# Patient Record
Sex: FEMALE | Race: OTHER | HISPANIC OR LATINO | Employment: UNEMPLOYED | URBAN - METROPOLITAN AREA
[De-identification: names, ages, dates, MRNs, and addresses within clinical notes are randomized per-mention and may not be internally consistent; named-entity substitution may affect disease eponyms.]

---

## 2018-03-15 ENCOUNTER — OFFICE VISIT (OUTPATIENT)
Dept: URGENT CARE | Facility: CLINIC | Age: 48
End: 2018-03-15
Payer: COMMERCIAL

## 2018-03-15 VITALS
DIASTOLIC BLOOD PRESSURE: 93 MMHG | BODY MASS INDEX: 43.43 KG/M2 | OXYGEN SATURATION: 99 % | TEMPERATURE: 100 F | RESPIRATION RATE: 18 BRPM | WEIGHT: 236 LBS | HEIGHT: 62 IN | HEART RATE: 86 BPM | SYSTOLIC BLOOD PRESSURE: 187 MMHG

## 2018-03-15 DIAGNOSIS — J01.90 ACUTE SINUSITIS, RECURRENCE NOT SPECIFIED, UNSPECIFIED LOCATION: Primary | ICD-10-CM

## 2018-03-15 PROCEDURE — 99213 OFFICE O/P EST LOW 20 MIN: CPT | Performed by: PHYSICIAN ASSISTANT

## 2018-03-15 RX ORDER — PRAMIPEXOLE DIHYDROCHLORIDE 1 MG/1
0.25 TABLET ORAL 3 TIMES DAILY
COMMUNITY

## 2018-03-15 RX ORDER — AMOXICILLIN AND CLAVULANATE POTASSIUM 875; 125 MG/1; MG/1
1 TABLET, FILM COATED ORAL EVERY 12 HOURS SCHEDULED
Qty: 20 TABLET | Refills: 0 | Status: SHIPPED | OUTPATIENT
Start: 2018-03-15 | End: 2018-03-25

## 2018-03-15 RX ORDER — LEFLUNOMIDE 20 MG/1
20 TABLET ORAL DAILY
COMMUNITY
End: 2018-09-20

## 2018-03-15 RX ORDER — CLONIDINE HYDROCHLORIDE 0.2 MG/1
0.2 TABLET ORAL
COMMUNITY

## 2018-03-15 RX ORDER — SUMATRIPTAN 100 MG/1
100 TABLET, FILM COATED ORAL ONCE AS NEEDED
COMMUNITY

## 2018-03-15 RX ORDER — MONTELUKAST SODIUM 10 MG/1
10 TABLET ORAL
COMMUNITY

## 2018-03-15 RX ORDER — AMLODIPINE BESYLATE 10 MG/1
10 TABLET ORAL DAILY
COMMUNITY

## 2018-03-15 RX ORDER — HYDROXYZINE HYDROCHLORIDE 25 MG/1
25 TABLET, FILM COATED ORAL EVERY 6 HOURS PRN
COMMUNITY

## 2018-03-15 NOTE — PROGRESS NOTES
[facility]        NAME: Nellie Joe is a 52 y o  female  : 1970    MRN: [id number]  DATE: March 15, 2018  TIME: 4:29 PM    Assessment and Plan   Acute sinusitis, recurrence not specified, unspecified location [J01 90]  1  Acute sinusitis, recurrence not specified, unspecified location  amoxicillin-clavulanate (AUGMENTIN) 875-125 mg per tablet     Patient Instructions     Take antibiotic as prescribed  otc medicines as needed for symptom control  Follow up with PCP in 3-5 days  Proceed to  ER if symptoms worsen  Chief Complaint     Chief Complaint   Patient presents with    Earache     left ear pain frontal headache x 1 week     History of Present Illness       Earache    There is pain in the left ear  This is a new problem  The current episode started today  The problem occurs constantly  The problem has been unchanged  Associated symptoms include coughing and rhinorrhea  Pertinent negatives include no abdominal pain, diarrhea, ear discharge, headaches, hearing loss, neck pain, rash, sore throat or vomiting  She has tried nothing for the symptoms  There is no history of a chronic ear infection, hearing loss or a tympanostomy tube  Review of Systems   Review of Systems   Constitutional: Negative for activity change, appetite change, chills, diaphoresis, fatigue, fever and unexpected weight change  HENT: Positive for ear pain, postnasal drip, rhinorrhea, sinus pain and sinus pressure  Negative for ear discharge, hearing loss and sore throat  Respiratory: Positive for cough  Negative for apnea, choking, chest tightness, shortness of breath, wheezing and stridor  Cardiovascular: Negative for chest pain, palpitations and leg swelling  Gastrointestinal: Negative for abdominal pain, diarrhea and vomiting  Musculoskeletal: Negative for neck pain  Skin: Negative for rash  Neurological: Negative for headaches           Current Medications       Current Outpatient Prescriptions:     amLODIPine (NORVASC) 10 mg tablet, Take 10 mg by mouth daily, Disp: , Rfl:     cloNIDine (CATAPRES) 0 2 mg tablet, Take 0 2 mg by mouth daily at bedtime, Disp: , Rfl:     hydrOXYzine HCL (ATARAX) 25 mg tablet, Take 25 mg by mouth every 6 (six) hours as needed for itching, Disp: , Rfl:     leflunomide (ARAVA) 20 MG tablet, Take 20 mg by mouth daily, Disp: , Rfl:     montelukast (SINGULAIR) 10 mg tablet, Take 10 mg by mouth daily at bedtime, Disp: , Rfl:     pramipexole (MIRAPEX) 1 mg tablet, Take 0 25 mg by mouth 3 (three) times a day, Disp: , Rfl:     SUMAtriptan (IMITREX) 100 mg tablet, Take 100 mg by mouth once as needed for migraine, Disp: , Rfl:     amoxicillin-clavulanate (AUGMENTIN) 875-125 mg per tablet, Take 1 tablet by mouth every 12 (twelve) hours for 10 days, Disp: 20 tablet, Rfl: 0    Current Allergies     Allergies as of 03/15/2018 - never reviewed   Allergen Reaction Noted    Ibuprofen  03/15/2018    Latex  03/15/2018    Lisinopril  03/15/2018    Red dye  03/15/2018            The following portions of the patient's history were reviewed and updated as appropriate: allergies, current medications, past family history, past medical history, past social history, past surgical history and problem list      Past Medical History:   Diagnosis Date    Angioedema     Hypertension     Migraine     Restless leg        History reviewed  No pertinent surgical history  No family history on file  Medications have been verified  Objective   There were no vitals taken for this visit  Physical Exam     Physical Exam   Constitutional: She appears well-developed and well-nourished  HENT:   Head: Normocephalic  Right Ear: Hearing, tympanic membrane, external ear and ear canal normal    Left Ear: Hearing, external ear and ear canal normal  Tympanic membrane is retracted  Nose: Mucosal edema and rhinorrhea (purulent) present   Right sinus exhibits frontal sinus tenderness  Left sinus exhibits frontal sinus tenderness  Mouth/Throat: Posterior oropharyngeal erythema present  No oropharyngeal exudate or posterior oropharyngeal edema  Cardiovascular: Normal rate, regular rhythm, normal heart sounds and intact distal pulses  Exam reveals no gallop and no friction rub  No murmur heard  Pulmonary/Chest: Effort normal and breath sounds normal  No respiratory distress  She has no wheezes  She has no rales  She exhibits no tenderness  Abdominal: Soft  Bowel sounds are normal  She exhibits no distension and no mass  There is no tenderness  There is no rebound and no guarding

## 2018-07-08 ENCOUNTER — OFFICE VISIT (OUTPATIENT)
Dept: URGENT CARE | Facility: CLINIC | Age: 48
End: 2018-07-08
Payer: COMMERCIAL

## 2018-07-08 VITALS
BODY MASS INDEX: 43.43 KG/M2 | RESPIRATION RATE: 18 BRPM | HEART RATE: 75 BPM | HEIGHT: 62 IN | DIASTOLIC BLOOD PRESSURE: 77 MMHG | SYSTOLIC BLOOD PRESSURE: 142 MMHG | TEMPERATURE: 97.6 F | WEIGHT: 236 LBS | OXYGEN SATURATION: 97 %

## 2018-07-08 DIAGNOSIS — R10.11 RIGHT UPPER QUADRANT ABDOMINAL PAIN: Primary | ICD-10-CM

## 2018-07-08 PROCEDURE — 99213 OFFICE O/P EST LOW 20 MIN: CPT | Performed by: EMERGENCY MEDICINE

## 2018-07-08 NOTE — PROGRESS NOTES
[address] [facility]        NAME: Marybeth Bautista is a 52 y o  female  : 1970    MRN: [id number]  DATE: 2018  TIME: 11:35 AM    Assessment and Plan   Right upper quadrant abdominal pain [R10 11]  1  Right upper quadrant abdominal pain  Ambulatory Referral to Emergency Medicine         Patient Instructions     Patient Instructions     Gallstones   WHAT YOU NEED TO KNOW:   Gallstones, also called cholelithiasis, are hard substances that form in your gallbladder or bile duct  Your gallbladder and bile duct are located on the right side of your abdomen, near your liver  Your gallbladder stores bile, which helps break down the fat that you eat  Your gallbladder also helps remove certain chemicals from your body  DISCHARGE INSTRUCTIONS:   Medicines:   · Prescription pain medicine  may be given  Ask your healthcare provider how to take this medicine safely  · Take your medicine as directed  Contact your healthcare provider if you think your medicine is not helping or if you have side effects  Tell him if you are allergic to any medicine  Keep a list of the medicines, vitamins, and herbs you take  Include the amounts, and when and why you take them  Bring the list or the pill bottles to follow-up visits  Carry your medicine list with you in case of an emergency  Follow up with your healthcare provider as directed:  Write down your questions so you remember to ask them during your visits  Eat a variety of healthy foods: This may help you have more energy and heal faster  Healthy foods include fruit, vegetables, whole-grain breads, low-fat dairy products, beans, lean meat, and fish  Ask if you need to be on a special diet  Exercise as directed:  Talk to your healthcare provider about the best exercise plan for you  Exercise can help you lose weight and improve your health  Contact your healthcare provider if:   · You have nausea and are vomiting  · Your urine is dark      · You have gilles-colored bowel movements  · You have questions or concerns about your condition or care  Seek care immediately or call 911 if:   · You have a fever and chills  · Your skin or eyes turn yellow  · You have severe pain in your upper abdomen, just below the right ribcage  © 2017 [employer] Information is for End User's use only and may not be sold, redistributed or otherwise used for commercial purposes  All illustrations and images included in CareNotes® are the copyrighted property of [employer]  or [employer]  The above information is an  only  It is not intended as medical advice for individual conditions or treatments  Talk to your doctor, nurse or pharmacist before following any medical regimen to see if it is safe and effective for you  Follow up with PCP in 3-5 days  Proceed to  ER if symptoms worsen  Chief Complaint     Chief Complaint   Patient presents with    Abdominal Pain     right upper abdominal pain described as stabbing since yesterday worse today         History of Present Illness       Patient complains of sharp right upper quadrant pain with radiation to back onset yesterday after fatty foods  She denies fever chills  Admits similar but less severe symptoms in the past         Review of Systems   Review of Systems   Constitutional: Negative for chills and fever  HENT: Negative for sinus pressure, sore throat, trouble swallowing and voice change  Respiratory: Negative for cough, chest tightness, shortness of breath and wheezing  Cardiovascular: Negative for chest pain  Gastrointestinal: Positive for abdominal pain and nausea  Negative for abdominal distention, blood in stool, diarrhea and vomiting  Skin: Negative for rash  Neurological: Negative for headaches           Current Medications       Current Outpatient Prescriptions:     amLODIPine (NORVASC) 10 mg tablet, Take 10 mg by mouth daily, Disp: , Rfl:    cloNIDine (CATAPRES) 0 2 mg tablet, Take 0 2 mg by mouth daily at bedtime, Disp: , Rfl:     hydrOXYzine HCL (ATARAX) 25 mg tablet, Take 25 mg by mouth every 6 (six) hours as needed for itching, Disp: , Rfl:     leflunomide (ARAVA) 20 MG tablet, Take 20 mg by mouth daily, Disp: , Rfl:     montelukast (SINGULAIR) 10 mg tablet, Take 10 mg by mouth daily at bedtime, Disp: , Rfl:     pramipexole (MIRAPEX) 1 mg tablet, Take 0 25 mg by mouth 3 (three) times a day, Disp: , Rfl:     SUMAtriptan (IMITREX) 100 mg tablet, Take 100 mg by mouth once as needed for migraine, Disp: , Rfl:     Current Allergies     Allergies as of 07/08/2018 - Reviewed 07/08/2018   Allergen Reaction Noted    Ibuprofen  03/15/2018    Latex  03/15/2018    Lisinopril  03/15/2018    Red dye  03/15/2018            The following portions of the patient's history were reviewed and updated as appropriate: allergies, current medications, past family history, past medical history, past social history, past surgical history and problem list      Past Medical History:   Diagnosis Date    Angioedema     Hypertension     Migraine     Restless leg        History reviewed  No pertinent surgical history  No family history on file  Medications have been verified  Objective   /77   Pulse 75   Temp 97 6 °F (36 4 °C) (Tympanic)   Resp 18   Ht 5' 2" (1 575 m)   Wt 107 kg (236 lb)   SpO2 97%   BMI 43 16 kg/m²        Physical Exam     Physical Exam   Constitutional: She is oriented to person, place, and time  She appears well-developed and well-nourished  No distress  HENT:   Head: Normocephalic and atraumatic  Right Ear: Tympanic membrane and external ear normal    Left Ear: Tympanic membrane and external ear normal    Nose: Mucosal edema present  Mouth/Throat: Posterior oropharyngeal erythema present  No oropharyngeal exudate or tonsillar abscesses  Neck: Neck supple  Cardiovascular: Normal rate and regular rhythm  Pulmonary/Chest: Effort normal    Abdominal: Soft  Bowel sounds are normal  She exhibits no distension and no mass  There is tenderness  There is no rebound and no guarding  Tender right upper quadrant no rebound or guarding  Neurological: She is alert and oriented to person, place, and time  Skin: Skin is warm and dry  No rash noted  No pallor  Nursing note and vitals reviewed

## 2018-07-08 NOTE — PATIENT INSTRUCTIONS
Gallstones   WHAT YOU NEED TO KNOW:   Gallstones, also called cholelithiasis, are hard substances that form in your gallbladder or bile duct  Your gallbladder and bile duct are located on the right side of your abdomen, near your liver  Your gallbladder stores bile, which helps break down the fat that you eat  Your gallbladder also helps remove certain chemicals from your body  DISCHARGE INSTRUCTIONS:   Medicines:   · Prescription pain medicine  may be given  Ask your healthcare provider how to take this medicine safely  · Take your medicine as directed  Contact your healthcare provider if you think your medicine is not helping or if you have side effects  Tell him if you are allergic to any medicine  Keep a list of the medicines, vitamins, and herbs you take  Include the amounts, and when and why you take them  Bring the list or the pill bottles to follow-up visits  Carry your medicine list with you in case of an emergency  Follow up with your healthcare provider as directed:  Write down your questions so you remember to ask them during your visits  Eat a variety of healthy foods: This may help you have more energy and heal faster  Healthy foods include fruit, vegetables, whole-grain breads, low-fat dairy products, beans, lean meat, and fish  Ask if you need to be on a special diet  Exercise as directed:  Talk to your healthcare provider about the best exercise plan for you  Exercise can help you lose weight and improve your health  Contact your healthcare provider if:   · You have nausea and are vomiting  · Your urine is dark  · You have gilles-colored bowel movements  · You have questions or concerns about your condition or care  Seek care immediately or call 911 if:   · You have a fever and chills  · Your skin or eyes turn yellow  · You have severe pain in your upper abdomen, just below the right ribcage    © 2017 [employer] Information is for End User's use only and may not be sold, redistributed or otherwise used for commercial purposes  All illustrations and images included in CareNotes® are the copyrighted property of [employer]  or [employer]  The above information is an  only  It is not intended as medical advice for individual conditions or treatments  Talk to your doctor, nurse or pharmacist before following any medical regimen to see if it is safe and effective for you

## 2018-07-14 ENCOUNTER — OFFICE VISIT (OUTPATIENT)
Dept: URGENT CARE | Facility: CLINIC | Age: 48
End: 2018-07-14
Payer: COMMERCIAL

## 2018-07-14 ENCOUNTER — APPOINTMENT (OUTPATIENT)
Dept: RADIOLOGY | Facility: CLINIC | Age: 48
End: 2018-07-14
Payer: COMMERCIAL

## 2018-07-14 VITALS
SYSTOLIC BLOOD PRESSURE: 186 MMHG | RESPIRATION RATE: 20 BRPM | OXYGEN SATURATION: 97 % | DIASTOLIC BLOOD PRESSURE: 85 MMHG | HEART RATE: 77 BPM | HEIGHT: 62 IN | BODY MASS INDEX: 42.33 KG/M2 | WEIGHT: 230 LBS | TEMPERATURE: 99.1 F

## 2018-07-14 DIAGNOSIS — M25.562 LEFT KNEE PAIN, UNSPECIFIED CHRONICITY: ICD-10-CM

## 2018-07-14 DIAGNOSIS — M25.562 LEFT KNEE PAIN, UNSPECIFIED CHRONICITY: Primary | ICD-10-CM

## 2018-07-14 PROCEDURE — 73562 X-RAY EXAM OF KNEE 3: CPT

## 2018-07-14 PROCEDURE — 99213 OFFICE O/P EST LOW 20 MIN: CPT | Performed by: EMERGENCY MEDICINE

## 2018-07-14 PROCEDURE — 20610 DRAIN/INJ JOINT/BURSA W/O US: CPT | Performed by: EMERGENCY MEDICINE

## 2018-07-14 RX ORDER — METHYLPREDNISOLONE ACETATE 40 MG/ML
1 INJECTION, SUSPENSION INTRA-ARTICULAR; INTRALESIONAL; INTRAMUSCULAR; SOFT TISSUE
Status: COMPLETED | OUTPATIENT
Start: 2018-07-14 | End: 2018-07-14

## 2018-07-14 RX ORDER — LIDOCAINE HYDROCHLORIDE 10 MG/ML
2 INJECTION, SOLUTION INFILTRATION; PERINEURAL
Status: COMPLETED | OUTPATIENT
Start: 2018-07-14 | End: 2018-07-14

## 2018-07-14 RX ADMIN — LIDOCAINE HYDROCHLORIDE 2 ML: 10 INJECTION, SOLUTION INFILTRATION; PERINEURAL at 16:36

## 2018-07-14 RX ADMIN — METHYLPREDNISOLONE ACETATE 1 ML: 40 INJECTION, SUSPENSION INTRA-ARTICULAR; INTRALESIONAL; INTRAMUSCULAR; SOFT TISSUE at 16:36

## 2018-07-14 NOTE — PROGRESS NOTES
[facility] [employer] [facility]        NAME: Swetha Harper is a 52 y o  female  : 1970    MRN: [id number]  DATE: 2018  TIME: 4:37 PM    Assessment and Plan   Left knee pain, unspecified chronicity [M25 562]  1  Left knee pain, unspecified chronicity  XR knee 3 vw left non injury    Ambulatory referral to Physical Therapy     Large joint arthrocentesis  Date/Time: 2018 4:36 PM  Consent given by: patient  Supporting Documentation  Indications: pain and joint swelling   Procedure Details  Location: knee - L knee  Preparation: Patient was prepped and draped in the usual sterile fashion  Needle size: 25 G  Ultrasound guidance: no  Approach: anteromedial  Medications administered: 2 mL lidocaine 1 %; 1 mL methylPREDNISolone acetate 40 mg/mL    Patient tolerance: patient tolerated the procedure well with no immediate complications  Dressing:  Sterile dressing applied        Patient Instructions     Patient Instructions   Arthralgia   WHAT YOU NEED TO KNOW:   Arthralgia is pain in one or more joints, with no inflammation  It may be short-term and get better within 6 to 8 weeks  Arthralgia can be an early sign of arthritis  Arthralgia may be caused by a medical condition, such as a hormone disorder or a tumor  It may also be caused by an infection or injury  DISCHARGE INSTRUCTIONS:   Medicines: The following medicines may  be ordered for you:  · Acetaminophen  decreases pain  Ask how much to take and how often to take it  Follow directions  Acetaminophen can cause liver damage if not taken correctly  · NSAIDs  decrease pain and prevent swelling  Ask your healthcare provider which medicine is right for you  Ask how much to take and when to take it  Take as directed  NSAIDs can cause stomach bleeding and kidney problems if not taken correctly  · Pain relief cream  decreases pain  Use this cream as directed  · Take your medicine as directed    Contact your healthcare provider if you think your medicine is not helping or if you have side effects  Tell him of her if you are allergic to any medicine  Keep a list of the medicines, vitamins, and herbs you take  Include the amounts, and when and why you take them  Bring the list or the pill bottles to follow-up visits  Carry your medicine list with you in case of an emergency  Follow up with your healthcare provider or specialist as directed:  Write down your questions so you remember to ask them during your visits  Self-care:   · Apply heat  to help decrease pain  Use a heating pad or heat wrap  Apply heat for 20 to 30 minutes every 2 hours for as many days as directed  · Rest  as much as possible  Avoid activities that cause joint pain  · Apply ice  to help decrease swelling and pain  Ice may also help prevent tissue damage  Use an ice pack, or put crushed ice in a plastic bag  Cover it with a towel and place it on your painful joint for 15 to 20 minutes every hour or as directed  · Support  the joint with a brace or elastic wrap as directed  · Elevate  your joint above the level of your heart as often as you can to help decrease swelling and pain  Prop your painful joint on pillows or blankets to keep it elevated comfortably  · Lose weight  if you are overweight  Extra weight can put pressure on your joints and cause more pain  Ask your healthcare provider how much you should weigh  Ask him to help you create a weight loss plan  · Exercise  regularly to help improve joint movement and to decrease pain  Ask about the best exercise plan for you  Low-impact exercises can help take the pressure off your joints  Examples are walking, swimming, and water aerobics  Physical therapy:  A physical therapist teaches you exercises to help improve movement and strength, and to decrease pain  Ask your healthcare provider if physical therapy is right for you  Contact your healthcare provider or specialist if:   · You have a fever       · You continue to have joint pain that cannot be relieved with heat, ice, or medicine  · You have pain and inflammation around your joint  · You have questions or concerns about your condition or care  Return to the emergency department if:   · You have sudden, severe pain when you move your joint  · You have a fever and shaking chills  · You cannot move your joint  · You lose feeling on the side of your body where you have the painful joint  © 2017 [place] [facility] Information is for End User's use only and may not be sold, redistributed or otherwise used for commercial purposes  All illustrations and images included in CareNotes® are the copyrighted property of [employer]  or [employer]  The above information is an  only  It is not intended as medical advice for individual conditions or treatments  Talk to your doctor, nurse or pharmacist before following any medical regimen to see if it is safe and effective for you  Knee Pain   WHAT YOU NEED TO KNOW:   Knee pain may start suddenly, or it may be a long-term problem  You may have pain on the side, front, or back of your knee  You may have knee stiffness and swelling  You may hear popping sounds or feel like your knee is giving way or locking up as you walk  You may feel pain when you sit, stand, walk, or climb up and down stairs  Knee pain can be caused by conditions such as obesity, inflammation, or strains or tears in ligaments or tendons  DISCHARGE INSTRUCTIONS:   Follow up with your healthcare provider within 24 hours or as directed: You may need follow-up treatments, such as steroid injections to decrease pain  Write down your questions so you remember to ask them during your visits  Self-care:   · Rest  your knee so it can heal  Limit activities that increase your pain  · Ice  can help reduce swelling  Wrap ice in a towel and put it on your knee for as long and as often as directed      · Compression  with a brace or bandage can help reduce swelling  Use a brace or bandage only as directed  · Elevation  helps decrease pain and swelling  Elevate your knee while you are sitting or lying down  Prop your leg on pillows to keep your knee above the level of your heart  Medicines:   · NSAIDs  help decrease swelling and pain or fever  This medicine is available with or without a doctor's order  NSAIDs can cause stomach bleeding or kidney problems in certain people  If you take blood thinner medicine, always ask your healthcare provider if NSAIDs are safe for you  Always read the medicine label and follow directions  · Acetaminophen  decreases pain and fever  It is available without a doctor's order  Ask how much to take and when to take it  Follow directions  Acetaminophen can cause liver damage if not taken correctly  · Take your medicine as directed  Contact your healthcare provider if you think your medicine is not helping or if you have side effects  Tell him or her if you are allergic to any medicine  Keep a list of the medicines, vitamins, and herbs you take  Include the amounts, and when and why you take them  Bring the list or the pill bottles to follow-up visits  Carry your medicine list with you in case of an emergency  Exercise as directed: You may need to see a physical therapist or do recommended exercises to improve movement and decrease your pain  You may be directed to walk, swim, or ride a bike  Follow your exercise plan exactly as directed to avoid further injury  Contact your healthcare provider if:   · You have questions or concerns about your condition or care  Return to the emergency department if:   · Your pain is worse, even after treatment  · You cannot bend or straighten your leg completely  · The swelling around your knee does not go down even with treatment  · Your knee is painful and hot to the touch    © 2017 [employer] Information is for End User's use only and may not be sold, redistributed or otherwise used for commercial purposes  All illustrations and images included in CareNotes® are the copyrighted property of [employer]  or [employer]  The above information is an  only  It is not intended as medical advice for individual conditions or treatments  Talk to your doctor, nurse or pharmacist before following any medical regimen to see if it is safe and effective for you  Follow up with PCP in 3-5 days  Proceed to  ER if symptoms worsen  Chief Complaint     Chief Complaint   Patient presents with    Knee Pain     Left knee pain  No injury  Patient states this is a chronic problem but that it seems to be worse the past few days  History of Present Illness       Patient complains of recurring pain left knee for many years  She recently started an exercise program and complains of the knee pain now is worse  She does not recall ever having had an x-ray of her knee but just assumed that she has arthritis  Review of Systems   Review of Systems   Constitutional: Negative for activity change  Gastrointestinal: Negative for abdominal pain  Musculoskeletal: Positive for arthralgias and joint swelling  Negative for back pain, myalgias, neck pain and neck stiffness  Skin: Negative for color change and wound  Neurological: Negative for dizziness, syncope, weakness, light-headedness and headaches           Current Medications       Current Outpatient Prescriptions:     amLODIPine (NORVASC) 10 mg tablet, Take 10 mg by mouth daily, Disp: , Rfl:     cloNIDine (CATAPRES) 0 2 mg tablet, Take 0 2 mg by mouth daily at bedtime, Disp: , Rfl:     hydrOXYzine HCL (ATARAX) 25 mg tablet, Take 25 mg by mouth every 6 (six) hours as needed for itching, Disp: , Rfl:     leflunomide (ARAVA) 20 MG tablet, Take 20 mg by mouth daily, Disp: , Rfl:     montelukast (SINGULAIR) 10 mg tablet, Take 10 mg by mouth daily at bedtime, Disp: , Rfl:     pramipexole (MIRAPEX) 1 mg tablet, Take 0 25 mg by mouth 3 (three) times a day, Disp: , Rfl:     SUMAtriptan (IMITREX) 100 mg tablet, Take 100 mg by mouth once as needed for migraine, Disp: , Rfl:     Current Allergies     Allergies as of 07/14/2018 - Reviewed 07/14/2018   Allergen Reaction Noted    Ibuprofen  03/15/2018    Latex  03/15/2018    Lisinopril  03/15/2018    Red dye  03/15/2018            The following portions of the patient's history were reviewed and updated as appropriate: allergies, current medications, past family history, past medical history, past social history, past surgical history and problem list      Past Medical History:   Diagnosis Date    Angioedema     Hypertension     Migraine     Restless leg        History reviewed  No pertinent surgical history  History reviewed  No pertinent family history  Medications have been verified  Objective   BP (!) 186/85   Pulse 77   Temp 99 1 °F (37 3 °C) (Tympanic)   Resp 20   Ht 5' 2" (1 575 m)   Wt 104 kg (230 lb)   SpO2 97%   BMI 42 07 kg/m²        Physical Exam     Physical Exam   Constitutional: She is oriented to person, place, and time  She appears well-developed and well-nourished  HENT:   Head: Normocephalic and atraumatic  Eyes: Conjunctivae and EOM are normal  Pupils are equal, round, and reactive to light  Neck: Normal range of motion  Neck supple  Cardiovascular: Normal rate and regular rhythm  Pulmonary/Chest: Effort normal and breath sounds normal    Musculoskeletal: She exhibits tenderness  Mild effusion left knee mild tenderness medial collateral ligament  Neurological: She is alert and oriented to person, place, and time  Skin: Skin is warm and dry  No rash noted  Nursing note and vitals reviewed

## 2018-07-14 NOTE — PATIENT INSTRUCTIONS
Arthralgia   WHAT YOU NEED TO KNOW:   Arthralgia is pain in one or more joints, with no inflammation  It may be short-term and get better within 6 to 8 weeks  Arthralgia can be an early sign of arthritis  Arthralgia may be caused by a medical condition, such as a hormone disorder or a tumor  It may also be caused by an infection or injury  DISCHARGE INSTRUCTIONS:   Medicines: The following medicines may  be ordered for you:  · Acetaminophen  decreases pain  Ask how much to take and how often to take it  Follow directions  Acetaminophen can cause liver damage if not taken correctly  · NSAIDs  decrease pain and prevent swelling  Ask your healthcare provider which medicine is right for you  Ask how much to take and when to take it  Take as directed  NSAIDs can cause stomach bleeding and kidney problems if not taken correctly  · Pain relief cream  decreases pain  Use this cream as directed  · Take your medicine as directed  Contact your healthcare provider if you think your medicine is not helping or if you have side effects  Tell him of her if you are allergic to any medicine  Keep a list of the medicines, vitamins, and herbs you take  Include the amounts, and when and why you take them  Bring the list or the pill bottles to follow-up visits  Carry your medicine list with you in case of an emergency  Follow up with your healthcare provider or specialist as directed:  Write down your questions so you remember to ask them during your visits  Self-care:   · Apply heat  to help decrease pain  Use a heating pad or heat wrap  Apply heat for 20 to 30 minutes every 2 hours for as many days as directed  · Rest  as much as possible  Avoid activities that cause joint pain  · Apply ice  to help decrease swelling and pain  Ice may also help prevent tissue damage  Use an ice pack, or put crushed ice in a plastic bag   Cover it with a towel and place it on your painful joint for 15 to 20 minutes every hour or as directed  · Support  the joint with a brace or elastic wrap as directed  · Elevate  your joint above the level of your heart as often as you can to help decrease swelling and pain  Prop your painful joint on pillows or blankets to keep it elevated comfortably  · Lose weight  if you are overweight  Extra weight can put pressure on your joints and cause more pain  Ask your healthcare provider how much you should weigh  Ask him to help you create a weight loss plan  · Exercise  regularly to help improve joint movement and to decrease pain  Ask about the best exercise plan for you  Low-impact exercises can help take the pressure off your joints  Examples are walking, swimming, and water aerobics  Physical therapy:  A physical therapist teaches you exercises to help improve movement and strength, and to decrease pain  Ask your healthcare provider if physical therapy is right for you  Contact your healthcare provider or specialist if:   · You have a fever  · You continue to have joint pain that cannot be relieved with heat, ice, or medicine  · You have pain and inflammation around your joint  · You have questions or concerns about your condition or care  Return to the emergency department if:   · You have sudden, severe pain when you move your joint  · You have a fever and shaking chills  · You cannot move your joint  · You lose feeling on the side of your body where you have the painful joint  © 2017 [address]  Information is for End User's use only and may not be sold, redistributed or otherwise used for commercial purposes  All illustrations and images included in CareNotes® are the copyrighted property of [employer]  or [employer]  The above information is an  only  It is not intended as medical advice for individual conditions or treatments   Talk to your doctor, nurse or pharmacist before following any medical regimen to see if it is safe and effective for you  Knee Pain   WHAT YOU NEED TO KNOW:   Knee pain may start suddenly, or it may be a long-term problem  You may have pain on the side, front, or back of your knee  You may have knee stiffness and swelling  You may hear popping sounds or feel like your knee is giving way or locking up as you walk  You may feel pain when you sit, stand, walk, or climb up and down stairs  Knee pain can be caused by conditions such as obesity, inflammation, or strains or tears in ligaments or tendons  DISCHARGE INSTRUCTIONS:   Follow up with your healthcare provider within 24 hours or as directed: You may need follow-up treatments, such as steroid injections to decrease pain  Write down your questions so you remember to ask them during your visits  Self-care:   · Rest  your knee so it can heal  Limit activities that increase your pain  · Ice  can help reduce swelling  Wrap ice in a towel and put it on your knee for as long and as often as directed  · Compression  with a brace or bandage can help reduce swelling  Use a brace or bandage only as directed  · Elevation  helps decrease pain and swelling  Elevate your knee while you are sitting or lying down  Prop your leg on pillows to keep your knee above the level of your heart  Medicines:   · NSAIDs  help decrease swelling and pain or fever  This medicine is available with or without a doctor's order  NSAIDs can cause stomach bleeding or kidney problems in certain people  If you take blood thinner medicine, always ask your healthcare provider if NSAIDs are safe for you  Always read the medicine label and follow directions  · Acetaminophen  decreases pain and fever  It is available without a doctor's order  Ask how much to take and when to take it  Follow directions  Acetaminophen can cause liver damage if not taken correctly  · Take your medicine as directed    Contact your healthcare provider if you think your medicine is not helping or if you have side effects  Tell him or her if you are allergic to any medicine  Keep a list of the medicines, vitamins, and herbs you take  Include the amounts, and when and why you take them  Bring the list or the pill bottles to follow-up visits  Carry your medicine list with you in case of an emergency  Exercise as directed: You may need to see a physical therapist or do recommended exercises to improve movement and decrease your pain  You may be directed to walk, swim, or ride a bike  Follow your exercise plan exactly as directed to avoid further injury  Contact your healthcare provider if:   · You have questions or concerns about your condition or care  Return to the emergency department if:   · Your pain is worse, even after treatment  · You cannot bend or straighten your leg completely  · The swelling around your knee does not go down even with treatment  · Your knee is painful and hot to the touch  © 2017 [address] Information is for End User's use only and may not be sold, redistributed or otherwise used for commercial purposes  All illustrations and images included in CareNotes® are the copyrighted property of [employer]  or [employer]  The above information is an  only  It is not intended as medical advice for individual conditions or treatments  Talk to your doctor, nurse or pharmacist before following any medical regimen to see if it is safe and effective for you

## 2018-08-03 ENCOUNTER — OFFICE VISIT (OUTPATIENT)
Dept: URGENT CARE | Facility: CLINIC | Age: 48
End: 2018-08-03
Payer: COMMERCIAL

## 2018-08-03 ENCOUNTER — TRANSCRIBE ORDERS (OUTPATIENT)
Dept: ADMINISTRATIVE | Facility: HOSPITAL | Age: 48
End: 2018-08-03

## 2018-08-03 ENCOUNTER — APPOINTMENT (OUTPATIENT)
Dept: LAB | Facility: CLINIC | Age: 48
End: 2018-08-03
Payer: COMMERCIAL

## 2018-08-03 VITALS
TEMPERATURE: 98.3 F | OXYGEN SATURATION: 97 % | HEART RATE: 70 BPM | HEIGHT: 63 IN | DIASTOLIC BLOOD PRESSURE: 76 MMHG | BODY MASS INDEX: 40.75 KG/M2 | RESPIRATION RATE: 20 BRPM | SYSTOLIC BLOOD PRESSURE: 132 MMHG | WEIGHT: 230 LBS

## 2018-08-03 DIAGNOSIS — G43.009 MIGRAINE WITHOUT AURA AND WITHOUT STATUS MIGRAINOSUS, NOT INTRACTABLE: ICD-10-CM

## 2018-08-03 DIAGNOSIS — E03.9 ADULT HYPOTHYROIDISM: Primary | ICD-10-CM

## 2018-08-03 DIAGNOSIS — E03.9 ACQUIRED HYPOTHYROIDISM: ICD-10-CM

## 2018-08-03 DIAGNOSIS — E03.9 ADULT HYPOTHYROIDISM: ICD-10-CM

## 2018-08-03 DIAGNOSIS — H65.01 RIGHT ACUTE SEROUS OTITIS MEDIA, RECURRENCE NOT SPECIFIED: Primary | ICD-10-CM

## 2018-08-03 DIAGNOSIS — R10.11 RIGHT UPPER QUADRANT ABDOMINAL PAIN: ICD-10-CM

## 2018-08-03 LAB — TSH SERPL DL<=0.05 MIU/L-ACNC: 1.17 UIU/ML (ref 0.36–3.74)

## 2018-08-03 PROCEDURE — 99203 OFFICE O/P NEW LOW 30 MIN: CPT | Performed by: EMERGENCY MEDICINE

## 2018-08-03 PROCEDURE — 36415 COLL VENOUS BLD VENIPUNCTURE: CPT

## 2018-08-03 PROCEDURE — 84443 ASSAY THYROID STIM HORMONE: CPT

## 2018-08-03 RX ORDER — PREDNISONE 10 MG/1
TABLET ORAL
Qty: 27 TABLET | Refills: 0 | Status: SHIPPED | OUTPATIENT
Start: 2018-08-03 | End: 2018-08-16 | Stop reason: ALTCHOICE

## 2018-08-03 NOTE — PROGRESS NOTES
[facility]        NAME: Liset Iqbal is a 50 y o  female  : 1970    MRN: [id number]  DATE: August 3, 2018  TIME: 10:04 AM    Assessment and Plan   Right acute serous otitis media, recurrence not specified [H65 01]  1  Right acute serous otitis media, recurrence not specified  predniSONE 10 mg tablet   2  Migraine without aura and without status migrainosus, not intractable     3  Acquired hypothyroidism     4  Right upper quadrant abdominal pain       I offered to send her to the ER for further evaluation but she does not want to go to the ER at this time  Patient Instructions     Patient Instructions     Otitis Media, Ambulatory Care   GENERAL INFORMATION:   Otitis media  is an ear infection  Common symptoms include the following:   · Fever or a headache    · Ear pain    · Trouble hearing    · Ear feels plugged or full or you have ringing or buzzing in your ear    · Dizziness or you lose your balance    · Nausea or vomiting  Seek immediate care for the following symptoms:   · Seizure    · Fever and a stiff neck  Treatment for otitis media  may include any of the following:  · NSAIDs  help decrease swelling and pain or fever  This medicine is available with or without a doctor's order  NSAIDs can cause stomach bleeding or kidney problems in certain people  If you take blood thinner medicine, always ask your healthcare provider if NSAIDs are safe for you  Always read the medicine label and follow directions  · Ear drops  to help treat your ear pain  · Antibiotics  to help kill the germs that caused your ear infection  Care for otitis media:   · Use heat  Place a warm, moist washcloth on your ear to decrease pain  Apply for 15 to 20 minutes, 3 to 4 times a day    · Use ice  Ice helps decrease swelling and pain  Use an ice pack or put crushed ice in a plastic bag  Cover the ice pack with a towel and place it on your ear for 15 to 20 minutes, 3 to 4 times a day for 2 days    Prevent otitis media:   · Wash your hands often  This will help prevent the spread of germs  Encourage everyone in your house to wash their hands with soap and water after they use the bathroom  Everyone should also wash their hands after they change a child's diaper and before they prepare or eat food  · Stay away from people who are ill  Germs are easily and quickly spread through contact  Follow up with your healthcare provider as directed:  Write down your questions so you remember to ask them during your visits  CARE AGREEMENT:   You have the right to help plan your care  Learn about your health condition and how it may be treated  Discuss treatment options with your caregivers to decide what care you want to receive  You always have the right to refuse treatment  The above information is an  only  It is not intended as medical advice for individual conditions or treatments  Talk to your doctor, nurse or pharmacist before following any medical regimen to see if it is safe and effective for you  © 2014 [address] is for End User's use only and may not be sold, redistributed or otherwise used for commercial purposes  All illustrations and images included in CareNotes® are the copyrighted property of [employer]  or [employer]  Hypothyroidism   AMBULATORY CARE:   Hypothyroidism  is a condition that develops when the thyroid gland does not make enough thyroid hormone  Thyroid hormones help control body temperature, heart rate, growth, and weight  Common signs and symptoms include the following: The signs and symptoms may develop slowly, sometimes over several years    · Exhaustion    · Sensitivity to cold    · Headaches or decreased concentration    · Muscle aches or weakness    · Constipation     · Dry, flaky skin or brittle nails    · Thinning hair    · Heavy or irregular monthly periods    · Depression or irritability  Call 911 for any of the following:   · You have sudden chest pain or shortness of breath  · You have a seizure  · You feel like you are going to faint  Seek care immediately if:   · You have diarrhea, tremors, or trouble sleeping  · Your legs, ankles, or feet are swollen  Contact your healthcare provider if:   · You have a fever  · You have chills, a cough, or feel weak and achy  · You have pain and swelling in your muscles and joints  · Your skin is itchy, swollen, or you have a rash  · Your signs and symptoms return or get worse, even after treatment  · You have questions or concerns about your condition or care  Treatment:  Thyroid hormone replacement medicine may bring your thyroid hormone level back to normal  Ask your healthcare provider for more information on other medicines you may need  Follow up with your healthcare provider as directed: You may need to return for more blood tests to check your thyroid hormone level  This will show if you are getting the right amount of thyroid medicine  Write down your questions so you remember to ask them during your visits  © 2017 [address] Information is for End User's use only and may not be sold, redistributed or otherwise used for commercial purposes  All illustrations and images included in CareNotes® are the copyrighted property of [employer]  or [employer]  The above information is an  only  It is not intended as medical advice for individual conditions or treatments  Talk to your doctor, nurse or pharmacist before following any medical regimen to see if it is safe and effective for you  Acute Headache   WHAT YOU NEED TO KNOW:   An acute headache is pain or discomfort that starts suddenly and gets worse quickly  You may have an acute headache only when you feel stress or eat certain foods  Other acute headache pain can happen every day, and sometimes several times a day     DISCHARGE INSTRUCTIONS:   Return to the emergency department if:   · You have severe pain  · You have numbness or weakness on one side of your face or body  · You have a headache that occurs after a blow to the head, a fall, or other trauma  · You have a headache, are forgetful or confused, or have trouble speaking  · You have a headache, stiff neck, and a fever  Contact your healthcare provider if:   · You have a constant headache and are vomiting  · You have a headache each day that does not get better, even after treatment  · You have changes in your headaches, or new symptoms that occur when you have a headache  · You have questions or concerns about your condition or care  Medicines: You may need any of the following:  · Prescription pain medicine  may be given  The medicine your healthcare provider recommends will depend on the kind of headaches you have  You will need to take prescription headache medicines as directed to prevent a problem called rebound headache  These headaches happen with regular use of pain relievers for headache disorders  · NSAIDs , such as ibuprofen, help decrease swelling, pain, and fever  This medicine is available with or without a doctor's order  NSAIDs can cause stomach bleeding or kidney problems in certain people  If you take blood thinner medicine, always ask your healthcare provider if NSAIDs are safe for you  Always read the medicine label and follow directions  · Acetaminophen  decreases pain and fever  It is available without a doctor's order  Ask how much to take and how often to take it  Follow directions  Read the labels of all other medicines you are using to see if they also contain acetaminophen, or ask your doctor or pharmacist  Acetaminophen can cause liver damage if not taken correctly  Do not use more than 3 grams (3,000 milligrams) total of acetaminophen in one day  · Antidepressants  may be given for some kinds of headaches       · Take your medicine as directed  Contact your healthcare provider if you think your medicine is not helping or if you have side effects  Tell him or her if you are allergic to any medicine  Keep a list of the medicines, vitamins, and herbs you take  Include the amounts, and when and why you take them  Bring the list or the pill bottles to follow-up visits  Carry your medicine list with you in case of an emergency  Manage your symptoms:   · Apply heat or ice  on the headache area  Use a heat or ice pack  For an ice pack, you can also put crushed ice in a plastic bag  Cover the pack or bag with a towel before you apply it to your skin  Ice and heat both help decrease pain, and heat also helps decrease muscle spasms  Apply heat for 20 to 30 minutes every 2 hours  Apply ice for 15 to 20 minutes every hour  Apply heat or ice for as long and for as many days as directed  You may alternate heat and ice  · Relax your muscles  Lie down in a comfortable position and close your eyes  Relax your muscles slowly  Start at your toes and work your way up your body  · Keep a record of your headaches  Write down when your headaches start and stop  Include your symptoms and what you were doing when the headache began  Record what you ate or drank for 24 hours before the headache started  Describe the pain and where it hurts  Keep track of what you did to treat your headache and if it worked  Prevent an acute headache:   · Avoid anything that triggers an acute headache  Examples include exposure to chemicals, going to high altitude, or not getting enough sleep  Create a regular sleep routine  Go to sleep at the same time and wake up at the same time each day  Do not use electronic devices before bedtime  These may trigger a headache or prevent you from sleeping well  · Do not smoke  Nicotine and other chemicals in cigarettes and cigars can trigger an acute headache or make it worse   Ask your healthcare provider for information if you currently smoke and need help to quit  E-cigarettes or smokeless tobacco still contain nicotine  Talk to your healthcare provider before you use these products  · Limit alcohol as directed  Alcohol can trigger an acute headache or make it worse  If you have cluster headaches, do not drink alcohol during an episode  For other types of headaches, ask your healthcare provider if it is safe for you to drink alcohol  Ask how much is safe for you to drink, and how often  · Exercise as directed  Exercise can reduce tension and help with headache pain  Aim for 30 minutes of physical activity on most days of the week  Your healthcare provider can help you create an exercise plan  · Eat a variety of healthy foods  Healthy foods include fruits, vegetables, low-fat dairy products, lean meats, fish, whole grains, and cooked beans  Your healthcare provider or dietitian can help you create meals plans if you need to avoid foods that trigger headaches  Follow up with your healthcare provider as directed:  Bring your headache record with you when you see your healthcare provider  Write down your questions so you remember to ask them during your visits  © 2017 [address] [facility] Information is for End User's use only and may not be sold, redistributed or otherwise used for commercial purposes  All illustrations and images included in CareNotes® are the copyrighted property of [employer] Inc  or [employer]  The above information is an  only  It is not intended as medical advice for individual conditions or treatments  Talk to your doctor, nurse or pharmacist before following any medical regimen to see if it is safe and effective for you  Biliary Colic   WHAT YOU NEED TO KNOW:   Biliary colic is severe pain in your upper abdomen caused by a gallbladder problem  Your gallbladder stores bile, which helps break down the fats that you eat          DISCHARGE INSTRUCTIONS:   Medicines: · Medicines  can help decrease pain and muscle spasms  You may also need medicine to calm your stomach and stop vomiting  · Take your medicine as directed  Contact your healthcare provider if you think your medicine is not helping or you have side effects  Tell him if you are allergic to any medicine  Keep a list of the medicines, vitamins, and herbs you take  Include the amounts, and when and why you take them  Bring the list or the pill bottles to follow-up visits  Carry your medicine list with you in case of an emergency  Avoid alcohol:  Alcohol can damage your gallbladder and make your symptoms worse  Maintain a healthy weight:  Ask your healthcare provider how much you should weigh  Ask him to help you create a weight loss plan if you are overweight  Eat a variety of healthy foods:  Healthy foods include fruits, vegetables, whole-grain breads, low-fat dairy products, beans, lean meats, and fish  Foods that are high in fiber and low in fat and cholesterol may decrease your symptoms  Ask if you need to be on a special diet  Exercise:  Ask your healthcare provider about the best exercise plan for you  Exercise may help improve your symptoms  Follow up with your healthcare provider as directed:  Bring a list of any questions you have so you remember to ask them during your visits  Contact your healthcare provider if:   · You have a fever  · Your pain gets worse, even after you take medicine  · You have nausea or are vomiting  · Your skin or eyes are yellow  · You have questions or concerns about your condition or care  Return to the emergency department if:   · You have severe pain  · You feel like you are going to faint  · You are short of breath  © 2017 [address] Information is for End User's use only and may not be sold, redistributed or otherwise used for commercial purposes   All illustrations and images included in CareNotes® are the copyrighted property of [employer] , [employer]  or [employer]  The above information is an  only  It is not intended as medical advice for individual conditions or treatments  Talk to your doctor, nurse or pharmacist before following any medical regimen to see if it is safe and effective for you  Follow up with PCP in 3-5 days  Proceed to  ER if symptoms worsen  Chief Complaint     Chief Complaint   Patient presents with    Earache     Right earache  Frontal headache with light sensitivity  Onset 3 days ago  Symptoms worsening  History of Present Illness       Patient complains of throbbing right frontal headache for the past 3 days with photophobia  Long history of migraines, no relief with her prescribed tryptan  She also complains of fullness and pressure in her right ear the past several days associated with throat and nasal congestion  Long history of seasonal allergies worse at this time of year  She also complains of heat intolerance, fatigue, weight gain, facial puffiness and bloating for the past year  She also complains of recurring right upper quadrant pain with nausea and vomiting especially after fatty foods  She was seen in the ER recently for right upper quadrant pain claims she had a CT scan which showed diverticulitis  Review of Systems   Review of Systems   Constitutional: Negative for activity change, chills, fatigue and fever  HENT: Positive for congestion, ear pain, facial swelling, postnasal drip and sinus pressure  Negative for sore throat  Eyes: Positive for photophobia  Respiratory: Negative for shortness of breath  Gastrointestinal: Positive for nausea  Negative for abdominal pain, blood in stool and vomiting  Endocrine: Negative for cold intolerance, heat intolerance, polydipsia, polyphagia and polyuria  Genitourinary: Negative for hematuria     Musculoskeletal: Negative for arthralgias, back pain, joint swelling, myalgias, neck pain and neck stiffness  Skin: Negative for color change, rash and wound  Neurological: Positive for light-headedness and headaches  Negative for dizziness, seizures, syncope, facial asymmetry, speech difficulty, weakness and numbness  Current Medications       Current Outpatient Prescriptions:     amLODIPine (NORVASC) 10 mg tablet, Take 10 mg by mouth daily, Disp: , Rfl:     cloNIDine (CATAPRES) 0 2 mg tablet, Take 0 2 mg by mouth daily at bedtime, Disp: , Rfl:     hydrOXYzine HCL (ATARAX) 25 mg tablet, Take 25 mg by mouth every 6 (six) hours as needed for itching, Disp: , Rfl:     leflunomide (ARAVA) 20 MG tablet, Take 20 mg by mouth daily, Disp: , Rfl:     montelukast (SINGULAIR) 10 mg tablet, Take 10 mg by mouth daily at bedtime, Disp: , Rfl:     pramipexole (MIRAPEX) 1 mg tablet, Take 0 25 mg by mouth 3 (three) times a day, Disp: , Rfl:     SUMAtriptan (IMITREX) 100 mg tablet, Take 100 mg by mouth once as needed for migraine, Disp: , Rfl:     predniSONE 10 mg tablet, Take once daily all days pills on this schedule 6- 6- 5- 4- 3- 2- 1, Disp: 27 tablet, Rfl: 0    Current Allergies     Allergies as of 08/03/2018 - Reviewed 08/03/2018   Allergen Reaction Noted    Ibuprofen  03/15/2018    Latex  03/15/2018    Lisinopril  03/15/2018    Red dye  03/15/2018            The following portions of the patient's history were reviewed and updated as appropriate: allergies, current medications, past family history, past medical history, past social history, past surgical history and problem list      Past Medical History:   Diagnosis Date    Angioedema     Diverticulitis     Hypertension     Migraine     Restless leg        Past Surgical History:   Procedure Laterality Date    HYSTERECTOMY      TONSILLECTOMY         History reviewed  No pertinent family history  Medications have been verified          Objective   /76   Pulse 70   Temp 98 3 °F (36 8 °C) (Tympanic)   Resp 20   Ht 5' 3" (1 6 m) Wt 104 kg (230 lb)   SpO2 97%   BMI 40 74 kg/m²        Physical Exam     Physical Exam   Constitutional: She appears well-developed and well-nourished  No distress  HENT:   Clear effusion behind right TM no evidence of infection  Oropharynx and nasal mucosa both with mild erythema   Eyes: EOM are normal  Pupils are equal, round, and reactive to light  Right eye exhibits no discharge  Left eye exhibits no discharge  No scleral icterus  Neck: Neck supple  No thyromegaly present  Cardiovascular: Normal rate and regular rhythm  Pulmonary/Chest: Effort normal and breath sounds normal    Abdominal: Soft  Bowel sounds are normal  She exhibits no distension  Mild tenderness right upper quadrant no rebound or guarding   Skin: No rash noted  Psychiatric: She has a normal mood and affect  Her behavior is normal  Judgment and thought content normal    Nursing note and vitals reviewed

## 2018-08-03 NOTE — PATIENT INSTRUCTIONS
Otitis Media, Ambulatory Care   GENERAL INFORMATION:   Otitis media  is an ear infection  Common symptoms include the following:   · Fever or a headache    · Ear pain    · Trouble hearing    · Ear feels plugged or full or you have ringing or buzzing in your ear    · Dizziness or you lose your balance    · Nausea or vomiting  Seek immediate care for the following symptoms:   · Seizure    · Fever and a stiff neck  Treatment for otitis media  may include any of the following:  · NSAIDs  help decrease swelling and pain or fever  This medicine is available with or without a doctor's order  NSAIDs can cause stomach bleeding or kidney problems in certain people  If you take blood thinner medicine, always ask your healthcare provider if NSAIDs are safe for you  Always read the medicine label and follow directions  · Ear drops  to help treat your ear pain  · Antibiotics  to help kill the germs that caused your ear infection  Care for otitis media:   · Use heat  Place a warm, moist washcloth on your ear to decrease pain  Apply for 15 to 20 minutes, 3 to 4 times a day    · Use ice  Ice helps decrease swelling and pain  Use an ice pack or put crushed ice in a plastic bag  Cover the ice pack with a towel and place it on your ear for 15 to 20 minutes, 3 to 4 times a day for 2 days  Prevent otitis media:   · Wash your hands often  This will help prevent the spread of germs  Encourage everyone in your house to wash their hands with soap and water after they use the bathroom  Everyone should also wash their hands after they change a child's diaper and before they prepare or eat food  · Stay away from people who are ill  Germs are easily and quickly spread through contact  Follow up with your healthcare provider as directed:  Write down your questions so you remember to ask them during your visits  CARE AGREEMENT:   You have the right to help plan your care   Learn about your health condition and how it may be treated  Discuss treatment options with your caregivers to decide what care you want to receive  You always have the right to refuse treatment  The above information is an  only  It is not intended as medical advice for individual conditions or treatments  Talk to your doctor, nurse or pharmacist before following any medical regimen to see if it is safe and effective for you  © 2014 [address] is for End User's use only and may not be sold, redistributed or otherwise used for commercial purposes  All illustrations and images included in CareNotes® are the copyrighted property of [employer]  or [employer]  Hypothyroidism   AMBULATORY CARE:   Hypothyroidism  is a condition that develops when the thyroid gland does not make enough thyroid hormone  Thyroid hormones help control body temperature, heart rate, growth, and weight  Common signs and symptoms include the following: The signs and symptoms may develop slowly, sometimes over several years  · Exhaustion    · Sensitivity to cold    · Headaches or decreased concentration    · Muscle aches or weakness    · Constipation     · Dry, flaky skin or brittle nails    · Thinning hair    · Heavy or irregular monthly periods    · Depression or irritability  Call 911 for any of the following:   · You have sudden chest pain or shortness of breath  · You have a seizure  · You feel like you are going to faint  Seek care immediately if:   · You have diarrhea, tremors, or trouble sleeping  · Your legs, ankles, or feet are swollen  Contact your healthcare provider if:   · You have a fever  · You have chills, a cough, or feel weak and achy  · You have pain and swelling in your muscles and joints  · Your skin is itchy, swollen, or you have a rash  · Your signs and symptoms return or get worse, even after treatment  · You have questions or concerns about your condition or care    Treatment: Thyroid hormone replacement medicine may bring your thyroid hormone level back to normal  Ask your healthcare provider for more information on other medicines you may need  Follow up with your healthcare provider as directed: You may need to return for more blood tests to check your thyroid hormone level  This will show if you are getting the right amount of thyroid medicine  Write down your questions so you remember to ask them during your visits  © 2017 [address] Information is for End User's use only and may not be sold, redistributed or otherwise used for commercial purposes  All illustrations and images included in CareNotes® are the copyrighted property of [employer]  or [employer]  The above information is an  only  It is not intended as medical advice for individual conditions or treatments  Talk to your doctor, nurse or pharmacist before following any medical regimen to see if it is safe and effective for you  Acute Headache   WHAT YOU NEED TO KNOW:   An acute headache is pain or discomfort that starts suddenly and gets worse quickly  You may have an acute headache only when you feel stress or eat certain foods  Other acute headache pain can happen every day, and sometimes several times a day  DISCHARGE INSTRUCTIONS:   Return to the emergency department if:   · You have severe pain  · You have numbness or weakness on one side of your face or body  · You have a headache that occurs after a blow to the head, a fall, or other trauma  · You have a headache, are forgetful or confused, or have trouble speaking  · You have a headache, stiff neck, and a fever  Contact your healthcare provider if:   · You have a constant headache and are vomiting  · You have a headache each day that does not get better, even after treatment  · You have changes in your headaches, or new symptoms that occur when you have a headache      · You have questions or concerns about your condition or care  Medicines: You may need any of the following:  · Prescription pain medicine  may be given  The medicine your healthcare provider recommends will depend on the kind of headaches you have  You will need to take prescription headache medicines as directed to prevent a problem called rebound headache  These headaches happen with regular use of pain relievers for headache disorders  · NSAIDs , such as ibuprofen, help decrease swelling, pain, and fever  This medicine is available with or without a doctor's order  NSAIDs can cause stomach bleeding or kidney problems in certain people  If you take blood thinner medicine, always ask your healthcare provider if NSAIDs are safe for you  Always read the medicine label and follow directions  · Acetaminophen  decreases pain and fever  It is available without a doctor's order  Ask how much to take and how often to take it  Follow directions  Read the labels of all other medicines you are using to see if they also contain acetaminophen, or ask your doctor or pharmacist  Acetaminophen can cause liver damage if not taken correctly  Do not use more than 3 grams (3,000 milligrams) total of acetaminophen in one day  · Antidepressants  may be given for some kinds of headaches  · Take your medicine as directed  Contact your healthcare provider if you think your medicine is not helping or if you have side effects  Tell him or her if you are allergic to any medicine  Keep a list of the medicines, vitamins, and herbs you take  Include the amounts, and when and why you take them  Bring the list or the pill bottles to follow-up visits  Carry your medicine list with you in case of an emergency  Manage your symptoms:   · Apply heat or ice  on the headache area  Use a heat or ice pack  For an ice pack, you can also put crushed ice in a plastic bag  Cover the pack or bag with a towel before you apply it to your skin   Ice and heat both help decrease pain, and heat also helps decrease muscle spasms  Apply heat for 20 to 30 minutes every 2 hours  Apply ice for 15 to 20 minutes every hour  Apply heat or ice for as long and for as many days as directed  You may alternate heat and ice  · Relax your muscles  Lie down in a comfortable position and close your eyes  Relax your muscles slowly  Start at your toes and work your way up your body  · Keep a record of your headaches  Write down when your headaches start and stop  Include your symptoms and what you were doing when the headache began  Record what you ate or drank for 24 hours before the headache started  Describe the pain and where it hurts  Keep track of what you did to treat your headache and if it worked  Prevent an acute headache:   · Avoid anything that triggers an acute headache  Examples include exposure to chemicals, going to high altitude, or not getting enough sleep  Create a regular sleep routine  Go to sleep at the same time and wake up at the same time each day  Do not use electronic devices before bedtime  These may trigger a headache or prevent you from sleeping well  · Do not smoke  Nicotine and other chemicals in cigarettes and cigars can trigger an acute headache or make it worse  Ask your healthcare provider for information if you currently smoke and need help to quit  E-cigarettes or smokeless tobacco still contain nicotine  Talk to your healthcare provider before you use these products  · Limit alcohol as directed  Alcohol can trigger an acute headache or make it worse  If you have cluster headaches, do not drink alcohol during an episode  For other types of headaches, ask your healthcare provider if it is safe for you to drink alcohol  Ask how much is safe for you to drink, and how often  · Exercise as directed  Exercise can reduce tension and help with headache pain  Aim for 30 minutes of physical activity on most days of the week   Your healthcare provider can help you create an exercise plan  · Eat a variety of healthy foods  Healthy foods include fruits, vegetables, low-fat dairy products, lean meats, fish, whole grains, and cooked beans  Your healthcare provider or dietitian can help you create meals plans if you need to avoid foods that trigger headaches  Follow up with your healthcare provider as directed:  Bring your headache record with you when you see your healthcare provider  Write down your questions so you remember to ask them during your visits  © 2017 [address]  Information is for End User's use only and may not be sold, redistributed or otherwise used for commercial purposes  All illustrations and images included in CareNotes® are the copyrighted property of [employer]  or [employer]  The above information is an  only  It is not intended as medical advice for individual conditions or treatments  Talk to your doctor, nurse or pharmacist before following any medical regimen to see if it is safe and effective for you  Biliary Colic   WHAT YOU NEED TO KNOW:   Biliary colic is severe pain in your upper abdomen caused by a gallbladder problem  Your gallbladder stores bile, which helps break down the fats that you eat  DISCHARGE INSTRUCTIONS:   Medicines:   · Medicines  can help decrease pain and muscle spasms  You may also need medicine to calm your stomach and stop vomiting  · Take your medicine as directed  Contact your healthcare provider if you think your medicine is not helping or you have side effects  Tell him if you are allergic to any medicine  Keep a list of the medicines, vitamins, and herbs you take  Include the amounts, and when and why you take them  Bring the list or the pill bottles to follow-up visits  Carry your medicine list with you in case of an emergency  Avoid alcohol:  Alcohol can damage your gallbladder and make your symptoms worse    Maintain a healthy weight:  Ask your healthcare provider how much you should weigh  Ask him to help you create a weight loss plan if you are overweight  Eat a variety of healthy foods:  Healthy foods include fruits, vegetables, whole-grain breads, low-fat dairy products, beans, lean meats, and fish  Foods that are high in fiber and low in fat and cholesterol may decrease your symptoms  Ask if you need to be on a special diet  Exercise:  Ask your healthcare provider about the best exercise plan for you  Exercise may help improve your symptoms  Follow up with your healthcare provider as directed:  Bring a list of any questions you have so you remember to ask them during your visits  Contact your healthcare provider if:   · You have a fever  · Your pain gets worse, even after you take medicine  · You have nausea or are vomiting  · Your skin or eyes are yellow  · You have questions or concerns about your condition or care  Return to the emergency department if:   · You have severe pain  · You feel like you are going to faint  · You are short of breath  © 2017 [address]  Information is for End User's use only and may not be sold, redistributed or otherwise used for commercial purposes  All illustrations and images included in CareNotes® are the copyrighted property of [employer]  or [employer]  The above information is an  only  It is not intended as medical advice for individual conditions or treatments  Talk to your doctor, nurse or pharmacist before following any medical regimen to see if it is safe and effective for you

## 2018-08-08 ENCOUNTER — TELEPHONE (OUTPATIENT)
Dept: URGENT CARE | Facility: CLINIC | Age: 48
End: 2018-08-08

## 2018-08-13 ENCOUNTER — TELEPHONE (OUTPATIENT)
Dept: UROLOGY | Facility: MEDICAL CENTER | Age: 48
End: 2018-08-13

## 2018-08-13 NOTE — TELEPHONE ENCOUNTER
Preferred Location- [facility]  History of Cancer? No  Previous Urologist- No  Outside Testing?   [facility], can access via [employer]? No    KT

## 2018-08-14 ENCOUNTER — EVALUATION (OUTPATIENT)
Dept: PHYSICAL THERAPY | Facility: CLINIC | Age: 48
End: 2018-08-14
Payer: COMMERCIAL

## 2018-08-14 DIAGNOSIS — M25.562 LEFT KNEE PAIN, UNSPECIFIED CHRONICITY: ICD-10-CM

## 2018-08-14 PROCEDURE — 97162 PT EVAL MOD COMPLEX 30 MIN: CPT | Performed by: PHYSICAL THERAPIST

## 2018-08-14 PROCEDURE — G8978 MOBILITY CURRENT STATUS: HCPCS | Performed by: PHYSICAL THERAPIST

## 2018-08-14 PROCEDURE — G8979 MOBILITY GOAL STATUS: HCPCS | Performed by: PHYSICAL THERAPIST

## 2018-08-14 NOTE — PROGRESS NOTES
PT Evaluation     Today's date: 2018  Patient name: Sebastian Mcnamara  : 1970  MRN: [id number]  Referring provider: Funmi London MD  Dx:   Encounter Diagnosis     ICD-10-CM    1  Left knee pain, unspecified chronicity M25 562 Ambulatory referral to Physical Therapy                  Assessment  Impairments: abnormal gait, abnormal or restricted ROM, activity intolerance, impaired balance, impaired physical strength, lacks appropriate home exercise program, pain with function, weight-bearing intolerance and poor body mechanics    Assessment details: Sebastian Mcnamara is a pleasant 50 y o  y/o female who presents with complaints of B knee pain over the past several moths  The patients greatest concerns are  worry over not knowing what's wrong, wanting to avoid surgery and fear of not being able to keep active  No further referral appears necessary at this time based upon examination results  Primary movement impairment diagnosis of knee joint hypomobility and weakness resulting in pathoanatomical symptoms of Left knee pain and limiting her ability to exercise or recreation, go to work, lift, perform household chores, perform yard work, socialize with friends, squat to  objects from the floor, stand and walk  Etiologic factors include repetitive poor body mechanics, poor ergonomics, poor lower extremity strength, poor hip mobility and poor balance      Goals    Short Term Goals:  - Decrease pain by 50% in 3 weeks  - Increase ROM by 50% in 4 weeks  - Increase strength by 50% in 4 weeks    Long Term Goals:  - Independent with comprehensive home exercise program at discharge  - Increase Functional Status Measure to: 50 (FOTO)  - Increase strength equal to contralateral side at discharge  - Increase ROM to [facility] at discharge    Plan  Patient would benefit from: skilled physical therapy  Planned modality interventions: cryotherapy, unattended electrical stimulation and thermotherapy: hydrocollator packs  Planned therapy interventions: abdominal trunk stabilization, activity modification, balance, balance/weight bearing training, body mechanics training, home exercise program, stretching, strengthening, patient education, neuromuscular re-education, manual therapy and joint mobilization  Frequency: 3x week  Duration in weeks: 4  Treatment plan discussed with: patient        Subjective Evaluation    History of Present Illness  Mechanism of injury: Pt  Presents to PT with cc of chronic B knee pain (L worse than R ) She notes originally hurting back approx 2 years ago and B knee pain soon developed  When pain became severe 1 month ago, she visited Dr Ayala Mcmahon in [place] [address] [facility]  She received injection and was referred to PT  She notes pain resolved in L knee following injection and did not attend appt but pain came back 2 weeks later  She now presents with cc of swelling and severe pain keeping her from walking, squatting, and standing  Pain  Current pain ratin  At best pain ratin  At worst pain rating: 10    Social Support  Steps to enter house: yes  Lives with: spouse      Diagnostic Tests  X-ray: abnormal  Patient Goals  Patient goals for therapy: decreased edema, decreased pain, improved balance, increased motion, increased strength, return to sport/leisure activities and independence with ADLs/IADLs          Objective     Observations   Left Knee   Positive for edema  Right Knee   Positive for edema  Tenderness   Left Knee   Tenderness in the medial joint line  Right Knee   Tenderness in the medial joint line       Neurological Testing     Sensation     Knee   Left Knee   Intact: light touch    Right Knee   Intact: light touch     Reflexes   Left   Patellar (L4): normal (2+)    Right   Patellar (L4): normal (2+)    Active Range of Motion   Left Knee   Flexion: 89 degrees with pain  Extension: -1 degrees     Right Knee   Flexion: 106 degrees   Extension: -1 degrees     Patellar Static Positioning   Left Knee: lateral tilt and erin  Right Knee: lateral tilt and erin    Strength/Myotome Testing     Left Knee   Flexion: 4-  Extension: 4-  Quadriceps contraction: fair    Right Knee   Flexion: 4+  Extension: 4  Quadriceps contraction: good    Swelling     Left Knee Girth Measurement (cm)   Joint line: 43 cm    Right Knee Girth Measurement (cm)   Joint line: 43 cm          Precautions: None    Daily Treatment Diary     Manual                                                                                   Exercise Diary                                                                                                                                                                                                                                                                                      Modalities

## 2018-08-15 RX ORDER — GABAPENTIN 600 MG/1
TABLET ORAL
Refills: 0 | COMMUNITY
Start: 2018-05-18

## 2018-08-15 RX ORDER — TOPIRAMATE 50 MG/1
TABLET, FILM COATED ORAL
Refills: 5 | COMMUNITY
Start: 2018-08-06 | End: 2018-09-20

## 2018-08-16 ENCOUNTER — OFFICE VISIT (OUTPATIENT)
Dept: PHYSICAL THERAPY | Facility: CLINIC | Age: 48
End: 2018-08-16
Payer: COMMERCIAL

## 2018-08-16 ENCOUNTER — OFFICE VISIT (OUTPATIENT)
Dept: UROLOGY | Facility: CLINIC | Age: 48
End: 2018-08-16
Payer: COMMERCIAL

## 2018-08-16 VITALS
SYSTOLIC BLOOD PRESSURE: 116 MMHG | DIASTOLIC BLOOD PRESSURE: 78 MMHG | BODY MASS INDEX: 42.33 KG/M2 | WEIGHT: 230 LBS | HEIGHT: 62 IN

## 2018-08-16 DIAGNOSIS — M25.562 LEFT KNEE PAIN, UNSPECIFIED CHRONICITY: Primary | ICD-10-CM

## 2018-08-16 DIAGNOSIS — N32.81 OVERACTIVE BLADDER: Primary | ICD-10-CM

## 2018-08-16 DIAGNOSIS — R35.0 URINARY FREQUENCY: Primary | ICD-10-CM

## 2018-08-16 DIAGNOSIS — R35.1 NOCTURIA: ICD-10-CM

## 2018-08-16 DIAGNOSIS — N39.41 URGE INCONTINENCE: ICD-10-CM

## 2018-08-16 LAB — POST-VOID RESIDUAL VOLUME, ML POC: 35 ML

## 2018-08-16 PROCEDURE — 97110 THERAPEUTIC EXERCISES: CPT

## 2018-08-16 PROCEDURE — 99204 OFFICE O/P NEW MOD 45 MIN: CPT | Performed by: UROLOGY

## 2018-08-16 PROCEDURE — 97140 MANUAL THERAPY 1/> REGIONS: CPT

## 2018-08-16 PROCEDURE — 51798 US URINE CAPACITY MEASURE: CPT | Performed by: UROLOGY

## 2018-08-16 RX ORDER — FESOTERODINE FUMARATE 4 MG/1
4 TABLET, EXTENDED RELEASE ORAL
Qty: 30 TABLET | Refills: 1 | OUTPATIENT
Start: 2018-08-16

## 2018-08-16 RX ORDER — SOLIFENACIN SUCCINATE 5 MG/1
5 TABLET, FILM COATED ORAL DAILY
Qty: 30 TABLET | Refills: 11 | Status: SHIPPED | OUTPATIENT
Start: 2018-08-16 | End: 2018-08-16 | Stop reason: ALTCHOICE

## 2018-08-16 RX ORDER — OXYBUTYNIN CHLORIDE 5 MG/1
5 TABLET, EXTENDED RELEASE ORAL
Qty: 30 TABLET | Refills: 11 | Status: SHIPPED | OUTPATIENT
Start: 2018-08-16 | End: 2018-08-16 | Stop reason: ALTCHOICE

## 2018-08-16 NOTE — PROGRESS NOTES
[address] [facility]  [facility]  [address] [facility], [address] [place] [address]  [phone]  www  [facility]  org      NAME: Dominique Vasquez  AGE: 50 y o  SEX: female  : 1970   MRN: [id number]    DATE: 2018  TIME: 10:41 AM    Assessment and Plan:  Nocturia and urge incontinence: We will start with VESIcare 5 mg at bedtime  Cannot take oxybutynin due to red dye allergy  Rectocele:  Does not necessarily need to be repaired, but if she wishes to have this addressed she will need see urogynecology  Pelvic pressure/pain:  Likely due to rectocele  Follow-up in 3 months  Chief Complaint     Chief Complaint   Patient presents with    Urinary Frequency       History of Present Illness   New patient office visit for urinary frequency  43-year-old woman with a history of chronic migraines, hypertension restless leg syndrome angioedema and past history of diverticulitis  Urinalysis performed today is basically negative  It is described as cloudy  She has had dysfunctional voiding symptoms since age 15 when she went to [address] [place] [address] and she had hold her urine for long periods of time  She had a hysterectomy 4 years ago-since then, especially the past year she has been experiencing urge incontinence and nocturia 4-5 times  During the day she is doing okay but she forces herself not to go  Most of her accidents are at night, but she does have some episodes during the day especially in the past week and she has had to wear pads  No stress incontinence  Bladder scan shows a PVR of 39 cc  The following portions of the patient's history were reviewed and updated as appropriate: allergies, current medications, past family history, past medical history, past social history, past surgical history and problem list     Review of Systems   Review of Systems   Gastrointestinal: Negative      Genitourinary: Positive for frequency, pelvic pain and urgency  Negative for decreased urine volume, difficulty urinating, dyspareunia, dysuria, enuresis, flank pain, genital sores, hematuria, menstrual problem, vaginal bleeding, vaginal discharge and vaginal pain  She has cramps like menses even without having a uterus, and feels suprapubic pressure and pain  Active Problem List   There is no problem list on file for this patient  Objective   /78   Ht 5' 2" (1 575 m)   Wt 104 kg (230 lb)   BMI 42 07 kg/m²     Physical Exam   Constitutional: She is oriented to person, place, and time  She appears well-developed and well-nourished  HENT:   Head: Normocephalic and atraumatic  Eyes: EOM are normal    Neck: Normal range of motion  Pulmonary/Chest: Effort normal    Abdominal: Soft  She exhibits no distension and no mass  There is no tenderness  There is no rebound and no guarding  Genitourinary: Vagina normal    Genitourinary Comments: Uterus is surgically absent  Normal urethra  Normal introitus  No cystocele  She has a grade 2 to grade 3 rectocele  Musculoskeletal: Normal range of motion  Neurological: She is alert and oriented to person, place, and time  Skin: Skin is warm and dry  Psychiatric: She has a normal mood and affect   Her behavior is normal  Judgment and thought content normal            Current Medications     Current Outpatient Prescriptions:     amLODIPine (NORVASC) 10 mg tablet, Take 10 mg by mouth daily, Disp: , Rfl:     cloNIDine (CATAPRES) 0 2 mg tablet, Take 0 2 mg by mouth daily at bedtime, Disp: , Rfl:     gabapentin (NEURONTIN) 600 MG tablet, TAKE 1 Tablet BY MOUTH TWICE DAILY FOR PAIN AND SLEEP, Disp: , Rfl: 0    hydrOXYzine HCL (ATARAX) 25 mg tablet, Take 25 mg by mouth every 6 (six) hours as needed for itching, Disp: , Rfl:     montelukast (SINGULAIR) 10 mg tablet, Take 10 mg by mouth daily at bedtime, Disp: , Rfl:     pramipexole (MIRAPEX) 1 mg tablet, Take 0 25 mg by mouth 3 (three) times a day, Disp: , Rfl:     SUMAtriptan (IMITREX) 100 mg tablet, Take 100 mg by mouth once as needed for migraine, Disp: , Rfl:     topiramate (TOPAMAX) 50 MG tablet, TAKE 1 TABLET BY MOUTH EVERY DAY AT NIGHT, Disp: , Rfl: 5    leflunomide (ARAVA) 20 MG tablet, Take 20 mg by mouth daily, Disp: , Rfl:         Clemon Canavan, MD

## 2018-08-16 NOTE — TELEPHONE ENCOUNTER
Patient returned my call and the issued explained to her  She is clear on the dosing instructions and verbalized understanding

## 2018-08-16 NOTE — PROGRESS NOTES
Daily Note     Today's date: 2018  Patient name: Greg Woods  : 1970  MRN: [id number]  Referring provider: Rosas Carter MD  Dx:   Encounter Diagnosis     ICD-10-CM    1  Left knee pain, unspecified chronicity M25 562        Start Time: 1130  Stop Time: 1230  Total time in clinic (min): 60 minutes    Subjective: Patient reports she woke up with 7/10 pain today  Objective: See treatment diary below      Assessment: Tolerated treatment well  Patient reports decreased pain post treatment 5/10  She will benefit from continued skilled PT intervention  Plan: Continue per plan of care       Precautions: None    Daily Treatment Diary     Manual              Patellar mobs 4 mn            Quad stretches prone :30 x 6                                                       Exercise Diary              [facility] 10'            Heel slides 10' x2            LAQ 10x            SAQ :10 x 10            SLR supine flex 10 x            SLR side 10x            Hamstring stretches :30 x 3            Standing SLR adb/ext 10 x 2 ea            Mini squatts 10 x            Treadmill nv                                                                                                                                                  Modalities

## 2018-08-16 NOTE — TELEPHONE ENCOUNTER
Received a Response Request from [facility]/pharmacy #[id number] Vesicare 5mg was prescribed today at her visit in the [facility] location  Patient was NOT offered Oxybutynin IR or ER as she has a RED FOOD DYE ALLERGY and this in contraindicated  Her plan suggested Tolterodine ER 2mg or Toviaz ER 4mg as possible alternatives  Dr Radha Echols consulted and he suggested Toviaz ER 4m 1 PO QHS as his drug of choice  Verbal order for new medication was called into [facility]/pharmacy #[id number]; spoke with a female named DREA Reyes Ph   I left messages on both a home phone and cell phone voice mails to explain above

## 2018-08-16 NOTE — LETTER
2018     Kendra Gordon MD  [address] [address]    Patient: Mitzy Jones   YOB: 1970   Date of Visit: 2018       Dear Dr Marti Witt:    Thank you for referring Mitzy Jones to me for evaluation  Below are my notes for this consultation  If you have questions, please do not hesitate to call me  I look forward to following your patient along with you  Sincerely,        Beverly Groves MD        CC: No Recipients  Beverly Groves MD  2018 11:09 AM  Sign at close encounter  [address] [facility]  [address], [address] [facility]  [phone]  www  [facility]  org      NAME: Mitzy Jones  AGE: 50 y o  SEX: female  : 1970   MRN: [id number]    DATE: 2018  TIME: 10:41 AM    Assessment and Plan:  Nocturia and urge incontinence: We will start with VESIcare 5 mg at bedtime  Cannot take oxybutynin due to red dye allergy  Rectocele:  Does not necessarily need to be repaired, but if she wishes to have this addressed she will need see urogynecology  Pelvic pressure/pain:  Likely due to rectocele  Follow-up in 3 months  Chief Complaint     Chief Complaint   Patient presents with    Urinary Frequency       History of Present Illness   New patient office visit for urinary frequency  60-year-old woman with a history of chronic migraines, hypertension restless leg syndrome angioedema and past history of diverticulitis  Urinalysis performed today is basically negative  It is described as cloudy  She has had dysfunctional voiding symptoms since age 15 when she went to [address] [place] [address] and she had hold her urine for long periods of time  She had a hysterectomy 4 years ago-since then, especially the past year she has been experiencing urge incontinence and nocturia 4-5 times  During the day she is doing okay but she forces herself not to go    Most of her accidents are at night, but she does have some episodes during the day especially in the past week and she has had to wear pads  No stress incontinence  Bladder scan shows a PVR of 39 cc  The following portions of the patient's history were reviewed and updated as appropriate: allergies, current medications, past family history, past medical history, past social history, past surgical history and problem list     Review of Systems   Review of Systems   Gastrointestinal: Negative  Genitourinary: Positive for frequency, pelvic pain and urgency  Negative for decreased urine volume, difficulty urinating, dyspareunia, dysuria, enuresis, flank pain, genital sores, hematuria, menstrual problem, vaginal bleeding, vaginal discharge and vaginal pain  She has cramps like menses even without having a uterus, and feels suprapubic pressure and pain  Active Problem List   There is no problem list on file for this patient  Objective   /78   Ht 5' 2" (1 575 m)   Wt 104 kg (230 lb)   BMI 42 07 kg/m²      Physical Exam   Constitutional: She is oriented to person, place, and time  She appears well-developed and well-nourished  HENT:   Head: Normocephalic and atraumatic  Eyes: EOM are normal    Neck: Normal range of motion  Pulmonary/Chest: Effort normal    Abdominal: Soft  She exhibits no distension and no mass  There is no tenderness  There is no rebound and no guarding  Genitourinary: Vagina normal    Genitourinary Comments: Uterus is surgically absent  Normal urethra  Normal introitus  No cystocele  She has a grade 2 to grade 3 rectocele  Musculoskeletal: Normal range of motion  Neurological: She is alert and oriented to person, place, and time  Skin: Skin is warm and dry  Psychiatric: She has a normal mood and affect   Her behavior is normal  Judgment and thought content normal            Current Medications     Current Outpatient Prescriptions:     amLODIPine (NORVASC) 10 mg tablet, Take 10 mg by mouth daily, Disp: , Rfl:     cloNIDine (CATAPRES) 0 2 mg tablet, Take 0 2 mg by mouth daily at bedtime, Disp: , Rfl:     gabapentin (NEURONTIN) 600 MG tablet, TAKE 1 Tablet BY MOUTH TWICE DAILY FOR PAIN AND SLEEP, Disp: , Rfl: 0    hydrOXYzine HCL (ATARAX) 25 mg tablet, Take 25 mg by mouth every 6 (six) hours as needed for itching, Disp: , Rfl:     montelukast (SINGULAIR) 10 mg tablet, Take 10 mg by mouth daily at bedtime, Disp: , Rfl:     pramipexole (MIRAPEX) 1 mg tablet, Take 0 25 mg by mouth 3 (three) times a day, Disp: , Rfl:     SUMAtriptan (IMITREX) 100 mg tablet, Take 100 mg by mouth once as needed for migraine, Disp: , Rfl:     topiramate (TOPAMAX) 50 MG tablet, TAKE 1 TABLET BY MOUTH EVERY DAY AT NIGHT, Disp: , Rfl: 5    leflunomide (ARAVA) 20 MG tablet, Take 20 mg by mouth daily, Disp: , Rfl:         Sandie Hammer MD

## 2018-08-17 ENCOUNTER — OFFICE VISIT (OUTPATIENT)
Dept: PHYSICAL THERAPY | Facility: CLINIC | Age: 48
End: 2018-08-17
Payer: COMMERCIAL

## 2018-08-17 DIAGNOSIS — M25.562 LEFT KNEE PAIN, UNSPECIFIED CHRONICITY: Primary | ICD-10-CM

## 2018-08-17 PROCEDURE — 97112 NEUROMUSCULAR REEDUCATION: CPT

## 2018-08-17 PROCEDURE — 97110 THERAPEUTIC EXERCISES: CPT

## 2018-08-17 PROCEDURE — 97140 MANUAL THERAPY 1/> REGIONS: CPT

## 2018-08-17 NOTE — PROGRESS NOTES
Daily Note     Today's date: 2018  Patient name: José Antonio Mena  : 1970  MRN: [id number]  Referring provider: Robert Marinelli MD  Dx:   Encounter Diagnosis     ICD-10-CM    1  Left knee pain, unspecified chronicity M25 562                   Subjective: Patient reports she is sore today since she was just here yesterday  Reports 7/10 knee pain today again  Objective: See treatment diary below      Assessment: Patient noted feeling significantly less pain and more loose after session today  Remains weak in b/l LE  Able to perform better mini squats with ball on the wall as she did not have crepitus when performing this way  Cont  To progress strengthening  Plan: Continue per plan of care       Precautions: None     Daily Treatment Diary      Manual                     Patellar mobs 4 mn  4 min                   Quad stretches prone :30 x 6  :30x5                                                                                                 Exercise Diary                     [address] [facility] 10'  10'                   Heel slides 10' x2                     LAQ 10x  10x                   SAQ :10 x 10  :10x10                   SLR supine flex 10 x  15x ea                   SLR side 10x                     Hamstring stretches :30 x 3  :30 x4                   Standing SLR adb/ext 10 x 2 ea  1# 2x10                   Mini squatts 10 x  on ball 2x10                   Treadmill nv                                                                                                                                                                                                                                                                           Modalities

## 2018-08-20 ENCOUNTER — OFFICE VISIT (OUTPATIENT)
Dept: PHYSICAL THERAPY | Facility: CLINIC | Age: 48
End: 2018-08-20
Payer: COMMERCIAL

## 2018-08-20 DIAGNOSIS — M25.562 LEFT KNEE PAIN, UNSPECIFIED CHRONICITY: Primary | ICD-10-CM

## 2018-08-20 PROCEDURE — 97110 THERAPEUTIC EXERCISES: CPT | Performed by: PHYSICAL THERAPIST

## 2018-08-20 PROCEDURE — 97140 MANUAL THERAPY 1/> REGIONS: CPT | Performed by: PHYSICAL THERAPIST

## 2018-08-20 NOTE — PROGRESS NOTES
Daily Note     Today's date: 2018  Patient name: Liset Iqbal  : 1970  MRN: [id number]  Referring provider: Libia Rios MD  Dx:   Encounter Diagnosis     ICD-10-CM    1  Left knee pain, unspecified chronicity M25 562                   Subjective: Pt  Reports 5/10 knee pain  States she is a little more aware of mechanics that increase pain  Objective: See treatment diary below      Assessment: Tolerated treatment well  Patient demonstrated fatigue post treatment, exhibited good technique with therapeutic exercises and would benefit from continued PT      Plan: Continue per plan of care  Progress treatment as tolerated          Precautions: None     Daily Treatment Diary      Manual                   Patellar mobs 4 mn  4 min  5 min                 Quad stretches prone :30 x 6  :30x5  4x30"                                                                                               Exercise Diary                   [facility] 10'  10'  10'                 Heel slides 10' x2    10x2"                 LAQ 10x  10x  30x with slump stretch                 SAQ :10 x 10  :10x10                   SLR supine flex 10 x  15x ea  30x                 SLR side 10x    10x                 Hamstring stretches :30 x 3  :30 x4  4x30"                 Standing SLR adb/ext 10 x 2 ea  1# 2x10  3x10"                 Mini squatts 10 x  on ball 2x10  On ball, 3x10                 Treadmill nv                      Heel Rise with TKE - - 30x                                                                                                                                                                                                                                               Modalities

## 2018-08-22 ENCOUNTER — OFFICE VISIT (OUTPATIENT)
Dept: PHYSICAL THERAPY | Facility: CLINIC | Age: 48
End: 2018-08-22
Payer: COMMERCIAL

## 2018-08-22 DIAGNOSIS — M25.562 LEFT KNEE PAIN, UNSPECIFIED CHRONICITY: Primary | ICD-10-CM

## 2018-08-22 PROCEDURE — 97110 THERAPEUTIC EXERCISES: CPT | Performed by: PHYSICAL THERAPIST

## 2018-08-22 NOTE — PROGRESS NOTES
Daily Note     Today's date: 2018  Patient name: Yessi Peterson  : 1970  MRN: [id number]  Referring provider: Zainab Mckee MD  Dx:   Encounter Diagnosis     ICD-10-CM    1  Left knee pain, unspecified chronicity M25 562                   Subjective: Pt  Notes only minor pain in L knee  She is pleased with ongoing progress  Objective: See treatment diary below      Assessment: Tolerated treatment well  Patient demonstrated fatigue post treatment, exhibited good technique with therapeutic exercises and would benefit from continued PT      Plan: Continue per plan of care  Progress treatment as tolerated        Precautions: None     Daily Treatment Diary      Manual                 Patellar mobs 4 mn  4 min  5 min  -               Quad stretches prone :30 x 6  :30x5  4x30"  -                                                                                             Exercise Diary                 [facility] 10'  10'  10'  10'               Heel slides 10' x2    10x2"  10x2"               LAQ 10x  10x  30x with slump stretch  30x with slump stretch               SAQ :10 x 10  :10x10  -  10x               SLR supine flex 10 x  15x ea  30x  30x               SLR side 10x    10x  20x               Hamstring stretches :30 x 3  :30 x4  4x30"  4x30"               Standing SLR adb/ext 10 x 2 ea  1# 2x10  3x10"  -               Mini squatts 10 x  on ball 2x10  On ball, 3x10  -               Treadmill nv      2 5 mph, 10 mins @ 4%                Heel Rise with TKE - - 30x  on AE 30x                                                                                                                                                                                                                                             Modalities

## 2018-08-24 ENCOUNTER — OFFICE VISIT (OUTPATIENT)
Dept: PHYSICAL THERAPY | Facility: CLINIC | Age: 48
End: 2018-08-24
Payer: COMMERCIAL

## 2018-08-24 DIAGNOSIS — M25.562 LEFT KNEE PAIN, UNSPECIFIED CHRONICITY: Primary | ICD-10-CM

## 2018-08-24 PROCEDURE — 97140 MANUAL THERAPY 1/> REGIONS: CPT | Performed by: PHYSICAL THERAPIST

## 2018-08-24 PROCEDURE — 97110 THERAPEUTIC EXERCISES: CPT | Performed by: PHYSICAL THERAPIST

## 2018-08-24 NOTE — PROGRESS NOTES
Daily Note     Today's date: 2018  Patient name: Tosha Harper  : 1970  MRN: [id number]  Referring provider: Dacia Dodge MD  Dx:   Encounter Diagnosis     ICD-10-CM    1  Left knee pain, unspecified chronicity M25 562                   Subjective: Pt  Denies knee pain today  She states calves got sore following last session  Objective: See treatment diary below      Assessment: Tosha Harper was progressed with PT interventions, focusing on appropriate technique and intensity  Pt  Required mod cuing from therapist to complete  Pt  Would benefit from continued physical therapy to promote improved activity tolerance and function  Plan: Continue per plan of care  Progress treatment as tolerated        Precautions: None     Daily Treatment Diary      Manual               Patellar mobs 4 mn  4 min  5 min  -  10 min             Quad stretches prone :30 x 6  :30x5  4x30"  -  -                                                                                           Exercise Diary               [facility] 10'  10'  10'  10'  5'             Heel slides 10' x2    10x2"  10x2"  30x             LAQ 10x  10x  30x with slump stretch  30x with slump stretch  30x             SAQ :10 x 10  :10x10  -  10x  30x             SLR supine flex 10 x  15x ea  30x  30x  30x             SLR side 10x    10x  20x  30x             Hamstring stretches :30 x 3  :30 x4  4x30"  4x30"  4x30"             Standing SLR adb/ext 10 x 2 ea  1# 2x10  3x10"  -  -             Mini squatts 10 x  on ball 2x10  On ball, 3x10  -  30x             Treadmill nv      2 5 mph, 10 mins @ 4%  2 5 mph % 10 mins4              Heel Rise with TKE - - 30x  on AE 30x  30x              Rocker Calf Stretch - - - -  4x30"              March - - - - 30x                                                                                                                                                                                           Modalities

## 2018-08-27 ENCOUNTER — OFFICE VISIT (OUTPATIENT)
Dept: PHYSICAL THERAPY | Facility: CLINIC | Age: 48
End: 2018-08-27
Payer: COMMERCIAL

## 2018-08-27 DIAGNOSIS — M25.562 LEFT KNEE PAIN, UNSPECIFIED CHRONICITY: Primary | ICD-10-CM

## 2018-08-27 PROCEDURE — 97110 THERAPEUTIC EXERCISES: CPT | Performed by: PHYSICAL THERAPIST

## 2018-08-27 NOTE — PROGRESS NOTES
Daily Note     Today's date: 2018  Patient name: Saeid Olvera  : 1970  MRN: [id number]  Referring provider: Ray Marc MD  Dx:   Encounter Diagnosis     ICD-10-CM    1  Left knee pain, unspecified chronicity M25 562                   Subjective: Pt  Notes knees are still feeling good, but is experiencing a significant headache today  Objective: See treatment diary below      Assessment: Pt  Did not tolerate treatment well today due to headache  She required many breaks and we cut session short  Pt  Advised to go home and rest and will continue per POC next session  Plan: Continue per plan of care  Progress treatment as tolerated        Precautions: None     Daily Treatment Diary      Manual             Patellar mobs 4 mn  4 min  5 min  -  10 min  10 min           Quad stretches prone :30 x 6  :30x5  4x30"  -  -  -                                                                                         Exercise Diary             [address] 10'  10'  10'  10'  5'  5'           Heel slides 10' x2    10x2"  10x2"  30x  30x           LAQ 10x  10x  30x with slump stretch  30x with slump stretch  30x  30x           SAQ :10 x 10  :10x10  -  10x  30x  30x           SLR supine flex 10 x  15x ea  30x  30x  30x  30x           SLR side 10x    10x  20x  30x  30x           Hamstring stretches :30 x 3  :30 x4  4x30"  4x30"  4x30"  4x30"           Standing SLR adb/ext 10 x 2 ea  1# 2x10  3x10"  -  -  30x           Mini squatts 10 x  on ball 2x10  On ball, 3x10  -  30x  -           Treadmill nv      2 5 mph, 10 mins @ 4%  2 5 mph % 10 mins4  -            Heel Rise with TKE - - 30x  on AE 30x  30x  -            Rocker Calf Stretch - - - -  4x30"  -             - - - - 30x  -                                                                                                                                                                                         Modalities

## 2018-08-28 ENCOUNTER — OFFICE VISIT (OUTPATIENT)
Dept: PHYSICAL THERAPY | Facility: CLINIC | Age: 48
End: 2018-08-28
Payer: COMMERCIAL

## 2018-08-28 DIAGNOSIS — M25.562 LEFT KNEE PAIN, UNSPECIFIED CHRONICITY: Primary | ICD-10-CM

## 2018-08-28 PROCEDURE — 97110 THERAPEUTIC EXERCISES: CPT | Performed by: PHYSICAL THERAPIST

## 2018-08-28 NOTE — PROGRESS NOTES
Daily Note     Today's date: 2018  Patient name: Sabina Vargas  : 1970  MRN: [id number]  Referring provider: Jennifer Dukes MD  Dx:   Encounter Diagnosis     ICD-10-CM    1  Left knee pain, unspecified chronicity M25 562                   Subjective: Pt  Notes minor anterior knee pain today  Remains congested with HA  Says it is reduced since yesteday  Objective: See treatment diary below      Assessment: Tolerated treatment well  Patient demonstrated fatigue post treatment, exhibited good technique with therapeutic exercises and would benefit from continued PT      Plan: Continue per plan of care  Progress treatment as tolerated          Precautions: None     Daily Treatment Diary      Manual           Patellar mobs 4 mn  4 min  5 min  -  10 min  10 min  10'         Quad stretches prone :30 x 6  :30x5  4x30"  -  -  -                                                                                         Exercise Diary           [address] [facility] 10'  10'  10'  10'  5'  5'  10'         Heel slides 10' x2    10x2"  10x2"  30x  30x  x30         LAQ 10x  10x  30x with slump stretch  30x with slump stretch  30x  30x 30x         SAQ :10 x 10  :10x10  -  10x  30x  30x  x30         SLR supine flex 10 x  15x ea  30x  30x  30x  30x  x30         SLR side 10x    10x  20x  30x  30x  x30         Hamstring stretches :30 x 3  :30 x4  4x30"  4x30"  4x30"  4x30" 30"x4         Standing SLR adb/ext 10 x 2 ea  1# 2x10  3x10"  -  -  30x  x30 ea         Mini squatts 10 x  on ball 2x10  On ball, 3x10  -  30x  -           Treadmill nv      2 5 mph, 10 mins @ 4%  2 5 mph % 10 mins4  -            Heel Rise with TKE - - 30x  on AE 30x  30x  -            Rocker Calf Stretch - - - -  4x30"  -            Marches - - - - 30x  -            step ups              4"x30          lateral step downs              2"x30                                                                                                                                       Modalities

## 2018-08-29 ENCOUNTER — APPOINTMENT (OUTPATIENT)
Dept: PHYSICAL THERAPY | Facility: CLINIC | Age: 48
End: 2018-08-29
Payer: COMMERCIAL

## 2018-08-30 ENCOUNTER — EVALUATION (OUTPATIENT)
Dept: PHYSICAL THERAPY | Facility: CLINIC | Age: 48
End: 2018-08-30
Payer: COMMERCIAL

## 2018-08-30 DIAGNOSIS — M25.562 LEFT KNEE PAIN, UNSPECIFIED CHRONICITY: Primary | ICD-10-CM

## 2018-08-30 PROCEDURE — 97140 MANUAL THERAPY 1/> REGIONS: CPT | Performed by: PHYSICAL THERAPIST

## 2018-08-30 PROCEDURE — 97110 THERAPEUTIC EXERCISES: CPT | Performed by: PHYSICAL THERAPIST

## 2018-08-30 NOTE — PROGRESS NOTES
PT Evaluation     Today's date: 2018  Patient name: Greg Woods  : 1970  MRN: [id number]  Referring provider: Rosas Carter MD  Dx:   Encounter Diagnosis     ICD-10-CM    1  Left knee pain, unspecified chronicity M25 562                   Assessment  Impairments: abnormal gait, abnormal or restricted ROM, activity intolerance, impaired balance, impaired physical strength, lacks appropriate home exercise program, pain with function, weight-bearing intolerance and poor body mechanics  Functional limitations: Squatting, walking over 1 mile, standing over 1 hour  Assessment details: Greg Woods has continued with PT 2-3x/week, progressing as tolerated with treatment for knee pain  Pt  Is demonstrating improved objective measures and functional performance, but deficits remain and are limiting daily life  Pt  Would benefit from continued PT to further promote maximum activity tolerance and [facility]   Understanding of Dx/Px/POC: good  Goals    Short Term Goals:  - Decrease pain by 50% in 3 weeks - Met  - Increase ROM by 50% in 4 weeks - Partially met (lacking 10 degrees)  - Increase strength by 50% in 4 weeks - Met    Long Term Goals:  - Independent with comprehensive home exercise program at discharge  - Increase Functional Status Measure to: 50 (FOTO) - Partially Met  - Increase strength equal to contralateral side at discharge - Partially Met  - Increase ROM to [facility] at discharge -Partially Met    Plan  Patient would benefit from: skilled physical therapy  Planned modality interventions: cryotherapy, unattended electrical stimulation and thermotherapy: hydrocollator packs  Planned therapy interventions: abdominal trunk stabilization, activity modification, balance, balance/weight bearing training, body mechanics training, home exercise program, stretching, strengthening, patient education, neuromuscular re-education, manual therapy and joint mobilization  Frequency: 3x week  Duration in weeks: 4  Treatment plan discussed with: patient        Subjective Evaluation    History of Present Illness  Mechanism of injury: Pt  Has continued with PT 2-3x/week, progressing as tolerated  She is pleased with reduction in knee pain and feels her legs are starting to become a little stronger  She remains limited with funcitonal tasks as her knees can not tolerate significant bending with weight beraing which is causing functional deficits including squatting and stairs  She notes motivation to continue with PT  Pain  Current pain ratin  At best pain ratin  At worst pain ratin    Social Support  Steps to enter house: yes  Lives with: spouse      Diagnostic Tests  X-ray: abnormal  Patient Goals  Patient goals for therapy: decreased edema, decreased pain, improved balance, increased motion, increased strength, return to sport/leisure activities and independence with ADLs/IADLs          Objective     Observations   Left Knee   Positive for edema  Right Knee   Positive for edema  Tenderness   Left Knee   Tenderness in the medial joint line  Right Knee   Tenderness in the medial joint line       Neurological Testing     Sensation     Knee   Left Knee   Intact: light touch    Right Knee   Intact: light touch     Reflexes   Left   Patellar (L4): normal (2+)    Right   Patellar (L4): normal (2+)    Active Range of Motion   Left Knee   Flexion: 100 degrees with pain  Extension: -1 degrees     Right Knee   Flexion: 106 degrees   Extension: -1 degrees     Patellar Static Positioning   Left Knee: lateral tilt and erin  Right Knee: lateral tilt and erin    Strength/Myotome Testing     Left Knee   Flexion: 4  Extension: 4  Quadriceps contraction: fair    Right Knee   Flexion: 4+  Extension: 4+  Quadriceps contraction: good    Swelling     Left Knee Girth Measurement (cm)   Joint line: 43 cm    Right Knee Girth Measurement (cm)   Joint line: 43 cm      Precautions: None     Daily Treatment Diary    Manual  8/30               Patellar mobs 10'               Quad stretches prone                                                                            Exercise Diary  8/30               [address] 10'               Heel slides 30x               LAQ 30x               SAQ 30x               SLR supine flex 30x               SLR side 30x               Hamstring stretches 4x30"               Standing SLR adb/ext 30x               Mini squatts 30x               Treadmill 5 min                Heel Rise with TKE 30x                Rocker Calf Stretch -                Marches -                step ups -                lateral step downs  -                                                                                                               Modalities

## 2018-08-31 ENCOUNTER — APPOINTMENT (OUTPATIENT)
Dept: PHYSICAL THERAPY | Facility: CLINIC | Age: 48
End: 2018-08-31
Payer: COMMERCIAL

## 2018-09-03 ENCOUNTER — APPOINTMENT (OUTPATIENT)
Dept: PHYSICAL THERAPY | Facility: CLINIC | Age: 48
End: 2018-09-03
Payer: COMMERCIAL

## 2018-09-04 ENCOUNTER — OFFICE VISIT (OUTPATIENT)
Dept: PHYSICAL THERAPY | Facility: CLINIC | Age: 48
End: 2018-09-04
Payer: COMMERCIAL

## 2018-09-04 DIAGNOSIS — M25.562 LEFT KNEE PAIN, UNSPECIFIED CHRONICITY: Primary | ICD-10-CM

## 2018-09-04 PROCEDURE — G8979 MOBILITY GOAL STATUS: HCPCS | Performed by: PHYSICAL THERAPIST

## 2018-09-04 PROCEDURE — 97140 MANUAL THERAPY 1/> REGIONS: CPT | Performed by: PHYSICAL THERAPIST

## 2018-09-04 PROCEDURE — 97110 THERAPEUTIC EXERCISES: CPT | Performed by: PHYSICAL THERAPIST

## 2018-09-04 PROCEDURE — G8980 MOBILITY D/C STATUS: HCPCS | Performed by: PHYSICAL THERAPIST

## 2018-09-04 NOTE — PROGRESS NOTES
Daily Note/DC Summary    Today's date: 2018  Patient name: Cecily Lees  : 1970  MRN: [id number]  Referring provider: Pati Monge MD  Dx:   Encounter Diagnosis     ICD-10-CM    1  Left knee pain, unspecified chronicity M25 562                   Subjective: Pt  States she is feeling a little better but has not taken z pack  She notes pain is improving in knees but activity tolerance remains reduced  Objective: See treatment diary below      Assessment: Tolerated treatment well  Patient demonstrated fatigue post treatment, exhibited good technique with therapeutic exercises and would benefit from continued PT      Plan: Continue per plan of care  Progress treatment as tolerated  Precautions: None     Daily Treatment Diary      Manual                Patellar mobs 10' 10'              Quad stretches prone - 4x30"                                                                          Exercise Diary                [address] [facility] 10' 10'              Heel slides 30x 30x              LAQ 30x 30x              SAQ 30x 30x              SLR supine flex 30x 30x              SLR side 30x 30x              Hamstring stretches 4x30" 4x30"              Standing SLR adb/ext 30x 30z ea              Mini squatts 30x 30x              Treadmill 5 min 10 min 2 6 mph               Heel Rise with TKE 30x 30x               Rocker Calf Stretch - -               Marches - 30x               step ups - -               lateral step downs  - -                                                                                                              Modalities                                                                          DC summary: 10/19/18 Pt  Has not scheduled any new appointments at this time  She was instructed to f/u prn

## 2018-09-05 ENCOUNTER — APPOINTMENT (OUTPATIENT)
Dept: PHYSICAL THERAPY | Facility: CLINIC | Age: 48
End: 2018-09-05
Payer: COMMERCIAL

## 2018-09-06 ENCOUNTER — APPOINTMENT (OUTPATIENT)
Dept: PHYSICAL THERAPY | Facility: CLINIC | Age: 48
End: 2018-09-06
Payer: COMMERCIAL

## 2018-09-07 ENCOUNTER — APPOINTMENT (OUTPATIENT)
Dept: PHYSICAL THERAPY | Facility: CLINIC | Age: 48
End: 2018-09-07
Payer: COMMERCIAL

## 2018-09-10 ENCOUNTER — APPOINTMENT (OUTPATIENT)
Dept: PHYSICAL THERAPY | Facility: CLINIC | Age: 48
End: 2018-09-10
Payer: COMMERCIAL

## 2018-09-10 ENCOUNTER — OFFICE VISIT (OUTPATIENT)
Dept: PHYSICAL THERAPY | Facility: CLINIC | Age: 48
End: 2018-09-10
Payer: COMMERCIAL

## 2018-09-10 DIAGNOSIS — M25.562 LEFT KNEE PAIN, UNSPECIFIED CHRONICITY: Primary | ICD-10-CM

## 2018-09-10 PROCEDURE — 97110 THERAPEUTIC EXERCISES: CPT | Performed by: PHYSICAL THERAPIST

## 2018-09-10 PROCEDURE — 97140 MANUAL THERAPY 1/> REGIONS: CPT | Performed by: PHYSICAL THERAPIST

## 2018-09-10 NOTE — PROGRESS NOTES
Daily Note     Today's date: 9/10/2018  Patient name: Genesis Ernandez  : 1970  MRN: [id number]  Referring provider: Tory Roger MD  Dx:   Encounter Diagnosis     ICD-10-CM    1  Left knee pain, unspecified chronicity M25 562                   Subjective: Pt  Feels she is doing well and low back pain is continuing to reduce  Work remains a major agitator  Objective: See treatment diary below      Assessment: Genesis Ernandez was progressed with PT interventions, focusing on appropriate technique and intensity  Pt  Required mod cuing from therapist to complete  Pt  Would benefit from continued physical therapy to promote improved activity tolerance and function  Plan: Continue per plan of care  Progress treatment as tolerated        Precautions: None     Daily Treatment Diary      Manual  8/30 9/4 9/10             Patellar mobs 10' 10' 10'             Quad stretches prone - 4x30"                                                                          Exercise Diary  8/30 9/4 9/10             [address] 10' 10' 10'             Heel slides 30x 30x 30x             LAQ 30x 30x 30x             SAQ 30x 30x 30x             SLR supine flex 30x 30x 30x             SLR side 30x 30x 30x             Hamstring stretches 4x30" 4x30"              Standing SLR adb/ext 30x 30z ea 30x ea             Mini squatts 30x 30x 30x             Treadmill 5 min 10 min 2 6 mph -              Heel Rise with TKE 30x 30x 30x              Rocker Calf Stretch - - -              Marches - 30x               step ups - -               lateral step downs  - -                                                                                                              Modalities

## 2018-09-12 ENCOUNTER — APPOINTMENT (OUTPATIENT)
Dept: PHYSICAL THERAPY | Facility: CLINIC | Age: 48
End: 2018-09-12
Payer: COMMERCIAL

## 2018-09-13 ENCOUNTER — APPOINTMENT (OUTPATIENT)
Dept: PHYSICAL THERAPY | Facility: CLINIC | Age: 48
End: 2018-09-13
Payer: COMMERCIAL

## 2018-09-13 ENCOUNTER — OFFICE VISIT (OUTPATIENT)
Dept: URGENT CARE | Facility: CLINIC | Age: 48
End: 2018-09-13
Payer: COMMERCIAL

## 2018-09-13 VITALS
BODY MASS INDEX: 42.33 KG/M2 | HEART RATE: 76 BPM | TEMPERATURE: 98.9 F | SYSTOLIC BLOOD PRESSURE: 154 MMHG | DIASTOLIC BLOOD PRESSURE: 79 MMHG | OXYGEN SATURATION: 100 % | RESPIRATION RATE: 18 BRPM | WEIGHT: 230 LBS | HEIGHT: 62 IN

## 2018-09-13 DIAGNOSIS — R42 DIZZINESS: Primary | ICD-10-CM

## 2018-09-13 PROCEDURE — 99213 OFFICE O/P EST LOW 20 MIN: CPT | Performed by: PHYSICIAN ASSISTANT

## 2018-09-13 NOTE — PROGRESS NOTES
[facility]        NAME: Evelin Talbert is a 50 y o  female  : 1970    MRN: [id number]  DATE: 2018  TIME: 9:05 AM    Assessment and Plan   Dizziness [R42]  1  Dizziness           Patient Instructions     Pt to [address] for further evaluation    Chief Complaint     Chief Complaint   Patient presents with    Dizziness     started a couple days ago  c/o pressure in ears as well  History of Present Illness       41-year-old female presents with  complaining of dizziness and head pressure x2 days  Patient states dizziness started suddenly  Patient endorses difficulty ambulating because she cannot walk in a straight line and frequently loses her balance  Patient states the dizziness only occurs when she moves her head or closes her eyes  Patient describes dizziness as if entire room was moving around her  Patient states dizziness is constant and not episodic  Patient also endorses weakness and tingling in her right arm  Patient took 1 dose of her sons vertigo medicine without improvement  Patient states that headache is located in the center top of her head with 8/10 pain  Pain is constant  Dizziness   This is a new problem  Episode onset: 2 days ago  The problem occurs constantly  The problem has been gradually worsening  Associated symptoms include congestion and coughing  Pertinent negatives include no abdominal pain, anorexia, arthralgias, change in bowel habit, chest pain, chills, diaphoresis, fatigue, fever, headaches, joint swelling, myalgias, nausea, neck pain, numbness, rash, sore throat, swollen glands, urinary symptoms, vertigo, visual change, vomiting or weakness  Associated symptoms comments: Pain in neck  Exacerbated by: light  She has tried nothing for the symptoms  Review of Systems   Review of Systems   Constitutional: Negative for chills, diaphoresis, fatigue and fever  HENT: Positive for congestion  Negative for sore throat  Respiratory: Positive for cough  Negative for apnea, choking, chest tightness, shortness of breath, wheezing and stridor  Cardiovascular: Negative for chest pain, palpitations and leg swelling  Gastrointestinal: Negative for abdominal distention, abdominal pain, anal bleeding, anorexia, blood in stool, change in bowel habit, constipation, diarrhea, nausea, rectal pain and vomiting  Musculoskeletal: Negative for arthralgias, back pain, gait problem, joint swelling, myalgias, neck pain and neck stiffness  Skin: Negative for rash  Neurological: Positive for dizziness  Negative for vertigo, tremors, seizures, syncope, facial asymmetry, speech difficulty, weakness, light-headedness, numbness and headaches           Current Medications       Current Outpatient Prescriptions:     amLODIPine (NORVASC) 10 mg tablet, Take 10 mg by mouth daily, Disp: , Rfl:     cloNIDine (CATAPRES) 0 2 mg tablet, Take 0 2 mg by mouth daily at bedtime, Disp: , Rfl:     Fesoterodine Fumarate ER 4 MG TB24, Take 1 tablet (4 mg total) by mouth daily at bedtime, Disp: 30 tablet, Rfl: 1    gabapentin (NEURONTIN) 600 MG tablet, TAKE 1 Tablet BY MOUTH TWICE DAILY FOR PAIN AND SLEEP, Disp: , Rfl: 0    hydrOXYzine HCL (ATARAX) 25 mg tablet, Take 25 mg by mouth every 6 (six) hours as needed for itching, Disp: , Rfl:     leflunomide (ARAVA) 20 MG tablet, Take 20 mg by mouth daily, Disp: , Rfl:     montelukast (SINGULAIR) 10 mg tablet, Take 10 mg by mouth daily at bedtime, Disp: , Rfl:     pramipexole (MIRAPEX) 1 mg tablet, Take 0 25 mg by mouth 3 (three) times a day, Disp: , Rfl:     SUMAtriptan (IMITREX) 100 mg tablet, Take 100 mg by mouth once as needed for migraine, Disp: , Rfl:     topiramate (TOPAMAX) 50 MG tablet, TAKE 1 TABLET BY MOUTH EVERY DAY AT NIGHT, Disp: , Rfl: 5    Current Allergies     Allergies as of 09/13/2018 - Reviewed 09/13/2018   Allergen Reaction Noted    Ibuprofen  03/15/2018    Lisinopril  03/15/2018    Red dye  03/15/2018    Latex Rash 03/15/2018            The following portions of the patient's history were reviewed and updated as appropriate: allergies, current medications, past family history, past medical history, past social history, past surgical history and problem list      Past Medical History:   Diagnosis Date    Angioedema     Diverticulitis     Hypertension     Migraine     Overactive bladder     Restless leg        Past Surgical History:   Procedure Laterality Date    HYSTERECTOMY      TONSILLECTOMY         Family History   Problem Relation Age of Onset    Diabetes Mother     Hypertension Mother     Heart disease Father     Hypertension Father          Medications have been verified  Objective   /79   Pulse 76   Temp 98 9 °F (37 2 °C) (Tympanic)   Resp 18   Ht 5' 2" (1 575 m)   Wt 104 kg (230 lb)   SpO2 100%   BMI 42 07 kg/m²        Physical Exam     Physical Exam   Constitutional: She is oriented to person, place, and time  She appears well-developed and well-nourished  HENT:   Head: Normocephalic  Right Ear: External ear normal    Left Ear: External ear normal    Nose: Mucosal edema (left sided) present  No rhinorrhea  Mouth/Throat: Oropharynx is clear and moist  No oropharyngeal exudate  Eyes: Conjunctivae and EOM are normal  Pupils are equal, round, and reactive to light  Cardiovascular: Normal rate, regular rhythm, normal heart sounds and intact distal pulses  Exam reveals no gallop and no friction rub  No murmur heard  Pulmonary/Chest: Effort normal and breath sounds normal  No respiratory distress  She has no decreased breath sounds  She has no wheezes  She has no rhonchi  She has no rales  Neurological: She is alert and oriented to person, place, and time  She displays normal reflexes  No cranial nerve deficit or sensory deficit  She exhibits normal muscle tone   Gait abnormal  Coordination normal

## 2018-09-14 ENCOUNTER — APPOINTMENT (OUTPATIENT)
Dept: PHYSICAL THERAPY | Facility: CLINIC | Age: 48
End: 2018-09-14
Payer: COMMERCIAL

## 2018-09-19 ENCOUNTER — APPOINTMENT (OUTPATIENT)
Dept: PHYSICAL THERAPY | Facility: CLINIC | Age: 48
End: 2018-09-19
Payer: COMMERCIAL

## 2018-09-20 ENCOUNTER — OFFICE VISIT (OUTPATIENT)
Dept: URGENT CARE | Facility: CLINIC | Age: 48
End: 2018-09-20
Payer: COMMERCIAL

## 2018-09-20 VITALS
RESPIRATION RATE: 18 BRPM | SYSTOLIC BLOOD PRESSURE: 185 MMHG | WEIGHT: 230 LBS | OXYGEN SATURATION: 97 % | HEIGHT: 62 IN | TEMPERATURE: 99.3 F | DIASTOLIC BLOOD PRESSURE: 89 MMHG | HEART RATE: 84 BPM | BODY MASS INDEX: 42.33 KG/M2

## 2018-09-20 DIAGNOSIS — J06.9 UPPER RESPIRATORY TRACT INFECTION, UNSPECIFIED TYPE: Primary | ICD-10-CM

## 2018-09-20 PROCEDURE — 99213 OFFICE O/P EST LOW 20 MIN: CPT | Performed by: PHYSICIAN ASSISTANT

## 2018-09-20 RX ORDER — METHYLPREDNISOLONE 4 MG/1
TABLET ORAL
Qty: 21 TABLET | Refills: 0 | Status: SHIPPED | OUTPATIENT
Start: 2018-09-20 | End: 2019-06-27

## 2018-09-20 NOTE — PROGRESS NOTES
[facility]        NAME: Radha Washburn is a 50 y o  female  : 1970    MRN: [id number]  DATE: 2018  TIME: 4:16 PM    Assessment and Plan   Upper respiratory tract infection, unspecified type [J06 9]  1  Upper respiratory tract infection, unspecified type  Methylprednisolone 4 MG TBPK     Patient Instructions     Take steroid as prescribed  C/w otc meds as needed for sxs control   Follow up with PCP in 3-5 days  Proceed to  ER if symptoms worsen  Chief Complaint     Chief Complaint   Patient presents with    Nasal Congestion     achy ear pressutre headache since yesterday          History of Present Illness       URI    This is a new problem  The current episode started yesterday  The problem has been unchanged  There has been no fever  Associated symptoms include congestion, coughing, rhinorrhea, sinus pain, a sore throat and swollen glands  Pertinent negatives include no abdominal pain, chest pain, diarrhea, dysuria, ear pain, headaches, joint pain, joint swelling, nausea, neck pain, plugged ear sensation, rash, sneezing, vomiting or wheezing  She has tried nothing for the symptoms  Review of Systems   Review of Systems   Constitutional: Negative for activity change, appetite change, chills, diaphoresis, fatigue, fever and unexpected weight change  HENT: Positive for congestion, rhinorrhea, sinus pain and sore throat  Negative for ear pain and sneezing  Respiratory: Positive for cough  Negative for apnea, choking, chest tightness, shortness of breath, wheezing and stridor  Cardiovascular: Negative for chest pain  Gastrointestinal: Negative for abdominal pain, diarrhea, nausea and vomiting  Genitourinary: Negative for dysuria  Musculoskeletal: Negative for joint pain and neck pain  Skin: Negative for rash  Neurological: Negative for headaches           Current Medications       Current Outpatient Prescriptions:     amLODIPine (NORVASC) 10 mg tablet, Take 10 mg by mouth daily, Disp: , Rfl:     cloNIDine (CATAPRES) 0 2 mg tablet, Take 0 2 mg by mouth daily at bedtime, Disp: , Rfl:     Fesoterodine Fumarate ER 4 MG TB24, Take 1 tablet (4 mg total) by mouth daily at bedtime, Disp: 30 tablet, Rfl: 1    gabapentin (NEURONTIN) 600 MG tablet, TAKE 1 Tablet BY MOUTH TWICE DAILY FOR PAIN AND SLEEP, Disp: , Rfl: 0    hydrOXYzine HCL (ATARAX) 25 mg tablet, Take 25 mg by mouth every 6 (six) hours as needed for itching, Disp: , Rfl:     Methylprednisolone 4 MG TBPK, Use as directed on package, Disp: 21 tablet, Rfl: 0    montelukast (SINGULAIR) 10 mg tablet, Take 10 mg by mouth daily at bedtime, Disp: , Rfl:     pramipexole (MIRAPEX) 1 mg tablet, Take 0 25 mg by mouth 3 (three) times a day, Disp: , Rfl:     SUMAtriptan (IMITREX) 100 mg tablet, Take 100 mg by mouth once as needed for migraine, Disp: , Rfl:     Current Allergies     Allergies as of 09/20/2018 - Reviewed 09/20/2018   Allergen Reaction Noted    Ibuprofen  03/15/2018    Lisinopril  03/15/2018    Red dye  03/15/2018    Latex Rash 03/15/2018            The following portions of the patient's history were reviewed and updated as appropriate: allergies, current medications, past family history, past medical history, past social history, past surgical history and problem list      Past Medical History:   Diagnosis Date    Angioedema     Diverticulitis     Hypertension     Migraine     Overactive bladder     Restless leg        Past Surgical History:   Procedure Laterality Date    HYSTERECTOMY      TONSILLECTOMY         Family History   Problem Relation Age of Onset    Diabetes Mother     Hypertension Mother     Heart disease Father     Hypertension Father          Medications have been verified          Objective   BP (!) 185/89   Pulse 84   Temp 99 3 °F (37 4 °C)   Resp 18   Ht 5' 2" (1 575 m)   Wt 104 kg (230 lb)   SpO2 97%   BMI 42 07 kg/m²        Physical Exam     Physical Exam Constitutional: She appears well-developed and well-nourished  HENT:   Head: Normocephalic  Right Ear: External ear normal    Left Ear: External ear normal    Nose: Mucosal edema and rhinorrhea present  Mouth/Throat: Posterior oropharyngeal erythema present  No oropharyngeal exudate or posterior oropharyngeal edema  Cardiovascular: Normal rate, regular rhythm, normal heart sounds and intact distal pulses  Exam reveals no friction rub  No murmur heard  Pulmonary/Chest: Effort normal and breath sounds normal  No respiratory distress  She has no decreased breath sounds  She has no wheezes  She has no rhonchi  She has no rales  Dry nonproductive cough   Abdominal: Soft  Bowel sounds are normal  She exhibits no distension  There is no tenderness  There is no rebound and no guarding

## 2018-09-21 ENCOUNTER — APPOINTMENT (OUTPATIENT)
Dept: PHYSICAL THERAPY | Facility: CLINIC | Age: 48
End: 2018-09-21
Payer: COMMERCIAL

## 2018-09-25 ENCOUNTER — APPOINTMENT (OUTPATIENT)
Dept: PHYSICAL THERAPY | Facility: CLINIC | Age: 48
End: 2018-09-25
Payer: COMMERCIAL

## 2018-09-27 ENCOUNTER — APPOINTMENT (OUTPATIENT)
Dept: PHYSICAL THERAPY | Facility: CLINIC | Age: 48
End: 2018-09-27
Payer: COMMERCIAL

## 2019-01-06 ENCOUNTER — OFFICE VISIT (OUTPATIENT)
Dept: URGENT CARE | Facility: CLINIC | Age: 49
End: 2019-01-06
Payer: COMMERCIAL

## 2019-01-06 ENCOUNTER — APPOINTMENT (OUTPATIENT)
Dept: RADIOLOGY | Facility: CLINIC | Age: 49
End: 2019-01-06
Payer: COMMERCIAL

## 2019-01-06 VITALS
OXYGEN SATURATION: 100 % | HEIGHT: 63 IN | SYSTOLIC BLOOD PRESSURE: 201 MMHG | BODY MASS INDEX: 41.64 KG/M2 | TEMPERATURE: 98 F | RESPIRATION RATE: 18 BRPM | WEIGHT: 235 LBS | DIASTOLIC BLOOD PRESSURE: 105 MMHG | HEART RATE: 90 BPM

## 2019-01-06 DIAGNOSIS — M25.561 ACUTE PAIN OF RIGHT KNEE: ICD-10-CM

## 2019-01-06 DIAGNOSIS — M25.562 ACUTE PAIN OF LEFT KNEE: ICD-10-CM

## 2019-01-06 DIAGNOSIS — S80.02XA CONTUSION OF LEFT KNEE, INITIAL ENCOUNTER: Primary | ICD-10-CM

## 2019-01-06 PROCEDURE — 73564 X-RAY EXAM KNEE 4 OR MORE: CPT

## 2019-01-06 PROCEDURE — 99213 OFFICE O/P EST LOW 20 MIN: CPT | Performed by: FAMILY MEDICINE

## 2019-01-06 RX ORDER — TRAMADOL HYDROCHLORIDE 50 MG/1
50 TABLET ORAL EVERY 6 HOURS PRN
Qty: 30 TABLET | Refills: 0 | Status: SHIPPED | OUTPATIENT
Start: 2019-01-06 | End: 2019-06-27

## 2019-01-06 NOTE — PROGRESS NOTES
Assessment/Plan:    Follow-up with family physician this week  Follow-up with Orthopedics if necessary  Diagnoses and all orders for this visit:    Contusion of left knee, initial encounter  -     traMADol (ULTRAM) 50 mg tablet; Take 1 tablet (50 mg total) by mouth every 6 (six) hours as needed for moderate pain    Acute pain of left knee  -     XR knee 4+ vw left injury; Future    Acute pain of right knee  -     XR knee 4+ vw right injury; Future  -     traMADol (ULTRAM) 50 mg tablet; Take 1 tablet (50 mg total) by mouth every 6 (six) hours as needed for moderate pain          Subjective:      Patient ID: Sherle Krabbe is a 50 y o  female  Patient presents with:  Knee Pain: fell on both knees earlier today  c/o pain in knees left greater than right          Knee Pain          The following portions of the patient's history were reviewed and updated as appropriate: allergies, current medications, past family history, past medical history, past social history, past surgical history and problem list     Review of Systems   Constitutional: Negative  HENT: Negative  Eyes: Negative  Respiratory: Negative  Cardiovascular: Negative  Gastrointestinal: Negative  Endocrine: Negative  Genitourinary: Negative  Musculoskeletal: Positive for arthralgias, gait problem and joint swelling  Skin: Negative  Allergic/Immunologic: Negative  Hematological: Negative  Psychiatric/Behavioral: Negative  All other systems reviewed and are negative  Objective:      BP (!) 201/105   Pulse 90   Temp 98 °F (36 7 °C) (Tympanic)   Resp 18   Ht 5' 3" (1 6 m)   Wt 107 kg (235 lb)   SpO2 100%   BMI 41 63 kg/m²          Physical Exam   Constitutional: She is oriented to person, place, and time  She appears well-developed and well-nourished  HENT:   Head: Normocephalic and atraumatic     Right Ear: External ear normal    Left Ear: External ear normal    Nose: Nose normal    Mouth/Throat: Oropharynx is clear and moist    Eyes: Pupils are equal, round, and reactive to light  Conjunctivae and EOM are normal    Neck: Normal range of motion  Neck supple  Cardiovascular: Normal rate, regular rhythm and normal heart sounds  Pulmonary/Chest: Effort normal and breath sounds normal    Abdominal: Soft  Bowel sounds are normal    Musculoskeletal: She exhibits edema and tenderness  Exam shows swelling both knees worse on the left side  She has tenderness over the patella  Neurovascular appears intact range of motion is decreased secondary to pain  Neurological: She is alert and oriented to person, place, and time  She has normal reflexes  Skin: Skin is warm and dry  Psychiatric: She has a normal mood and affect  Her behavior is normal    Nursing note and vitals reviewed

## 2019-06-18 ENCOUNTER — TELEPHONE (OUTPATIENT)
Dept: UROLOGY | Facility: MEDICAL CENTER | Age: 49
End: 2019-06-18

## 2019-06-27 ENCOUNTER — OFFICE VISIT (OUTPATIENT)
Dept: URGENT CARE | Facility: CLINIC | Age: 49
End: 2019-06-27
Payer: MEDICARE

## 2019-06-27 VITALS
HEART RATE: 98 BPM | OXYGEN SATURATION: 97 % | DIASTOLIC BLOOD PRESSURE: 108 MMHG | SYSTOLIC BLOOD PRESSURE: 158 MMHG | HEIGHT: 62 IN | BODY MASS INDEX: 41.04 KG/M2 | RESPIRATION RATE: 18 BRPM | WEIGHT: 223 LBS | TEMPERATURE: 98 F

## 2019-06-27 DIAGNOSIS — T78.3XXA ANGIOEDEMA, INITIAL ENCOUNTER: Primary | ICD-10-CM

## 2019-06-27 PROCEDURE — 99213 OFFICE O/P EST LOW 20 MIN: CPT | Performed by: PHYSICIAN ASSISTANT

## 2019-06-27 PROCEDURE — G0463 HOSPITAL OUTPT CLINIC VISIT: HCPCS | Performed by: PHYSICIAN ASSISTANT

## 2019-06-27 PROCEDURE — 96372 THER/PROPH/DIAG INJ SC/IM: CPT | Performed by: PHYSICIAN ASSISTANT

## 2019-06-27 RX ORDER — ATENOLOL 25 MG/1
TABLET ORAL
Refills: 0 | COMMUNITY
Start: 2019-05-07

## 2019-06-27 RX ORDER — BUPROPION HYDROCHLORIDE 150 MG/1
150 TABLET ORAL
COMMUNITY
Start: 2019-01-25 | End: 2022-06-27

## 2019-06-27 RX ORDER — EPINEPHRINE 1 MG/ML
0.5 INJECTION, SOLUTION, CONCENTRATE INTRAVENOUS ONCE
Status: COMPLETED | OUTPATIENT
Start: 2019-06-27 | End: 2019-06-27

## 2019-06-27 RX ORDER — OMEPRAZOLE 10 MG/1
20 CAPSULE, DELAYED RELEASE ORAL
COMMUNITY

## 2019-06-27 RX ORDER — METHYLPREDNISOLONE SODIUM SUCCINATE 125 MG/2ML
125 INJECTION, POWDER, LYOPHILIZED, FOR SOLUTION INTRAMUSCULAR; INTRAVENOUS ONCE
Status: COMPLETED | OUTPATIENT
Start: 2019-06-27 | End: 2019-06-27

## 2019-06-27 RX ORDER — DIPHENHYDRAMINE HYDROCHLORIDE 50 MG/ML
25 INJECTION INTRAMUSCULAR; INTRAVENOUS ONCE
Status: COMPLETED | OUTPATIENT
Start: 2019-06-27 | End: 2019-06-27

## 2019-06-27 RX ADMIN — EPINEPHRINE 0.5 MG: 1 INJECTION, SOLUTION, CONCENTRATE INTRAVENOUS at 17:43

## 2019-06-27 RX ADMIN — METHYLPREDNISOLONE SODIUM SUCCINATE 125 MG: 125 INJECTION, POWDER, LYOPHILIZED, FOR SOLUTION INTRAMUSCULAR; INTRAVENOUS at 17:42

## 2019-06-27 RX ADMIN — DIPHENHYDRAMINE HYDROCHLORIDE 25 MG: 50 INJECTION INTRAMUSCULAR; INTRAVENOUS at 17:43

## 2019-06-28 ENCOUNTER — TELEPHONE (OUTPATIENT)
Dept: UROLOGY | Facility: AMBULATORY SURGERY CENTER | Age: 49
End: 2019-06-28

## 2019-07-24 ENCOUNTER — TELEPHONE (OUTPATIENT)
Dept: UROLOGY | Facility: MEDICAL CENTER | Age: 49
End: 2019-07-24

## 2019-07-24 NOTE — TELEPHONE ENCOUNTER
Anjali from [employer] calling on status of medical records   [facility]#[id number]  [phone]

## 2019-07-25 NOTE — TELEPHONE ENCOUNTER
Medical records previously received, see 6/26/2019 note, records forwarded to [facility]

## 2019-11-14 ENCOUNTER — OFFICE VISIT (OUTPATIENT)
Dept: URGENT CARE | Facility: CLINIC | Age: 49
End: 2019-11-14
Payer: MEDICARE

## 2019-11-14 VITALS
DIASTOLIC BLOOD PRESSURE: 78 MMHG | WEIGHT: 223 LBS | OXYGEN SATURATION: 97 % | SYSTOLIC BLOOD PRESSURE: 167 MMHG | TEMPERATURE: 98 F | HEIGHT: 62 IN | HEART RATE: 85 BPM | RESPIRATION RATE: 18 BRPM | BODY MASS INDEX: 41.04 KG/M2

## 2019-11-14 DIAGNOSIS — T78.40XA ALLERGIC REACTION, INITIAL ENCOUNTER: ICD-10-CM

## 2019-11-14 DIAGNOSIS — H10.12 ALLERGIC CONJUNCTIVITIS OF LEFT EYE: Primary | ICD-10-CM

## 2019-11-14 PROCEDURE — G0463 HOSPITAL OUTPT CLINIC VISIT: HCPCS | Performed by: PHYSICIAN ASSISTANT

## 2019-11-14 PROCEDURE — 99213 OFFICE O/P EST LOW 20 MIN: CPT | Performed by: PHYSICIAN ASSISTANT

## 2019-11-14 RX ORDER — PRAMIPEXOLE DIHYDROCHLORIDE 1 MG/1
1 TABLET ORAL
COMMUNITY
Start: 2019-08-12

## 2019-11-14 RX ORDER — MELOXICAM 7.5 MG/1
7.5 TABLET ORAL
COMMUNITY
Start: 2019-07-17

## 2019-11-14 RX ORDER — TOPIRAMATE 50 MG/1
50 TABLET, FILM COATED ORAL
COMMUNITY
Start: 2019-05-07 | End: 2020-05-06

## 2019-11-14 RX ORDER — VENLAFAXINE HYDROCHLORIDE 75 MG/1
75 CAPSULE, EXTENDED RELEASE ORAL
COMMUNITY
Start: 2019-06-06

## 2019-11-14 RX ORDER — METFORMIN HYDROCHLORIDE 500 MG/1
1 TABLET, EXTENDED RELEASE ORAL
COMMUNITY
Start: 2019-05-09

## 2019-11-14 RX ORDER — KETOTIFEN FUMARATE 0.35 MG/ML
1 SOLUTION/ DROPS OPHTHALMIC 2 TIMES DAILY
Qty: 5 ML | Refills: 0 | Status: SHIPPED | OUTPATIENT
Start: 2019-11-14

## 2019-11-14 RX ORDER — LOVASTATIN 10 MG/1
10 TABLET ORAL
COMMUNITY
Start: 2019-08-12 | End: 2021-08-07

## 2019-11-14 RX ORDER — HYDROXYCHLOROQUINE SULFATE 200 MG/1
400 TABLET, FILM COATED ORAL DAILY
Refills: 5 | COMMUNITY
Start: 2019-10-12

## 2019-11-14 RX ORDER — PREDNISONE 10 MG/1
TABLET ORAL
Qty: 21 TABLET | Refills: 0 | Status: SHIPPED | OUTPATIENT
Start: 2019-11-14

## 2019-11-14 NOTE — PATIENT INSTRUCTIONS
Prednisone and Zaditor drops as prescribed  Claritin once per day and pepsid twice per day  Follow up with eye specialist within 24 hours   [facility] [phone]  Follow up with PCP in 3-5 days  Proceed to  ER if symptoms worsen  Conjunctivitis   WHAT YOU SHOULD KNOW:   Conjunctivitis, or pink eye, is inflammation of your conjunctiva  The conjunctiva is a thin tissue that covers the front of your eye and the back of your eyelids  The conjunctiva helps protect your eye and keep it moist         INSTRUCTIONS:   Medicines:   · Allergy medicine: This medicine helps decrease itchy, red, swollen eyes caused by allergies  It may be given as a pill, eye drops, or nasal spray  · Antibiotics:  You will need antibiotics if your conjunctivitis is caused by bacteria  This medicine may be given as eye drops or eye ointment  · Steroid medicine: This medicine helps decrease inflammation  It may be given as a pill, eye drops, or nasal spray  · Take your medicine as directed  Call your healthcare provider if you think your medicine is not helping or if you have side effects  Tell him if you are allergic to any medicine  Keep a list of the medicines, vitamins, and herbs you take  Include the amounts, and when and why you take them  Bring the list or the pill bottles to follow-up visits  Carry your medicine list with you in case of an emergency  Follow up with your primary healthcare provider as directed: You may need to return for more tests on your eyes  These will help your primary healthcare provider check for eye damage  Write down your questions so you remember to ask them during your visits  Avoid the spread of conjunctivitis:   · Wash your hands often:  Wash your hands before you touch your eyes  Also wash your hands before you prepare or eat food and after you use the bathroom or change a diaper      · Avoid allergens:  Try to avoid the things that cause your allergies, such as pets, dust, or grass  · Avoid contact:  Do not share towels or washcloths  Try to stay away from others as much as possible  Ask when you can return to work or school  · Throw away eye makeup:  Throw away mascara and other eye makeup  Manage your symptoms:  · Apply a cool compress:  Wet a washcloth with cold water and place it on your eye  This will help decrease swelling  · Use eye drops:  Eye drops, or artificial tears, can be bought without a doctor's order  They help keep your eye moist     · Do not wear contact lenses: They can irritate your eye  Throw away the pair you are using and ask when you can wear them again  Use a new pair of lenses when your primary healthcare provider says it is okay  · Flush your eye:  You may need to flush your eye with saline to help decrease your symptoms  Ask for more information on how to flush your eye  Contact your primary healthcare provider if:   · Your eyesight becomes blurry  · You have tiny bumps or spots of blood on your eye  · You have questions or concerns about your condition or care  Return to the emergency department if:   · The swelling in your eye gets worse, even after treatment  · Your vision suddenly becomes worse or you cannot see at all  · Your eye begins to bleed  © 2014 [address] is for End User's use only and may not be sold, redistributed or otherwise used for commercial purposes  All illustrations and images included in CareNotes® are the copyrighted property of [employer] A M [employer] Inc  or [employer]  The above information is an  only  It is not intended as medical advice for individual conditions or treatments  Talk to your doctor, nurse or pharmacist before following any medical regimen to see if it is safe and effective for you

## 2020-08-22 ENCOUNTER — OFFICE VISIT (OUTPATIENT)
Dept: URGENT CARE | Facility: CLINIC | Age: 50
End: 2020-08-22
Payer: COMMERCIAL

## 2020-08-22 VITALS
WEIGHT: 220 LBS | BODY MASS INDEX: 40.48 KG/M2 | HEIGHT: 62 IN | SYSTOLIC BLOOD PRESSURE: 198 MMHG | TEMPERATURE: 98.8 F | DIASTOLIC BLOOD PRESSURE: 95 MMHG | RESPIRATION RATE: 16 BRPM | OXYGEN SATURATION: 98 % | HEART RATE: 66 BPM

## 2020-08-22 DIAGNOSIS — J01.00 ACUTE NON-RECURRENT MAXILLARY SINUSITIS: ICD-10-CM

## 2020-08-22 DIAGNOSIS — Z20.822 ENCOUNTER FOR LABORATORY TESTING FOR COVID-19 VIRUS: Primary | ICD-10-CM

## 2020-08-22 PROCEDURE — 99213 OFFICE O/P EST LOW 20 MIN: CPT | Performed by: FAMILY MEDICINE

## 2020-08-22 PROCEDURE — U0003 INFECTIOUS AGENT DETECTION BY NUCLEIC ACID (DNA OR RNA); SEVERE ACUTE RESPIRATORY SYNDROME CORONAVIRUS 2 (SARS-COV-2) (CORONAVIRUS DISEASE [COVID-19]), AMPLIFIED PROBE TECHNIQUE, MAKING USE OF HIGH THROUGHPUT TECHNOLOGIES AS DESCRIBED BY CMS-2020-01-R: HCPCS | Performed by: FAMILY MEDICINE

## 2020-08-22 RX ORDER — AZITHROMYCIN 250 MG/1
TABLET, FILM COATED ORAL
Qty: 6 TABLET | Refills: 0 | Status: SHIPPED | OUTPATIENT
Start: 2020-08-22 | End: 2020-08-27

## 2020-08-22 RX ORDER — PANTOPRAZOLE SODIUM 20 MG/1
20 TABLET, DELAYED RELEASE ORAL DAILY
COMMUNITY

## 2020-08-22 NOTE — PROGRESS NOTES
Assessment/Plan:      Diagnoses and all orders for this visit:    Acute non-recurrent maxillary sinusitis  -     azithromycin (ZITHROMAX) 250 mg tablet; Take 2 tablets today then 1 tablet daily x 4 days    Other orders  -     pantoprazole (PROTONIX) 20 mg tablet; Take 20 mg by mouth daily        Recommend supportive care rest and fluids  Follow-up with family physician if no better in 7-10 days  I will call with test results  Subjective:     Patient ID: Fernanda Bhatti is a 48 y o  female  Patient presents with:  Facial Pain: c/o 3 week hx sinus pain and pressure, also with achiness to shoulders  Denies cough, sob, fever  Review of Systems   Constitutional: Positive for activity change  Negative for appetite change, fatigue and fever  HENT: Positive for sinus pressure and sinus pain  Eyes: Negative  Respiratory: Negative  Negative for cough and shortness of breath  Cardiovascular: Negative  Gastrointestinal: Negative  Negative for abdominal pain and diarrhea  Endocrine: Negative  Genitourinary: Negative  Musculoskeletal: Negative  Skin: Negative  Allergic/Immunologic: Negative  Neurological: Positive for headaches  Hematological: Negative  Psychiatric/Behavioral: Negative  All other systems reviewed and are negative  Objective:     Physical Exam  Vitals signs and nursing note reviewed  Constitutional:       Appearance: She is well-developed  HENT:      Head: Normocephalic and atraumatic  Right Ear: External ear normal       Left Ear: External ear normal       Nose: Nose normal    Eyes:      Conjunctiva/sclera: Conjunctivae normal       Pupils: Pupils are equal, round, and reactive to light  Neck:      Musculoskeletal: Normal range of motion and neck supple  Cardiovascular:      Rate and Rhythm: Normal rate and regular rhythm  Heart sounds: Normal heart sounds     Pulmonary:      Effort: Pulmonary effort is normal       Breath sounds: Normal breath sounds  Abdominal:      General: Bowel sounds are normal       Palpations: Abdomen is soft  Musculoskeletal: Normal range of motion  Skin:     General: Skin is warm and dry  Neurological:      Mental Status: She is alert and oriented to person, place, and time  Deep Tendon Reflexes: Reflexes are normal and symmetric     Psychiatric:         Behavior: Behavior normal

## 2020-08-24 ENCOUNTER — TELEPHONE (OUTPATIENT)
Dept: OTHER | Facility: OTHER | Age: 50
End: 2020-08-24

## 2020-08-24 LAB — SARS-COV-2 RNA SPEC QL NAA+PROBE: NOT DETECTED

## 2020-08-24 NOTE — TELEPHONE ENCOUNTER
Your test for COVID-19, also known as novel coronavirus, came back negative  You do not have COVID-19  If you have any additional questions, we can schedule a virtual visit for you with a provider or call the [facility] [phone] Option 7 for care advice  For additional information , please visit the Coronavirus FAQ on the [address] [address]  ([employer])  Patient requested a copy of her test results  Advised that test results can be printed out from My Chart  Patient verbalized understanding and denied having any questions

## 2020-09-20 ENCOUNTER — OFFICE VISIT (OUTPATIENT)
Dept: URGENT CARE | Facility: CLINIC | Age: 50
End: 2020-09-20
Payer: COMMERCIAL

## 2020-09-20 VITALS
HEIGHT: 62 IN | RESPIRATION RATE: 16 BRPM | WEIGHT: 218 LBS | TEMPERATURE: 98.2 F | BODY MASS INDEX: 40.12 KG/M2 | DIASTOLIC BLOOD PRESSURE: 77 MMHG | HEART RATE: 74 BPM | OXYGEN SATURATION: 96 % | SYSTOLIC BLOOD PRESSURE: 186 MMHG

## 2020-09-20 DIAGNOSIS — M62.838 MUSCLE SPASM: ICD-10-CM

## 2020-09-20 DIAGNOSIS — S39.012A STRAIN OF LUMBAR REGION, INITIAL ENCOUNTER: Primary | ICD-10-CM

## 2020-09-20 PROCEDURE — 99213 OFFICE O/P EST LOW 20 MIN: CPT | Performed by: NURSE PRACTITIONER

## 2020-09-20 RX ORDER — METHOCARBAMOL 500 MG/1
500 TABLET, FILM COATED ORAL 3 TIMES DAILY
Qty: 20 TABLET | Refills: 0 | Status: SHIPPED | OUTPATIENT
Start: 2020-09-20

## 2020-09-20 NOTE — PROGRESS NOTES
NAME: Cici Hoffmann is a 48 y o  female  : 1970    MRN: [id number]    BP (!) 186/77   Pulse 74   Temp 98 2 °F (36 8 °C)   Resp 16   Ht 5' 2" (1 575 m)   Wt 98 9 kg (218 lb)   SpO2 96%   BMI 39 87 kg/m²     Assessment and Plan   Strain of lumbar region, initial encounter [S39 012A]  1  Strain of lumbar region, initial encounter  methocarbamol (ROBAXIN) 500 mg tablet   2  Muscle spasm  methocarbamol (ROBAXIN) 500 mg tablet       Rosy David was seen today for back pain  Diagnoses and all orders for this visit:    Strain of lumbar region, initial encounter  -     methocarbamol (ROBAXIN) 500 mg tablet; Take 1 tablet (500 mg total) by mouth 3 (three) times a day    Muscle spasm  -     methocarbamol (ROBAXIN) 500 mg tablet; Take 1 tablet (500 mg total) by mouth 3 (three) times a day    During time of exam patient was unable to stand up straight due to pain and assessed her at approximately a 45 degree angle  However on discharge patient was able to ambulate out of exam room with her back straight with a slow steady gait  Patient Instructions   Patient Instructions     Take meds as directed  Follow up with pcp, ortho  If worse go to the ER  Take tylenol, use lidocaine patches that you have at home  Muscle relaxer RX today      Back Pain   WHAT YOU NEED TO KNOW:   Back pain is common  It can be caused by many conditions, such as arthritis or the breakdown of spinal discs  Your risk for back pain is increased by injuries, lack of activity, or repeated bending and twisting  You may feel sore or stiff on one or both sides of your back  The pain may spread to your buttocks or thighs  DISCHARGE INSTRUCTIONS:   Medicines:   · NSAIDs  help decrease swelling and pain  This medicine is available with or without a doctor's order  NSAIDs can cause stomach bleeding or kidney problems in certain people  If you take blood thinner medicine, always ask your healthcare provider if NSAIDs are safe for you   Always read the medicine label and follow directions  · Acetaminophen  decreases pain  It is available without a doctor's order  Ask how much to take and how often to take it  Follow directions  Acetaminophen can cause liver damage if not taken correctly  · Prescription pain medicine  may be given  Ask your healthcare provider how to take this medicine safely  · Take your medicine as directed  Contact your healthcare provider if you think your medicine is not helping or if you have side effects  Tell him or her if you are allergic to any medicine  Keep a list of the medicines, vitamins, and herbs you take  Include the amounts, and when and why you take them  Bring the list or the pill bottles to follow-up visits  Carry your medicine list with you in case of an emergency  Follow up with your healthcare provider in 2 weeks, or as directed:  Write down your questions so you remember to ask them during your visits  How to manage your back pain:   · Apply ice  on your back or affected area for 15 to 20 minutes every hour or as directed  Use an ice pack, or put crushed ice in a plastic bag  Cover it with a towel  Ice helps prevent tissue damage and decreases pain  · Apply heat  on your back or affected area for 20 to 30 minutes every 2 hours for as many days as directed  Heat helps decrease pain and muscle spasms  · Stay active  as much as you can without causing more pain  Bed rest could make your back pain worse  Avoid heavy lifting until your pain is gone  Return to the emergency department if:   · You have pain, numbness, or weakness in one or both legs  · Your pain becomes so severe that you cannot walk  · You cannot control your urine or bowel movements  · You have severe back pain with chest pain  · You have severe back pain, nausea, and vomiting  · You have severe back pain that spreads to your side or genital area    Contact your healthcare provider if:   · You have back pain that does not get better with rest and pain medicine  · You have a fever  · You have pain that worsens when you are on your back or when you rest     · You have pain that worsens when you cough or sneeze  · You lose weight without trying  · You have questions or concerns about your condition or care  © 2017 [employer] Information is for End User's use only and may not be sold, redistributed or otherwise used for commercial purposes  All illustrations and images included in CareNotes® are the copyrighted property of [employer]  or [employer]  The above information is an  only  It is not intended as medical advice for individual conditions or treatments  Talk to your doctor, nurse or pharmacist before following any medical regimen to see if it is safe and effective for you  Muscle Strain   WHAT YOU NEED TO KNOW:   A muscle strain is a twist, pull, or tear of a muscle or tendon  A tendon is a strong elastic tissue that connects a muscle to a bone  Signs of a strained muscle include bruising and swelling over the area, pain with movement, and loss of strength  DISCHARGE INSTRUCTIONS:   Return to the emergency department if:   · You suddenly cannot feel or move your injured muscle  Contact your healthcare provider if:   · Your pain and swelling worsen or do not go away  · You have questions or concerns about your condition or care  Medicines:   · NSAIDs  help decrease swelling and pain or fever  This medicine is available with or without a doctor's order  NSAIDs can cause stomach bleeding or kidney problems in certain people  If you take blood thinner medicine, always ask your healthcare provider if NSAIDs are safe for you  Always read the medicine label and follow directions  · Muscle relaxers  help decrease pain and muscle spasms  · Take your medicine as directed    Contact your healthcare provider if you think your medicine is not helping or if you have side effects  Tell him of her if you are allergic to any medicine  Keep a list of the medicines, vitamins, and herbs you take  Include the amounts, and when and why you take them  Bring the list or the pill bottles to follow-up visits  Carry your medicine list with you in case of an emergency  Follow up with your healthcare provider as directed: Your healthcare provider may suggest that you have a follow-up visit before you go back to your usual activity  Write down your questions so you remember to ask them during your visits  Self-care:   · 3 to 7 days after the injury:  Use Rest, Ice, Compression, and Elevation (RICE) to help stop bruising and decrease pain and swelling  ¨ Rest:  Rest your muscle to allow your injury to heal  When the pain decreases, begin normal, slow movements  For mild and moderate muscle strains, you should rest your muscles for about 2 days  However, if you have a severe muscle strain, you should rest for 10 to 14 days  You may need to use crutches to walk if your muscle strain is in your legs or lower body  ¨ Ice:  Put an ice pack on the injured area  Put a towel between the ice pack and your skin  Do not put the ice pack directly on your skin  You can use a package of frozen peas instead of an ice pack  ¨ Compression:  You may need to wrap an elastic bandage around the area to decrease swelling  It should be tight enough for you to feel support  Do not wrap it too tightly  ¨ Elevation:  Keep the injured muscle raised above your heart if possible  For example if you have a strain of your lower leg muscle, lie down and prop your leg up on pillows  This helps decrease pain and swelling  · 3 to 21 days after the injury:  Start to slowly and regularly exercise your muscle  This will help it heal  If you feel pain, decrease how hard you are exercising  · 1 to 6 weeks after the injury:  Stretch the injured muscle  Hold the stretch for about 30 seconds   Do this 4 times a day  You may stretch the muscle until you feel a slight pull  Stop stretching if you feel pain  · 2 weeks to 6 months after the injury:  The goal of this phase is to return to the activity you were doing before the injury happened, without hurting the muscle again  · 3 weeks to 6 months after the injury:  Keep stretching and strengthening your muscles to avoid injury  Slowly increase the time and distance that you exercise  You may have signs and symptoms of muscle strain 6 months after the injury, even if you do things to help it heal  In this case, you may need surgery on the muscle  © 2017 [address]  Information is for End User's use only and may not be sold, redistributed or otherwise used for commercial purposes  All illustrations and images included in CareNotes® are the copyrighted property of [employer]  or [employer]  The above information is an  only  It is not intended as medical advice for individual conditions or treatments  Talk to your doctor, nurse or pharmacist before following any medical regimen to see if it is safe and effective for you  Proceed to ER if symptoms worsen  Chief Complaint     Chief Complaint   Patient presents with    Back Pain     mid and low back pain that goes down right leg, pt was swimming yesterday  Pt  states has back injury in 2006 so always has back pain  Having trouble sitting and walking  Took tylenol this morning  History of Present Illness     47 yo female here today with back pain  Patient has a history of chronic back pain and has been diagnosed with polyarthralgia and fibromyalgia and is currently under the care of a doctor  She states that she has an orthopedic appointment in the next few days but that for carpal tunnel and will be bringing upper back pain    She states she is on disability due to her back just had gastric bypass in July and is currently going to start physical therapy pool therapy in the upcoming days  She [place] feels that she may have over did it  She had some discomfort last night and took Tylenol with no relief  She denies taking anything else or trying any other pain relievers  She is unable to have any NSAIDs due to having recent gastric bypass  I discussed that she is limited on what medicine she can take but I can prescribe lidocaine patches and patient refused  She states that she has a lot of lidocaine patches at home from previous pain and tried 1 last night with no relief  Patient is standing up in exam room and unable to sit due to pain  I discussed with patient if symptoms continue to get worse to go to the ER however I can try a muscle relaxer to see if that would help  Patient denies taking any other medications in the past few months  Review of Systems   Review of Systems   HENT: Negative  Respiratory: Negative  Cardiovascular: Negative  Gastrointestinal: Negative  Musculoskeletal: Positive for arthralgias, back pain and gait problem  Skin: Negative  Psychiatric/Behavioral: Negative  Current Medications       Current Outpatient Medications:     amLODIPine (NORVASC) 10 mg tablet, Take 10 mg by mouth daily, Disp: , Rfl:     atenolol (TENORMIN) 25 mg tablet, TAKE 1 TABLET (25 MG TOTAL) BY MOUTH DAILY   CANCEL CLONIDINE, Disp: , Rfl: 0    pantoprazole (PROTONIX) 20 mg tablet, Take 20 mg by mouth daily, Disp: , Rfl:     pramipexole (MIRAPEX) 1 mg tablet, Take 0 25 mg by mouth 3 (three) times a day, Disp: , Rfl:     SUMAtriptan (IMITREX) 100 mg tablet, Take 100 mg by mouth once as needed for migraine, Disp: , Rfl:     buPROPion (WELLBUTRIN XL) 150 mg 24 hr tablet, Take 150 mg by mouth, Disp: , Rfl:     cloNIDine (CATAPRES) 0 2 mg tablet, Take 0 2 mg by mouth daily at bedtime, Disp: , Rfl:     Fesoterodine Fumarate ER 4 MG TB24, Take 1 tablet (4 mg total) by mouth daily at bedtime (Patient not taking: Reported on 8/22/2020), Disp: 30 tablet, Rfl: 1    gabapentin (NEURONTIN) 600 MG tablet, TAKE 1 Tablet BY MOUTH TWICE DAILY FOR PAIN AND SLEEP, Disp: , Rfl: 0    hydroxychloroquine (PLAQUENIL) 200 mg tablet, Take 400 mg by mouth daily, Disp: , Rfl: 5    hydrOXYzine HCL (ATARAX) 25 mg tablet, Take 25 mg by mouth every 6 (six) hours as needed for itching, Disp: , Rfl:     ketotifen (ZADITOR) 0 025 % ophthalmic solution, Administer 1 drop into the left eye 2 (two) times a day (Patient not taking: Reported on 8/22/2020), Disp: 5 mL, Rfl: 0    lovastatin (MEVACOR) 10 MG tablet, Take 10 mg by mouth, Disp: , Rfl:     meloxicam (MOBIC) 7 5 mg tablet, Take 7 5 mg by mouth, Disp: , Rfl:     metFORMIN (GLUCOPHAGE-XR) 500 mg 24 hr tablet, Take 1 tablet by mouth, Disp: , Rfl:     methocarbamol (ROBAXIN) 500 mg tablet, Take 1 tablet (500 mg total) by mouth 3 (three) times a day, Disp: 20 tablet, Rfl: 0    montelukast (SINGULAIR) 10 mg tablet, Take 10 mg by mouth daily at bedtime, Disp: , Rfl:     omeprazole (PriLOSEC) 10 mg delayed release capsule, Take 20 mg by mouth, Disp: , Rfl:     pramipexole (MIRAPEX) 1 mg tablet, Take 1 mg by mouth, Disp: , Rfl:     predniSONE 10 mg tablet, Take 6 pills day one, 5 pills day 2, 4 pills day 3, 3 pills day 4, 2 pills day 5, and 1 pill day 6   (Patient not taking: Reported on 8/22/2020), Disp: 21 tablet, Rfl: 0    venlafaxine (EFFEXOR-XR) 75 mg 24 hr capsule, Take 75 mg by mouth, Disp: , Rfl:     Current Allergies     Allergies as of 09/20/2020 - Reviewed 09/20/2020   Allergen Reaction Noted    Dog epithelium Swelling 11/14/2019    Ibuprofen Swelling 03/15/2018    Lisinopril Swelling 03/15/2018    Red dye Swelling 03/15/2018    Latex Rash 03/15/2018              Past Medical History:   Diagnosis Date    Angioedema     Anxiety     Arthritis     Diverticulitis     Hypertension     Migraine     Overactive bladder     Restless leg        Past Surgical History:   Procedure Laterality Date    ADENOIDECTOMY      BARIATRIC SURGERY      HYSTERECTOMY      TONSILLECTOMY         Family History   Problem Relation Age of Onset    Diabetes Mother     Hypertension Mother     Heart disease Father     Hypertension Father          Medications have been verified  The following portions of the patient's history were reviewed and updated as appropriate: allergies, current medications, past family history, past medical history, past social history, past surgical history and problem list     Objective   BP (!) 186/77   Pulse 74   Temp 98 2 °F (36 8 °C)   Resp 16   Ht 5' 2" (1 575 m)   Wt 98 9 kg (218 lb)   SpO2 96%   BMI 39 87 kg/m²      Physical Exam     Physical Exam  Constitutional:       Appearance: Normal appearance  She is not ill-appearing  Musculoskeletal:      Lumbar back: She exhibits decreased range of motion, tenderness, pain and spasm  She exhibits no swelling, no edema, no deformity and no laceration  Back:    Skin:     General: Skin is warm  Neurological:      Mental Status: She is alert and oriented to person, place, and time     Psychiatric:         Attention and Perception: Attention normal          Mood and Affect: Mood normal          ANYA Hull

## 2020-09-20 NOTE — PATIENT INSTRUCTIONS
Take meds as directed  Follow up with pcp, ortho  If worse go to the ER  Take tylenol, use lidocaine patches that you have at home  Muscle relaxer RX today      Back Pain   WHAT YOU NEED TO KNOW:   Back pain is common  It can be caused by many conditions, such as arthritis or the breakdown of spinal discs  Your risk for back pain is increased by injuries, lack of activity, or repeated bending and twisting  You may feel sore or stiff on one or both sides of your back  The pain may spread to your buttocks or thighs  DISCHARGE INSTRUCTIONS:   Medicines:   · NSAIDs  help decrease swelling and pain  This medicine is available with or without a doctor's order  NSAIDs can cause stomach bleeding or kidney problems in certain people  If you take blood thinner medicine, always ask your healthcare provider if NSAIDs are safe for you  Always read the medicine label and follow directions  · Acetaminophen  decreases pain  It is available without a doctor's order  Ask how much to take and how often to take it  Follow directions  Acetaminophen can cause liver damage if not taken correctly  · Prescription pain medicine  may be given  Ask your healthcare provider how to take this medicine safely  · Take your medicine as directed  Contact your healthcare provider if you think your medicine is not helping or if you have side effects  Tell him or her if you are allergic to any medicine  Keep a list of the medicines, vitamins, and herbs you take  Include the amounts, and when and why you take them  Bring the list or the pill bottles to follow-up visits  Carry your medicine list with you in case of an emergency  Follow up with your healthcare provider in 2 weeks, or as directed:  Write down your questions so you remember to ask them during your visits  How to manage your back pain:   · Apply ice  on your back or affected area for 15 to 20 minutes every hour or as directed  Use an ice pack, or put crushed ice in a plastic bag  Cover it with a towel  Ice helps prevent tissue damage and decreases pain  · Apply heat  on your back or affected area for 20 to 30 minutes every 2 hours for as many days as directed  Heat helps decrease pain and muscle spasms  · Stay active  as much as you can without causing more pain  Bed rest could make your back pain worse  Avoid heavy lifting until your pain is gone  Return to the emergency department if:   · You have pain, numbness, or weakness in one or both legs  · Your pain becomes so severe that you cannot walk  · You cannot control your urine or bowel movements  · You have severe back pain with chest pain  · You have severe back pain, nausea, and vomiting  · You have severe back pain that spreads to your side or genital area  Contact your healthcare provider if:   · You have back pain that does not get better with rest and pain medicine  · You have a fever  · You have pain that worsens when you are on your back or when you rest     · You have pain that worsens when you cough or sneeze  · You lose weight without trying  · You have questions or concerns about your condition or care  © 2017 [address] [facility] Information is for End User's use only and may not be sold, redistributed or otherwise used for commercial purposes  All illustrations and images included in CareNotes® are the copyrighted property of [employer]  or [employer]  The above information is an  only  It is not intended as medical advice for individual conditions or treatments  Talk to your doctor, nurse or pharmacist before following any medical regimen to see if it is safe and effective for you  Muscle Strain   WHAT YOU NEED TO KNOW:   A muscle strain is a twist, pull, or tear of a muscle or tendon  A tendon is a strong elastic tissue that connects a muscle to a bone   Signs of a strained muscle include bruising and swelling over the area, pain with movement, and loss of strength  DISCHARGE INSTRUCTIONS:   Return to the emergency department if:   · You suddenly cannot feel or move your injured muscle  Contact your healthcare provider if:   · Your pain and swelling worsen or do not go away  · You have questions or concerns about your condition or care  Medicines:   · NSAIDs  help decrease swelling and pain or fever  This medicine is available with or without a doctor's order  NSAIDs can cause stomach bleeding or kidney problems in certain people  If you take blood thinner medicine, always ask your healthcare provider if NSAIDs are safe for you  Always read the medicine label and follow directions  · Muscle relaxers  help decrease pain and muscle spasms  · Take your medicine as directed  Contact your healthcare provider if you think your medicine is not helping or if you have side effects  Tell him of her if you are allergic to any medicine  Keep a list of the medicines, vitamins, and herbs you take  Include the amounts, and when and why you take them  Bring the list or the pill bottles to follow-up visits  Carry your medicine list with you in case of an emergency  Follow up with your healthcare provider as directed: Your healthcare provider may suggest that you have a follow-up visit before you go back to your usual activity  Write down your questions so you remember to ask them during your visits  Self-care:   · 3 to 7 days after the injury:  Use Rest, Ice, Compression, and Elevation (RICE) to help stop bruising and decrease pain and swelling  ¨ Rest:  Rest your muscle to allow your injury to heal  When the pain decreases, begin normal, slow movements  For mild and moderate muscle strains, you should rest your muscles for about 2 days  However, if you have a severe muscle strain, you should rest for 10 to 14 days  You may need to use crutches to walk if your muscle strain is in your legs or lower body       ¨ Ice:  Put an ice pack on the injured area  Put a towel between the ice pack and your skin  Do not put the ice pack directly on your skin  You can use a package of frozen peas instead of an ice pack  ¨ Compression:  You may need to wrap an elastic bandage around the area to decrease swelling  It should be tight enough for you to feel support  Do not wrap it too tightly  ¨ Elevation:  Keep the injured muscle raised above your heart if possible  For example if you have a strain of your lower leg muscle, lie down and prop your leg up on pillows  This helps decrease pain and swelling  · 3 to 21 days after the injury:  Start to slowly and regularly exercise your muscle  This will help it heal  If you feel pain, decrease how hard you are exercising  · 1 to 6 weeks after the injury:  Stretch the injured muscle  Hold the stretch for about 30 seconds  Do this 4 times a day  You may stretch the muscle until you feel a slight pull  Stop stretching if you feel pain  · 2 weeks to 6 months after the injury:  The goal of this phase is to return to the activity you were doing before the injury happened, without hurting the muscle again  · 3 weeks to 6 months after the injury:  Keep stretching and strengthening your muscles to avoid injury  Slowly increase the time and distance that you exercise  You may have signs and symptoms of muscle strain 6 months after the injury, even if you do things to help it heal  In this case, you may need surgery on the muscle  © 2017 [employer] [employer] Information is for End User's use only and may not be sold, redistributed or otherwise used for commercial purposes  All illustrations and images included in CareNotes® are the copyrighted property of [employer]  or [employer]  The above information is an  only  It is not intended as medical advice for individual conditions or treatments   Talk to your doctor, nurse or pharmacist before following any medical regimen to see if it is safe and effective for you

## 2020-09-21 ENCOUNTER — OFFICE VISIT (OUTPATIENT)
Dept: URGENT CARE | Facility: CLINIC | Age: 50
End: 2020-09-21
Payer: COMMERCIAL

## 2020-09-21 VITALS
WEIGHT: 218 LBS | TEMPERATURE: 99.5 F | SYSTOLIC BLOOD PRESSURE: 142 MMHG | DIASTOLIC BLOOD PRESSURE: 80 MMHG | HEART RATE: 92 BPM | BODY MASS INDEX: 40.12 KG/M2 | HEIGHT: 62 IN | OXYGEN SATURATION: 97 % | RESPIRATION RATE: 18 BRPM

## 2020-09-21 DIAGNOSIS — B34.9 VIRAL SYNDROME: Primary | ICD-10-CM

## 2020-09-21 DIAGNOSIS — Z20.822 EXPOSURE TO COVID-19 VIRUS: ICD-10-CM

## 2020-09-21 LAB — S PYO AG THROAT QL: NEGATIVE

## 2020-09-21 PROCEDURE — 87880 STREP A ASSAY W/OPTIC: CPT | Performed by: PHYSICIAN ASSISTANT

## 2020-09-21 PROCEDURE — 99213 OFFICE O/P EST LOW 20 MIN: CPT | Performed by: PHYSICIAN ASSISTANT

## 2020-09-21 PROCEDURE — U0003 INFECTIOUS AGENT DETECTION BY NUCLEIC ACID (DNA OR RNA); SEVERE ACUTE RESPIRATORY SYNDROME CORONAVIRUS 2 (SARS-COV-2) (CORONAVIRUS DISEASE [COVID-19]), AMPLIFIED PROBE TECHNIQUE, MAKING USE OF HIGH THROUGHPUT TECHNOLOGIES AS DESCRIBED BY CMS-2020-01-R: HCPCS | Performed by: PHYSICIAN ASSISTANT

## 2020-09-21 PROCEDURE — 87070 CULTURE OTHR SPECIMN AEROBIC: CPT | Performed by: PHYSICIAN ASSISTANT

## 2020-09-21 NOTE — PATIENT INSTRUCTIONS
Covid 19 results will return in a 3-5 days  We will call you with both negative or positive results  Prophylactically self quarantine  Department of health's newest recommendations state patient should self quarantine for 10 days since symptom onset or 24 hours fever free without the use of fever reducing drugs (Tylenol and ibuprofen), whichever is longer AND overall improvement of symptoms  Drink lots of fluids to maintain hydration  Do not touch your face, wash hands often, and practice social distancing  Call your PCP if you have any questions or concerns  Go to ER if having severe symptoms such as chest pain, shortness of breath, or fever that is not responding to antipyretics  Novel Coronavirus   WHAT YOU NEED TO KNOW:   The nCoV is a new strain (type) of coronavirus that can cause severe respiratory (lung) infection  DISCHARGE INSTRUCTIONS:   Call your local emergency number (911 in the [address] [place] [address],[address]) for any of the following:  Tell the  you may have nCoV  This will help anyone in the ambulance stay safe, and to call ahead to the hospital   · You have sudden shortness of breath  · You have a fast heartbeat or chest pain  Return to the emergency department if:   · You feel so dizzy that you have trouble standing up  · Your lips and fingernails turn blue  Call your doctor if:   · You have a fever over 102ºF (39ºC)  · Your sore throat gets worse or you see white or yellow spots in your throat  · Your symptoms get worse after 3 to 5 days or your cold is not better in 14 days  · You have a rash anywhere on your skin  · You have large, tender lumps in your neck  · You have thick, green, or yellow drainage from your nose  · You cough up thick yellow, green, or bloody mucus  · You are vomiting for more than 24 hours and cannot keep fluids down  · You have a bad earache  · You have questions or concerns about your condition or care  Medicines:   You may need any of the following:  · Decongestants  help reduce nasal congestion and help you breathe more easily  If you take decongestant pills, they may make you feel restless or cause problems with your sleep  Do not use decongestant sprays for more than a few days  · Cough suppressants  help reduce coughing  Ask your healthcare provider which type of cough medicine is best for you  · NSAIDs , such as ibuprofen, help decrease swelling, pain, and fever  NSAIDs can cause stomach bleeding or kidney problems in certain people  If you take blood thinner medicine, always ask your healthcare provider if NSAIDs are safe for you  Always read the medicine label and follow directions  · Acetaminophen  decreases pain and fever  It is available without a doctor's order  Ask how much to take and how often to take it  Follow directions  Read the labels of all other medicines you are using to see if they also contain acetaminophen, or ask your doctor or pharmacist  Acetaminophen can cause liver damage if not taken correctly  Do not use more than 4 grams (4,000 milligrams) total of acetaminophen in one day  · Take your medicine as directed  Contact your healthcare provider if you think your medicine is not helping or if you have side effects  Tell him or her if you are allergic to any medicine  Keep a list of the medicines, vitamins, and herbs you take  Include the amounts, and when and why you take them  Bring the list or the pill bottles to follow-up visits  Carry your medicine list with you in case of an emergency  Help relieve mild symptoms:   · Drink more liquids as directed  Liquids will help thin and loosen mucus so you can cough it up  Liquids will also help prevent dehydration  Liquids that help prevent dehydration include water, fruit juice, and broth  Do not drink liquids that contain caffeine  Caffeine can increase your risk for dehydration  Ask your healthcare provider how much liquid to drink each day      · Soothe a sore throat  Gargle with warm salt water  This helps your sore throat feel better  Make salt water by dissolving ¼ teaspoon salt in 1 cup warm water  You may also suck on hard candy or throat lozenges  You may use a sore throat spray  · Use a humidifier or vaporizer  Use a cool mist humidifier or a vaporizer to increase air moisture in your home  This may make it easier for you to breathe and help decrease your cough  · Use saline nasal drops as directed  These help relieve congestion  · Apply petroleum-based jelly around the outside of your nostrils  This can decrease irritation from blowing your nose  · Do not smoke  Nicotine and other chemicals in cigarettes and cigars can make your symptoms worse  They can also cause infections such as bronchitis or pneumonia  Ask your healthcare provider for information if you currently smoke and need help to quit  E-cigarettes or smokeless tobacco still contain nicotine  Talk to your healthcare provider before you use these products  Prevent the spread of nCoV:  If you are around anyone who has nCoV, the following can help you protect you from infection  Have the person follow the guidelines to prevent infecting others:  · Limit close contact with others  Anyone with nCoV should stay in a different room, or sleep in a separate bed  Others need to stay at least 6 feet (2 meters) away  The person should only go out of the house for medical appointments  He or she should always call the office of any healthcare provider  The provider will need to prepare to keep others safe  · Wear a medical mask when around others  You can wear a medical mask or have the person wear one  A medical mask will help prevent nCoV from spreading  · Cover your mouth and nose to sneeze or cough  Everyone should always cover a sneeze or cough  Use a tissue that covers the mouth and nose  Use the bend of our arm if a tissue is not available   Throw the tissue away in a trash can right away  Then wash your hands well with soap and water  Use hand  that contains alcohol if you cannot wash your hands  · Wash your hands often  You and everyone in your home must wash your hands throughout the day  Use soap and water  Use germ-killing gel if soap and water are not available  A person with nCoV should not touch his or her eyes or mouth without washing his or her hands first          · Do not share items  Do not share dishes, towels, or other items with anyone  Always wash items after a person who has nCoV uses them  · Clean surfaces often  Use household  or bleach diluted with water to clean counters, doorknobs, toilet seats, and other surfaces  · Do not handle live animals  The nCoV may be spread to animals, including pets  Until more is known, it is best for a person with nCoV not to touch, play with, or handle live animals  Follow up with your doctor as directed:  Write down your questions so you remember to ask them during your visits  © Copyright [address] Information is for End User's use only and may not be sold, redistributed or otherwise used for commercial purposes  All illustrations and images included in CareNotes® are the copyrighted property of A D [employer] M [employer]  or [address] [place], [place] [address]  The above information is an  only  It is not intended as medical advice for individual conditions or treatments  Talk to your doctor, nurse or pharmacist before following any medical regimen to see if it is safe and effective for you

## 2020-09-21 NOTE — LETTER
September 21, 2020     Patient: Marnie Moses   YOB: 1970   Date of Visit: 9/21/2020       To Whom It May Concern: It is my medical opinion that Marnie Moses Should remain out of work for 10 days since symptom onset or 24 hours fever free without the use of fever reducing drugs, whichever is longer AND overall general improvement in symptoms OR 14 days since last exposure OR negative results  If you have any questions or concerns, please don't hesitate to call           Sincerely,        Jay Eddy PA-C

## 2020-09-21 NOTE — PROGRESS NOTES
[facility]        NAME: Keith Tovar is a 48 y o  female  : 1970    MRN: [id number]  DATE: 2020  TIME: 11:30 AM    Assessment and Plan   Viral syndrome [B34 9]  1  Viral syndrome  Novel Coronavirus (COVID-19), PCR [facility] - Office Collection    POCT rapid strepA    Throat culture   2  Exposure to COVID-19 virus         Patient evaluated and tested in COVID-19 designated room  Patient advised to quarantine regardless of results due to high risk exposure  Patient Instructions   Covid 19 results will return in a 3-5 days  We will call you with both negative or positive results  Prophylactically self quarantine  Department of health's newest recommendations state patient should self quarantine for 10 days since symptom onset or 24 hours fever free without the use of fever reducing drugs (Tylenol and ibuprofen), whichever is longer AND overall improvement of symptoms  Drink lots of fluids to maintain hydration  Do not touch your face, wash hands often, and practice social distancing  Call your PCP if you have any questions or concerns  Go to ER if having severe symptoms such as chest pain, shortness of breath, or fever that is not responding to antipyretics  Follow up with PCP in 3-5 days  Proceed to  ER if symptoms worsen  Chief Complaint     Chief Complaint   Patient presents with    COVID-19     headache, ear pain, throat pain, body aches x2 days         History of Present Illness       Patient is a 25-year-old female with significant past medical history of hypertension, diabetes, arthritis, fibromyalgia, restless leg and angioedema presents the office complaining of general myalgias, headache, bilateral otalgia, and sore throat for 2 days  Headache is rated 10/10 and is generalized  Patient states it feels like her typical migraines and denies any dizziness, lightheadedness, syncope, photophobia, phonophobia, or unilateral weakness    He denies fever, chills, cough, SOB, CP, difficulty breathing, anosmia, or dysgeusia  She has not taken anything for symptoms  Patient reports she was exposed to her brother who tested positive for COVID-19  Both people were not wearing a mask and exposure lasted greater than 15 minutes  Review of Systems   Review of Systems   Constitutional: Negative for chills and fever  HENT: Negative for congestion and sore throat  Respiratory: Negative for cough and shortness of breath  Cardiovascular: Negative for chest pain and palpitations  Gastrointestinal: Negative for abdominal pain, diarrhea, nausea and vomiting  Musculoskeletal: Negative for myalgias  Neurological: Negative for dizziness, light-headedness and headaches  Current Medications       Current Outpatient Medications:     amLODIPine (NORVASC) 10 mg tablet, Take 10 mg by mouth daily, Disp: , Rfl:     atenolol (TENORMIN) 25 mg tablet, TAKE 1 TABLET (25 MG TOTAL) BY MOUTH DAILY   CANCEL CLONIDINE, Disp: , Rfl: 0    cloNIDine (CATAPRES) 0 2 mg tablet, Take 0 2 mg by mouth daily at bedtime, Disp: , Rfl:     Fesoterodine Fumarate ER 4 MG TB24, Take 1 tablet (4 mg total) by mouth daily at bedtime, Disp: 30 tablet, Rfl: 1    gabapentin (NEURONTIN) 600 MG tablet, TAKE 1 Tablet BY MOUTH TWICE DAILY FOR PAIN AND SLEEP, Disp: , Rfl: 0    hydroxychloroquine (PLAQUENIL) 200 mg tablet, Take 400 mg by mouth daily, Disp: , Rfl: 5    hydrOXYzine HCL (ATARAX) 25 mg tablet, Take 25 mg by mouth every 6 (six) hours as needed for itching, Disp: , Rfl:     meloxicam (MOBIC) 7 5 mg tablet, Take 7 5 mg by mouth, Disp: , Rfl:     metFORMIN (GLUCOPHAGE-XR) 500 mg 24 hr tablet, Take 1 tablet by mouth, Disp: , Rfl:     methocarbamol (ROBAXIN) 500 mg tablet, Take 1 tablet (500 mg total) by mouth 3 (three) times a day, Disp: 20 tablet, Rfl: 0    montelukast (SINGULAIR) 10 mg tablet, Take 10 mg by mouth daily at bedtime, Disp: , Rfl:     omeprazole (PriLOSEC) 10 mg delayed release capsule, Take 20 mg by mouth, Disp: , Rfl:     pantoprazole (PROTONIX) 20 mg tablet, Take 20 mg by mouth daily, Disp: , Rfl:     pramipexole (MIRAPEX) 1 mg tablet, Take 0 25 mg by mouth 3 (three) times a day, Disp: , Rfl:     pramipexole (MIRAPEX) 1 mg tablet, Take 1 mg by mouth, Disp: , Rfl:     SUMAtriptan (IMITREX) 100 mg tablet, Take 100 mg by mouth once as needed for migraine, Disp: , Rfl:     venlafaxine (EFFEXOR-XR) 75 mg 24 hr capsule, Take 75 mg by mouth, Disp: , Rfl:     buPROPion (WELLBUTRIN XL) 150 mg 24 hr tablet, Take 150 mg by mouth, Disp: , Rfl:     ketotifen (ZADITOR) 0 025 % ophthalmic solution, Administer 1 drop into the left eye 2 (two) times a day (Patient not taking: Reported on 9/21/2020), Disp: 5 mL, Rfl: 0    lovastatin (MEVACOR) 10 MG tablet, Take 10 mg by mouth, Disp: , Rfl:     predniSONE 10 mg tablet, Take 6 pills day one, 5 pills day 2, 4 pills day 3, 3 pills day 4, 2 pills day 5, and 1 pill day 6   (Patient not taking: Reported on 8/22/2020), Disp: 21 tablet, Rfl: 0    Current Allergies     Allergies as of 09/21/2020 - Reviewed 09/21/2020   Allergen Reaction Noted    Dog epithelium Swelling 11/14/2019    Ibuprofen Swelling 03/15/2018    Lisinopril Swelling 03/15/2018    Red dye Swelling 03/15/2018    Latex Rash 03/15/2018            The following portions of the patient's history were reviewed and updated as appropriate: allergies, current medications, past family history, past medical history, past social history, past surgical history and problem list      Past Medical History:   Diagnosis Date    Angioedema     Anxiety     Arthritis     Diverticulitis     Hypertension     Migraine     Overactive bladder     Restless leg        Past Surgical History:   Procedure Laterality Date    ADENOIDECTOMY      BARIATRIC SURGERY      HYSTERECTOMY      TONSILLECTOMY         Family History   Problem Relation Age of Onset    Diabetes Mother     Hypertension Mother  Heart disease Father     Hypertension Father          Medications have been verified  Objective   /80   Pulse 92   Temp 99 5 °F (37 5 °C)   Resp 18   Ht 5' 2" (1 575 m)   Wt 98 9 kg (218 lb)   SpO2 97%   BMI 39 87 kg/m²        Physical Exam     Physical Exam  Vitals signs and nursing note reviewed  Constitutional:       Appearance: Normal appearance  She is well-developed  HENT:      Head: Normocephalic and atraumatic  Right Ear: Tympanic membrane, ear canal and external ear normal       Left Ear: Tympanic membrane, ear canal and external ear normal       Nose: Nose normal  No congestion or rhinorrhea  Mouth/Throat:      Lips: Pink  Mouth: Mucous membranes are moist       Pharynx: Uvula midline  No pharyngeal swelling, oropharyngeal exudate, posterior oropharyngeal erythema or uvula swelling  Tonsils: No tonsillar exudate or tonsillar abscesses  Eyes:      General: Lids are normal       Extraocular Movements: Extraocular movements intact  Conjunctiva/sclera: Conjunctivae normal       Pupils: Pupils are equal, round, and reactive to light  Neck:      Musculoskeletal: Neck supple  Cardiovascular:      Rate and Rhythm: Normal rate and regular rhythm  Pulses: Normal pulses  Heart sounds: Normal heart sounds  No murmur  No friction rub  No gallop  Pulmonary:      Effort: Pulmonary effort is normal       Breath sounds: Normal breath sounds  No wheezing, rhonchi or rales  Chest:      Chest wall: No tenderness  Abdominal:      General: Bowel sounds are normal       Palpations: Abdomen is soft  Tenderness: There is no abdominal tenderness  Musculoskeletal: Normal range of motion  Lymphadenopathy:      Cervical: No cervical adenopathy  Skin:     General: Skin is warm and dry  Capillary Refill: Capillary refill takes less than 2 seconds  Neurological:      General: No focal deficit present  Mental Status: She is alert  Cranial Nerves: Cranial nerves are intact  Sensory: Sensation is intact  Motor: Motor function is intact  Coordination: Coordination is intact  Rapid strep negative

## 2020-09-23 LAB — SARS-COV-2 RNA SPEC QL NAA+PROBE: NOT DETECTED

## 2020-09-24 ENCOUNTER — TELEPHONE (OUTPATIENT)
Dept: URGENT CARE | Facility: CLINIC | Age: 50
End: 2020-09-24

## 2020-09-24 LAB — BACTERIA THROAT CULT: NORMAL

## 2021-02-19 ENCOUNTER — OFFICE VISIT (OUTPATIENT)
Dept: URGENT CARE | Facility: CLINIC | Age: 51
End: 2021-02-19
Payer: COMMERCIAL

## 2021-02-19 VITALS
HEIGHT: 62 IN | OXYGEN SATURATION: 98 % | BODY MASS INDEX: 40.12 KG/M2 | RESPIRATION RATE: 16 BRPM | TEMPERATURE: 98 F | WEIGHT: 218 LBS | HEART RATE: 87 BPM

## 2021-02-19 DIAGNOSIS — Z20.822 ENCOUNTER FOR LABORATORY TESTING FOR COVID-19 VIRUS: ICD-10-CM

## 2021-02-19 DIAGNOSIS — R68.89 FLU-LIKE SYMPTOMS: Primary | ICD-10-CM

## 2021-02-19 PROCEDURE — 99212 OFFICE O/P EST SF 10 MIN: CPT | Performed by: EMERGENCY MEDICINE

## 2021-02-19 PROCEDURE — U0005 INFEC AGEN DETEC AMPLI PROBE: HCPCS | Performed by: EMERGENCY MEDICINE

## 2021-02-19 PROCEDURE — U0003 INFECTIOUS AGENT DETECTION BY NUCLEIC ACID (DNA OR RNA); SEVERE ACUTE RESPIRATORY SYNDROME CORONAVIRUS 2 (SARS-COV-2) (CORONAVIRUS DISEASE [COVID-19]), AMPLIFIED PROBE TECHNIQUE, MAKING USE OF HIGH THROUGHPUT TECHNOLOGIES AS DESCRIBED BY CMS-2020-01-R: HCPCS | Performed by: EMERGENCY MEDICINE

## 2021-02-19 RX ORDER — ALBUTEROL SULFATE 90 UG/1
2 AEROSOL, METERED RESPIRATORY (INHALATION) EVERY 6 HOURS PRN
Qty: 8.5 G | Refills: 0 | Status: SHIPPED | OUTPATIENT
Start: 2021-02-19

## 2021-02-19 NOTE — PROGRESS NOTES
[address]        NAME: Evelio Almaraz is a 48 y o  female  : 1970    MRN: [id number]  DATE: 2021  TIME: 11:19 AM    Assessment and Plan   Flu-like symptoms [R68 89]  1  Flu-like symptoms  Novel Coronavirus (Covid-19),PCR [facility] - Office Collection    albuterol (ProAir HFA) 90 mcg/act inhaler   2  Encounter for laboratory testing for COVID-19 virus       Peak flows are 300, 360, 360    Patient Instructions     Patient Instructions     You have been diagnosed with a flu-like illness, and your symptoms should resolve over the next 7 to 10 days with the treatments recommended today  If they do not, it is possible that you have developed a bacterial infection and you should return  If you were to take an antibiotic while you are still in the viral stage, you will not get better any faster, but could kill off  good germs in your body as well as make germs  resistant to the antibiotic  Take an expectorant - guaifenesin should be the only ingredient - during the day, and the cough suppressant (ex  Robitussin DM or Tessalon) if needed at night only  Take Zinc 12 5 to 15 mg every 2 - 3 hrs while awake for the next few days  You may take Cold Donis (13 3 mg of Zinc) or split a 25 mg Zinc tablet or lozenge in two or a 50 mg into four to get the proper dose  The total daily dose of Zinc should exceed 75 mg per day  You may also take vitamin D 3 2000 i u s per day for the next 1 week  You may also take a decongestant like Sudafed, unless you have hypertension or cardiac disease  You may take Imodium for diarrhea according to package instructions  Flu-like illness   AMBULATORY CARE:   Flu-like illness is an infection caused by a virus  The flu is easily spread when an infected person coughs, sneezes, or has close contact with others  You may be able to spread the flu to others for 1 week or longer after signs or symptoms appear     Common signs and symptoms include the following: · Fever and chills    · Headaches, body aches, and muscle or joint pain    · Cough, runny nose, and sore throat    · Loss of appetite, nausea, vomiting, or diarrhea    · Tiredness    · Trouble breathing  Call 911 for any of the following:   · You have trouble breathing, and your lips look purple or blue  · You have a seizure  Seek care immediately if:   · You are dizzy, or you are urinating less or not at all  · You have a headache with a stiff neck, and you feel tired or confused  · You have new pain or pressure in your chest     · Your symptoms, such as shortness of breath, vomiting, or diarrhea, get worse  · Your symptoms, such as fever and coughing, seem to get better, but then get worse  Contact your healthcare provider if:   · You have new muscle pain or weakness  · You have questions or concerns about your condition or care  Treatment for influenza  may include any of the following:  · Acetaminophen  decreases pain and fever  It is available without a doctor's order  Ask how much to take and how often to take it  Follow directions  Acetaminophen can cause liver damage if not taken correctly  · NSAIDs , such as ibuprofen, help decrease swelling, pain, and fever  This medicine is available with or without a doctor's order  NSAIDs can cause stomach bleeding or kidney problems in certain people  If you take blood thinner medicine, always ask your healthcare provider if NSAIDs are safe for you  Always read the medicine label and follow directions  · Antivirals  help fight a viral infection  Manage your symptoms:   · Rest  as much as you can to help you recover  · Drink liquids as directed  to help prevent dehydration  Ask how much liquid to drink each day and which liquids are best for you  Prevent the spread of the flu:   · Wash your hands often  Use soap and water  Wash your hands after you use the bathroom, change a child's diapers, or sneeze   Wash your hands before you prepare or eat food  Use gel hand cleanser when soap and water are not available  Do not touch your eyes, nose, or mouth unless you have washed your hands first        · Cover your mouth when you sneeze or cough  Cough into a tissue or the bend of your arm  · Clean shared items with a germ-killing   Clean table surfaces, doorknobs, and light switches  Do not share towels, silverware, and dishes with people who are sick  Wash bed sheets, towels, silverware, and dishes with soap and water  · Wear a mask  over your mouth and nose if you are sick or are near anyone who is sick  · Stay away from others  if you are sick  · Influenza vaccine  helps prevent influenza (flu)  Everyone older than 6 months should get a yearly influenza vaccine  Get the vaccine as soon as it is available, usually in September or October each year  Follow up with your healthcare provider as directed:  Write down your questions so you remember to ask them during your visits  © 2017 [address] Information is for End User's use only and may not be sold, redistributed or otherwise used for commercial purposes  All illustrations and images included in CareNotes® are the copyrighted property of [employer]  or [employer]  The above information is an  only  It is not intended as medical advice for individual conditions or treatments  Talk to your doctor, nurse or pharmacist before following any medical regimen to see if it is safe and effective for you  [address]     Your healthcare provider and/or public health staff have evaluated you and have determined that you do not need to be hospitalized at this time  At this time you can be isolated at home where you will be monitored by staff from your local or state health department   You should carefully follow the prevention and isolation steps below until a healthcare provider or local or state health department says that you can return to your normal activities  Stay home except to get medical care     People who are mildly ill with COVID-19 are able to isolate at home during their illness  You should restrict activities outside your home, except for getting medical care  Do not go to work, school, or public areas  Avoid using public transportation, ride-sharing, or taxis  Separate yourself from other people and animals in your home     People: As much as possible, you should stay in a specific room and away from other people in your home  Also, you should use a separate bathroom, if available  Animals: You should restrict contact with pets and other animals while you are sick with COVID-19, just like you would around other people  Although there have not been reports of pets or other animals becoming sick with COVID-19, it is still recommended that people sick with COVID-19 limit contact with animals until more information is known about the virus  When possible, have another member of your household care for your animals while you are sick  If you are sick with COVID-19, avoid contact with your pet, including petting, snuggling, being kissed or licked, and sharing food  If you must care for your pet or be around animals while you are sick, wash your hands before and after you interact with pets and wear a facemask  See COVID-19 and Animals for more information  Call ahead before visiting your doctor     If you have a medical appointment, call the healthcare provider and tell them that you have or may have COVID-19  This will help the healthcare providers office take steps to keep other people from getting infected or exposed  Wear a facemask     You should wear a facemask when you are around other people (e g , sharing a room or vehicle) or pets and before you enter a healthcare providers office   If you are not able to wear a facemask (for example, because it causes trouble breathing), then people who live with you should not stay in the same room with you, or they should wear a facemask if they enter your room  Cover your coughs and sneezes     Cover your mouth and nose with a tissue when you cough or sneeze  Throw used tissues in a lined trash can  Immediately wash your hands with soap and water for at least 20 seconds or, if soap and water are not available, clean your hands with an alcohol-based hand  that contains at least 60% alcohol  Clean your hands often     Wash your hands often with soap and water for at least 20 seconds, especially after blowing your nose, coughing, or sneezing; going to the bathroom; and before eating or preparing food  If soap and water are not readily available, use an alcohol-based hand  with at least 60% alcohol, covering all surfaces of your hands and rubbing them together until they feel dry  Soap and water are the best option if hands are visibly dirty  Avoid touching your eyes, nose, and mouth with unwashed hands  Avoid sharing personal household items     You should not share dishes, drinking glasses, cups, eating utensils, towels, or bedding with other people or pets in your home  After using these items, they should be washed thoroughly with soap and water  Clean all high-touch surfaces everyday     High touch surfaces include counters, tabletops, doorknobs, bathroom fixtures, toilets, phones, keyboards, tablets, and bedside tables  Also, clean any surfaces that may have blood, stool, or body fluids on them  Use a household cleaning spray or wipe, according to the label instructions  Labels contain instructions for safe and effective use of the cleaning product including precautions you should take when applying the product, such as wearing gloves and making sure you have good ventilation during use of the product       Monitor your symptoms     Seek prompt medical attention if your illness is worsening (e g , difficulty breathing)  Before seeking care, call your healthcare provider and tell them that you have, or are being evaluated for, COVID-19  Put on a facemask before you enter the facility  These steps will help the healthcare providers office to keep other people in the office or waiting room from getting infected or exposed  Ask your healthcare provider to call the local or state health department  Persons who are placed under active monitoring or facilitated self-monitoring should follow instructions provided by their local health department or occupational health professionals, as appropriate  If you have a medical emergency and need to call 911, notify the dispatch personnel that you have, or are being evaluated for COVID-19  If possible, put on a facemask before emergency medical services arrive  Discontinuing home isolation     Patients with confirmed COVID-19 should remain under home isolation precautions until the risk of secondary transmission to others is thought to be low  The decision to discontinue home isolation precautions should be made on a case-by-case basis, in consultation with healthcare providers and [facility] and [facility] health departments  Source: [employer]        Proceed to ER if symptoms worsen          Follow up with PCP in 3-5 days  Proceed to  ER if symptoms worsen  Chief Complaint     Chief Complaint   Patient presents with    COVID-19     h/a,body aches and pressure in lungs  Both parents are positive         History of Present Illness       Patient complains of headaches, generalized aches, chest tightness fatigue for the past 2 days, onset after COVID exposure  Headaches are dull, bifrontal and not the worst headache of her life  Review of Systems   Review of Systems   Constitutional: Negative for chills and fever  HENT: Positive for congestion, rhinorrhea and sinus pressure   Negative for sore throat, trouble swallowing and voice change  Respiratory: Positive for cough and chest tightness  Negative for shortness of breath and wheezing  Cardiovascular: Negative for chest pain  Neurological: Positive for headaches  Current Medications       Current Outpatient Medications:     amLODIPine (NORVASC) 10 mg tablet, Take 10 mg by mouth daily, Disp: , Rfl:     atenolol (TENORMIN) 25 mg tablet, TAKE 1 TABLET (25 MG TOTAL) BY MOUTH DAILY   CANCEL CLONIDINE, Disp: , Rfl: 0    hydroxychloroquine (PLAQUENIL) 200 mg tablet, Take 400 mg by mouth daily, Disp: , Rfl: 5    lovastatin (MEVACOR) 10 MG tablet, Take 10 mg by mouth, Disp: , Rfl:     pantoprazole (PROTONIX) 20 mg tablet, Take 20 mg by mouth daily, Disp: , Rfl:     SUMAtriptan (IMITREX) 100 mg tablet, Take 100 mg by mouth once as needed for migraine, Disp: , Rfl:     venlafaxine (EFFEXOR-XR) 75 mg 24 hr capsule, Take 75 mg by mouth, Disp: , Rfl:     albuterol (ProAir HFA) 90 mcg/act inhaler, Inhale 2 puffs every 6 (six) hours as needed for shortness of breath Dispense 1 spacer, Disp: 8 5 g, Rfl: 0    buPROPion (WELLBUTRIN XL) 150 mg 24 hr tablet, Take 150 mg by mouth, Disp: , Rfl:     cloNIDine (CATAPRES) 0 2 mg tablet, Take 0 2 mg by mouth daily at bedtime, Disp: , Rfl:     Fesoterodine Fumarate ER 4 MG TB24, Take 1 tablet (4 mg total) by mouth daily at bedtime, Disp: 30 tablet, Rfl: 1    gabapentin (NEURONTIN) 600 MG tablet, TAKE 1 Tablet BY MOUTH TWICE DAILY FOR PAIN AND SLEEP, Disp: , Rfl: 0    hydrOXYzine HCL (ATARAX) 25 mg tablet, Take 25 mg by mouth every 6 (six) hours as needed for itching, Disp: , Rfl:     ketotifen (ZADITOR) 0 025 % ophthalmic solution, Administer 1 drop into the left eye 2 (two) times a day (Patient not taking: Reported on 9/21/2020), Disp: 5 mL, Rfl: 0    meloxicam (MOBIC) 7 5 mg tablet, Take 7 5 mg by mouth, Disp: , Rfl:     metFORMIN (GLUCOPHAGE-XR) 500 mg 24 hr tablet, Take 1 tablet by mouth, Disp: , Rfl:    methocarbamol (ROBAXIN) 500 mg tablet, Take 1 tablet (500 mg total) by mouth 3 (three) times a day, Disp: 20 tablet, Rfl: 0    montelukast (SINGULAIR) 10 mg tablet, Take 10 mg by mouth daily at bedtime, Disp: , Rfl:     omeprazole (PriLOSEC) 10 mg delayed release capsule, Take 20 mg by mouth, Disp: , Rfl:     pramipexole (MIRAPEX) 1 mg tablet, Take 0 25 mg by mouth 3 (three) times a day, Disp: , Rfl:     pramipexole (MIRAPEX) 1 mg tablet, Take 1 mg by mouth, Disp: , Rfl:     predniSONE 10 mg tablet, Take 6 pills day one, 5 pills day 2, 4 pills day 3, 3 pills day 4, 2 pills day 5, and 1 pill day 6  (Patient not taking: Reported on 8/22/2020), Disp: 21 tablet, Rfl: 0    Current Allergies     Allergies as of 02/19/2021 - Reviewed 02/19/2021   Allergen Reaction Noted    Dog epithelium Swelling 11/14/2019    Ibuprofen Swelling 03/15/2018    Lisinopril Swelling 03/15/2018    Red dye Swelling 03/15/2018    Latex Rash 03/15/2018            The following portions of the patient's history were reviewed and updated as appropriate: allergies, current medications, past family history, past medical history, past social history, past surgical history and problem list      Past Medical History:   Diagnosis Date    Angioedema     Anxiety     Arthritis     Diverticulitis     Hypertension     Migraine     Overactive bladder     Restless leg        Past Surgical History:   Procedure Laterality Date    ADENOIDECTOMY      BARIATRIC SURGERY      HYSTERECTOMY      TONSILLECTOMY         Family History   Problem Relation Age of Onset    Diabetes Mother     Hypertension Mother     Heart disease Father     Hypertension Father          Medications have been verified  Objective   Pulse 87   Temp 98 °F (36 7 °C)   Resp 16   Ht 5' 2" (1 575 m)   Wt 98 9 kg (218 lb)   SpO2 98%   BMI 39 87 kg/m²        Physical Exam     Physical Exam  Vitals signs and nursing note reviewed     Constitutional:       General: She is not in acute distress  Appearance: She is well-developed  HENT:      Head: Normocephalic and atraumatic  Nose: Mucosal edema present  Mouth/Throat:      Pharynx: Posterior oropharyngeal erythema present  No oropharyngeal exudate  Tonsils: No tonsillar abscesses  Neck:      Musculoskeletal: Neck supple  Cardiovascular:      Rate and Rhythm: Normal rate and regular rhythm  Pulmonary:      Effort: Pulmonary effort is normal  No respiratory distress  Breath sounds: No wheezing or rales  Skin:     General: Skin is warm and dry  Neurological:      Mental Status: She is alert and oriented to person, place, and time  Psychiatric:         Mood and Affect: Mood normal          Behavior: Behavior normal          Thought Content:  Thought content normal          Judgment: Judgment normal

## 2021-02-19 NOTE — PATIENT INSTRUCTIONS
You have been diagnosed with a flu-like illness, and your symptoms should resolve over the next 7 to 10 days with the treatments recommended today  If they do not, it is possible that you have developed a bacterial infection and you should return  If you were to take an antibiotic while you are still in the viral stage, you will not get better any faster, but could kill off  good germs in your body as well as make germs  resistant to the antibiotic  Take an expectorant - guaifenesin should be the only ingredient - during the day, and the cough suppressant (ex  Robitussin DM or Tessalon) if needed at night only  Take Zinc 12 5 to 15 mg every 2 - 3 hrs while awake for the next few days  You may take Cold Donis (13 3 mg of Zinc) or split a 25 mg Zinc tablet or lozenge in two or a 50 mg into four to get the proper dose  The total daily dose of Zinc should exceed 75 mg per day  You may also take vitamin D 3 2000 i u s per day for the next 1 week  You may also take a decongestant like Sudafed, unless you have hypertension or cardiac disease  You may take Imodium for diarrhea according to package instructions  Flu-like illness   AMBULATORY CARE:   Flu-like illness is an infection caused by a virus  The flu is easily spread when an infected person coughs, sneezes, or has close contact with others  You may be able to spread the flu to others for 1 week or longer after signs or symptoms appear  Common signs and symptoms include the following:   · Fever and chills    · Headaches, body aches, and muscle or joint pain    · Cough, runny nose, and sore throat    · Loss of appetite, nausea, vomiting, or diarrhea    · Tiredness    · Trouble breathing  Call 911 for any of the following:   · You have trouble breathing, and your lips look purple or blue  · You have a seizure  Seek care immediately if:   · You are dizzy, or you are urinating less or not at all       · You have a headache with a stiff neck, and you feel tired or confused  · You have new pain or pressure in your chest     · Your symptoms, such as shortness of breath, vomiting, or diarrhea, get worse  · Your symptoms, such as fever and coughing, seem to get better, but then get worse  Contact your healthcare provider if:   · You have new muscle pain or weakness  · You have questions or concerns about your condition or care  Treatment for influenza  may include any of the following:  · Acetaminophen  decreases pain and fever  It is available without a doctor's order  Ask how much to take and how often to take it  Follow directions  Acetaminophen can cause liver damage if not taken correctly  · NSAIDs , such as ibuprofen, help decrease swelling, pain, and fever  This medicine is available with or without a doctor's order  NSAIDs can cause stomach bleeding or kidney problems in certain people  If you take blood thinner medicine, always ask your healthcare provider if NSAIDs are safe for you  Always read the medicine label and follow directions  · Antivirals  help fight a viral infection  Manage your symptoms:   · Rest  as much as you can to help you recover  · Drink liquids as directed  to help prevent dehydration  Ask how much liquid to drink each day and which liquids are best for you  Prevent the spread of the flu:   · Wash your hands often  Use soap and water  Wash your hands after you use the bathroom, change a child's diapers, or sneeze  Wash your hands before you prepare or eat food  Use gel hand cleanser when soap and water are not available  Do not touch your eyes, nose, or mouth unless you have washed your hands first        · Cover your mouth when you sneeze or cough  Cough into a tissue or the bend of your arm  · Clean shared items with a germ-killing   Clean table surfaces, doorknobs, and light switches  Do not share towels, silverware, and dishes with people who are sick   Wash bed sheets, towels, silverware, and dishes with soap and water  · Wear a mask  over your mouth and nose if you are sick or are near anyone who is sick  · Stay away from others  if you are sick  · Influenza vaccine  helps prevent influenza (flu)  Everyone older than 6 months should get a yearly influenza vaccine  Get the vaccine as soon as it is available, usually in September or October each year  Follow up with your healthcare provider as directed:  Write down your questions so you remember to ask them during your visits  © 2017 [address] Information is for End User's use only and may not be sold, redistributed or otherwise used for commercial purposes  All illustrations and images included in CareNotes® are the copyrighted property of [employer]  or [employer]  The above information is an  only  It is not intended as medical advice for individual conditions or treatments  Talk to your doctor, nurse or pharmacist before following any medical regimen to see if it is safe and effective for you  [address]     Your healthcare provider and/or public health staff have evaluated you and have determined that you do not need to be hospitalized at this time  At this time you can be isolated at home where you will be monitored by staff from your local or state health department  You should carefully follow the prevention and isolation steps below until a healthcare provider or local or state health department says that you can return to your normal activities  Stay home except to get medical care     People who are mildly ill with COVID-19 are able to isolate at home during their illness  You should restrict activities outside your home, except for getting medical care  Do not go to work, school, or public areas  Avoid using public transportation, ride-sharing, or taxis       Separate yourself from other people and animals in your home     People: As much as possible, you should stay in a specific room and away from other people in your home  Also, you should use a separate bathroom, if available  Animals: You should restrict contact with pets and other animals while you are sick with COVID-19, just like you would around other people  Although there have not been reports of pets or other animals becoming sick with COVID-19, it is still recommended that people sick with COVID-19 limit contact with animals until more information is known about the virus  When possible, have another member of your household care for your animals while you are sick  If you are sick with COVID-19, avoid contact with your pet, including petting, snuggling, being kissed or licked, and sharing food  If you must care for your pet or be around animals while you are sick, wash your hands before and after you interact with pets and wear a facemask  See COVID-19 and Animals for more information  Call ahead before visiting your doctor     If you have a medical appointment, call the healthcare provider and tell them that you have or may have COVID-19  This will help the healthcare providers office take steps to keep other people from getting infected or exposed  Wear a facemask     You should wear a facemask when you are around other people (e g , sharing a room or vehicle) or pets and before you enter a healthcare providers office  If you are not able to wear a facemask (for example, because it causes trouble breathing), then people who live with you should not stay in the same room with you, or they should wear a facemask if they enter your room  Cover your coughs and sneezes     Cover your mouth and nose with a tissue when you cough or sneeze  Throw used tissues in a lined trash can   Immediately wash your hands with soap and water for at least 20 seconds or, if soap and water are not available, clean your hands with an alcohol-based hand  that contains at least 60% alcohol  Clean your hands often     Wash your hands often with soap and water for at least 20 seconds, especially after blowing your nose, coughing, or sneezing; going to the bathroom; and before eating or preparing food  If soap and water are not readily available, use an alcohol-based hand  with at least 60% alcohol, covering all surfaces of your hands and rubbing them together until they feel dry  Soap and water are the best option if hands are visibly dirty  Avoid touching your eyes, nose, and mouth with unwashed hands  Avoid sharing personal household items     You should not share dishes, drinking glasses, cups, eating utensils, towels, or bedding with other people or pets in your home  After using these items, they should be washed thoroughly with soap and water  Clean all high-touch surfaces everyday     High touch surfaces include counters, tabletops, doorknobs, bathroom fixtures, toilets, phones, keyboards, tablets, and bedside tables  Also, clean any surfaces that may have blood, stool, or body fluids on them  Use a household cleaning spray or wipe, according to the label instructions  Labels contain instructions for safe and effective use of the cleaning product including precautions you should take when applying the product, such as wearing gloves and making sure you have good ventilation during use of the product  Monitor your symptoms     Seek prompt medical attention if your illness is worsening (e g , difficulty breathing)  Before seeking care, call your healthcare provider and tell them that you have, or are being evaluated for, COVID-19  Put on a facemask before you enter the facility  These steps will help the healthcare providers office to keep other people in the office or waiting room from getting infected or exposed  Ask your healthcare provider to call the local or state health department   Persons who are placed under active monitoring or facilitated self-monitoring should follow instructions provided by their local health department or occupational health professionals, as appropriate  If you have a medical emergency and need to call 911, notify the dispatch personnel that you have, or are being evaluated for COVID-19  If possible, put on a facemask before emergency medical services arrive  Discontinuing home isolation     Patients with confirmed COVID-19 should remain under home isolation precautions until the risk of secondary transmission to others is thought to be low  The decision to discontinue home isolation precautions should be made on a case-by-case basis, in consultation with healthcare providers and state and local health departments       Source: [employer]        Proceed to ER if symptoms worsen

## 2021-02-21 LAB — SARS-COV-2 N GENE RESP QL NAA+PROBE: NEGATIVE

## 2021-02-24 ENCOUNTER — OFFICE VISIT (OUTPATIENT)
Dept: URGENT CARE | Facility: CLINIC | Age: 51
End: 2021-02-24
Payer: COMMERCIAL

## 2021-02-24 VITALS
HEART RATE: 94 BPM | HEIGHT: 62 IN | WEIGHT: 218 LBS | BODY MASS INDEX: 40.12 KG/M2 | OXYGEN SATURATION: 96 % | RESPIRATION RATE: 16 BRPM | TEMPERATURE: 98 F

## 2021-02-24 DIAGNOSIS — R68.89 FLU-LIKE SYMPTOMS: Primary | ICD-10-CM

## 2021-02-24 PROCEDURE — 99213 OFFICE O/P EST LOW 20 MIN: CPT | Performed by: EMERGENCY MEDICINE

## 2021-02-24 RX ORDER — PREDNISONE 10 MG/1
TABLET ORAL
Qty: 27 TABLET | Refills: 0 | Status: SHIPPED | OUTPATIENT
Start: 2021-02-24

## 2021-02-24 NOTE — PROGRESS NOTES
[facility]        NAME: Ramon Hester is a 48 y o  female  : 1970    MRN: [id number]  DATE: 2021  TIME: 3:30 PM    Assessment and Plan   Flu-like symptoms [R68 89]  1  Flu-like symptoms  predniSONE 10 mg tablet         Patient Instructions     Patient Instructions     You have been diagnosed with a flu-like illness, and your symptoms should resolve over the next 7 to 10 days with the treatments recommended today  If they do not, it is possible that you have developed a bacterial infection and you should return  If you were to take an antibiotic while you are still in the viral stage, you will not get better any faster, but could kill off the good germs in your body as well as make the germs in you resistant to the antibiotic  Take an expectorant - guaifenesin should be the only ingredient - during the day, and the cough suppressant (ex  Robitussin DM or Tessalon) if needed at night only  Take Zinc 12 5 to 15 mg every 2 - 3 hrs while awake for the next few days  You may take Cold Donis (13 3 mg of Zinc) or split a 25 mg Zinc tablet or lozenge in two or a 50 mg into four to get the proper dose  The total daily dose of Zinc should exceed 75 mg per day  You may also take a decongestant like Sudafed, unless you have hypertension or cardiac disease  Hold any NSAIDs like Ibuprofen (Advil), Naprosyn (Aleve), etc while on steroids like Medrol or Prednisone  If you are diabetic, you should also adhere strictly to your diet and monitor your blood sugar closely while on the steroids as discussed  Flu-like illness   AMBULATORY CARE:   Flu-like illness is an infection caused by a virus  The flu is easily spread when an infected person coughs, sneezes, or has close contact with others  You may be able to spread the flu to others for 1 week or longer after signs or symptoms appear     Common signs and symptoms include the following:   · Fever and chills    · Headaches, body aches, and muscle or joint pain    · Cough, runny nose, and sore throat    · Loss of appetite, nausea, vomiting, or diarrhea    · Tiredness    · Trouble breathing  Call 911 for any of the following:   · You have trouble breathing, and your lips look purple or blue  · You have a seizure  Seek care immediately if:   · You are dizzy, or you are urinating less or not at all  · You have a headache with a stiff neck, and you feel tired or confused  · You have new pain or pressure in your chest     · Your symptoms, such as shortness of breath, vomiting, or diarrhea, get worse  · Your symptoms, such as fever and coughing, seem to get better, but then get worse  Contact your healthcare provider if:   · You have new muscle pain or weakness  · You have questions or concerns about your condition or care  Treatment for influenza  may include any of the following:  · Acetaminophen  decreases pain and fever  It is available without a doctor's order  Ask how much to take and how often to take it  Follow directions  Acetaminophen can cause liver damage if not taken correctly  · NSAIDs , such as ibuprofen, help decrease swelling, pain, and fever  This medicine is available with or without a doctor's order  NSAIDs can cause stomach bleeding or kidney problems in certain people  If you take blood thinner medicine, always ask your healthcare provider if NSAIDs are safe for you  Always read the medicine label and follow directions  · Antivirals  help fight a viral infection  Manage your symptoms:   · Rest  as much as you can to help you recover  · Drink liquids as directed  to help prevent dehydration  Ask how much liquid to drink each day and which liquids are best for you  Prevent the spread of the flu:   · Wash your hands often  Use soap and water  Wash your hands after you use the bathroom, change a child's diapers, or sneeze  Wash your hands before you prepare or eat food   Use gel hand cleanser when soap and water are not available  Do not touch your eyes, nose, or mouth unless you have washed your hands first        · Cover your mouth when you sneeze or cough  Cough into a tissue or the bend of your arm  · Clean shared items with a germ-killing   Clean table surfaces, doorknobs, and light switches  Do not share towels, silverware, and dishes with people who are sick  Wash bed sheets, towels, silverware, and dishes with soap and water  · Wear a mask  over your mouth and nose if you are sick or are near anyone who is sick  · Stay away from others  if you are sick  · Influenza vaccine  helps prevent influenza (flu)  Everyone older than 6 months should get a yearly influenza vaccine  Get the vaccine as soon as it is available, usually in September or October each year  Follow up with your healthcare provider as directed:  Write down your questions so you remember to ask them during your visits  © 2017 [facility] Information is for End User's use only and may not be sold, redistributed or otherwise used for commercial purposes  All illustrations and images included in CareNotes® are the copyrighted property of [employer]  or [employer]  The above information is an  only  It is not intended as medical advice for individual conditions or treatments  Talk to your doctor, nurse or pharmacist before following any medical regimen to see if it is safe and effective for you  Take once daily all days pills on this schedule 6- 6- 5- 4- 3- 2- 1   Take once daily all days pills on this schedule 5- 5- 4- 4- 3- 2- 1  Take once daily all days pills on this schedule 4- 4- 3- 3- 2-2- 1    FLU child    Your child has been diagnosed with influenza, a viral infection and his/her symptoms should resolve over the next 7 to 10 days with the treatments recommended today  If they do not, it is possible that they have developed a bacterial infection and you should return   If they were to take an antibiotic while they are still in the viral stage, they will not get better any faster, but the antibiotic could kill off the good germs in their body as well as make the germs  resistant to the antibiotic  Give an expectorant - guaifenesin should be the only ingredient - during the day, and the cough suppressant (ex  Robitussin DM or Tessalon) if needed at night only  May give a decongestant like PediaCare or Dimetapp  Hold any NSAIDs like Ibuprofen (Advil), Naprosyn (Aleve), etc while on steroids like Medrol or Prednisone  Influenza in Children   WHAT [place]:   What is influenza? Influenza  is an infection caused by a virus, it is easily spread when an infected person coughs, sneezes, or has close contact with others  Your child may be able to spread influenza to others for 1 week or longer after signs or symptoms appear  What are the signs and symptoms of influenza? Severe symptoms are more likely in children younger than 5 years  They are also more likely in children who have heart or lung disease, or a weak immune system  Your child may have any of the following:  · Fever and chills    · Headaches, body aches, earaches, and muscle or joint pain    · Dry cough, runny or stuffy nose, and sore throat    · Loss of appetite, nausea, vomiting, or diarrhea    · Tiredness     · Fast breathing, trouble breathing, or chest pain  How is influenza diagnosed? Your child's healthcare provider will examine your child  Tell him if your child has health problems such as epilepsy or asthma  Tell him if your child has been around sick people or traveled recently  A sample of fluid may be collected from your child's nose or throat and tested for the H1N1 influenza virus  How is influenza treated? Most healthy children get better within a week  Your child may need any of the following:  · Acetaminophen  decreases pain and fever  It is available without a doctor's order   Ask how much to give your child and how often to give it  Follow directions  Acetaminophen can cause liver damage if not taken correctly  · NSAIDs , such as ibuprofen, help decrease swelling, pain, and fever  This medicine is available with or without a doctor's order  NSAIDs can cause stomach bleeding or kidney problems in certain people  If your child takes blood thinner medicine, always ask if NSAIDs are safe for him  Always read the medicine label and follow directions  Do not give these medicines to children under 10months of age without direction from your child's healthcare provider  · Do not give aspirin to children under 25years of age  Your child could develop Reye syndrome if he takes aspirin  Reye syndrome can cause life-threatening brain and liver damage  Check your child's medicine labels for aspirin, salicylates, or oil of wintergreen  · Antivirals  help fight a viral infection  This medicine works best if it is given within 48 hours after symptoms begin  How can I manage my child's symptoms? · Help your child rest and sleep  as much as possible as he recovers  · Give your child liquids as directed  to help prevent dehydration  Ask your child's healthcare provider how much liquid your child should drink each day  Good liquids include water, fruit juice, or broth  · Use a cool mist humidifier  to increase air moisture in your home  This may make it easier for your child to breathe and help decrease his cough  How can I help prevent the spread of influenza? · Have your child wash his hands often  Have him use soap and water  Make sure he washes his hands after he uses the bathroom or sneezes, and before he eats  Use gel hand cleanser when soap and water are not available  Tell him not to touch his eyes, nose, or mouth unless he has washed his hands first        · Teach your child to cover his mouth when he sneezes or coughs  Show him how to cough into a tissue or the bend of his arm      · Clean shared items with a germ-killing   Clean table surfaces, doorknobs, and light switches  Do not let your child share towels, silverware, and dishes with people who are sick  Wash bed sheets, towels, silverware, and dishes with soap and hot water  · Wear a mask  over your mouth and nose when you are near your sick child  · Keep your child home if he is sick  Keep your child away from others as much as possible while he recovers  · Influenza vaccine  helps prevent influenza (flu)  Everyone older than 6 months should get a yearly influenza vaccine  Get the vaccine as soon as it is available, usually in September or October each year  Call 911 for any of the following:   · Your child has fast breathing, trouble breathing, or chest pain  · Your child has a seizure  · Your child does not want to be held and does not respond to you, or he does not wake up  When should I seek immediate care? · Your child has a fever with a rash  · Your child's skin is blue or gray  · Your child's symptoms get better, but then come back with a fever or a worse cough  · Your child will not drink liquids, is not urinating, or has no tears when he cries  · Your child has trouble breathing, a cough, and he vomits blood  When should I contact my child's healthcare provider? · Your child's symptoms get worse  · Your child has new symptoms, such as muscle pain or weakness  · You have questions or concerns about your child's condition or care  CARE AGREEMENT:   You have the right to help plan your child's care  Learn about your child's health condition and how it may be treated  Discuss treatment options with your child's caregivers to decide what care you want for your child  The above information is an  only  It is not intended as medical advice for individual conditions or treatments   Talk to your doctor, nurse or pharmacist before following any medical regimen to see if it is safe and effective for you  © 2017 [employer] Information is for End User's use only and may not be sold, redistributed or otherwise used for commercial purposes  All illustrations and images included in CareNotes® are the copyrighted property of [employer]  or [employer]  In light of this and prior year's serious flu complications, emphasized symptoms for which to go to the ED  Parent verbalized understanding  Hold any NSAIDs like Ibuprofen (Advil), Naprosyn (Aleve), etc while on steroids like Medrol or Prednisone  If you are diabetic, you should also adhere strictly to your diet and monitor your blood sugar closely while on the steroids as discussed  Follow up with PCP in 3-5 days  Proceed to  ER if symptoms worsen  Chief Complaint     Chief Complaint   Patient presents with    sinus pressure     sinus pressure and h/a  Had a covid negative test 1 week ago  Very tired         History of Present Illness       Patient complains of cough, congestion, generalized aches, dull bifrontal headache is for the past week  She tested negative for COVID last week  Review of Systems   Review of Systems   Constitutional: Negative for chills and fever  HENT: Positive for congestion, rhinorrhea and sinus pressure  Negative for sore throat, trouble swallowing and voice change  Respiratory: Positive for cough  Negative for chest tightness, shortness of breath and wheezing  Cardiovascular: Negative for chest pain  Musculoskeletal: Positive for myalgias  Neurological: Positive for headaches           Current Medications       Current Outpatient Medications:     albuterol (ProAir HFA) 90 mcg/act inhaler, Inhale 2 puffs every 6 (six) hours as needed for shortness of breath Dispense 1 spacer, Disp: 8 5 g, Rfl: 0    amLODIPine (NORVASC) 10 mg tablet, Take 10 mg by mouth daily, Disp: , Rfl:     atenolol (TENORMIN) 25 mg tablet, TAKE 1 TABLET (25 MG TOTAL) BY MOUTH DAILY   CANCEL CLONIDINE, Disp: , Rfl: 0    cloNIDine (CATAPRES) 0 2 mg tablet, Take 0 2 mg by mouth daily at bedtime, Disp: , Rfl:     Fesoterodine Fumarate ER 4 MG TB24, Take 1 tablet (4 mg total) by mouth daily at bedtime, Disp: 30 tablet, Rfl: 1    lovastatin (MEVACOR) 10 MG tablet, Take 10 mg by mouth, Disp: , Rfl:     SUMAtriptan (IMITREX) 100 mg tablet, Take 100 mg by mouth once as needed for migraine, Disp: , Rfl:     venlafaxine (EFFEXOR-XR) 75 mg 24 hr capsule, Take 75 mg by mouth, Disp: , Rfl:     buPROPion (WELLBUTRIN XL) 150 mg 24 hr tablet, Take 150 mg by mouth, Disp: , Rfl:     gabapentin (NEURONTIN) 600 MG tablet, TAKE 1 Tablet BY MOUTH TWICE DAILY FOR PAIN AND SLEEP, Disp: , Rfl: 0    hydroxychloroquine (PLAQUENIL) 200 mg tablet, Take 400 mg by mouth daily, Disp: , Rfl: 5    hydrOXYzine HCL (ATARAX) 25 mg tablet, Take 25 mg by mouth every 6 (six) hours as needed for itching, Disp: , Rfl:     ketotifen (ZADITOR) 0 025 % ophthalmic solution, Administer 1 drop into the left eye 2 (two) times a day (Patient not taking: Reported on 9/21/2020), Disp: 5 mL, Rfl: 0    meloxicam (MOBIC) 7 5 mg tablet, Take 7 5 mg by mouth, Disp: , Rfl:     metFORMIN (GLUCOPHAGE-XR) 500 mg 24 hr tablet, Take 1 tablet by mouth, Disp: , Rfl:     methocarbamol (ROBAXIN) 500 mg tablet, Take 1 tablet (500 mg total) by mouth 3 (three) times a day, Disp: 20 tablet, Rfl: 0    montelukast (SINGULAIR) 10 mg tablet, Take 10 mg by mouth daily at bedtime, Disp: , Rfl:     omeprazole (PriLOSEC) 10 mg delayed release capsule, Take 20 mg by mouth, Disp: , Rfl:     pantoprazole (PROTONIX) 20 mg tablet, Take 20 mg by mouth daily, Disp: , Rfl:     pramipexole (MIRAPEX) 1 mg tablet, Take 0 25 mg by mouth 3 (three) times a day, Disp: , Rfl:     pramipexole (MIRAPEX) 1 mg tablet, Take 1 mg by mouth, Disp: , Rfl:     predniSONE 10 mg tablet, Take 6 pills day one, 5 pills day 2, 4 pills day 3, 3 pills day 4, 2 pills day 5, and 1 pill day 6  (Patient not taking: Reported on 8/22/2020), Disp: 21 tablet, Rfl: 0    predniSONE 10 mg tablet, Take once daily all days pills on this schedule 6- 6- 5- 4- 3- 2- 1, Disp: 27 tablet, Rfl: 0    Current Allergies     Allergies as of 02/24/2021 - Reviewed 02/24/2021   Allergen Reaction Noted    Dog epithelium Swelling 11/14/2019    Ibuprofen Swelling 03/15/2018    Lisinopril Swelling 03/15/2018    Red dye Swelling 03/15/2018    Latex Rash 03/15/2018            The following portions of the patient's history were reviewed and updated as appropriate: allergies, current medications, past family history, past medical history, past social history, past surgical history and problem list      Past Medical History:   Diagnosis Date    Angioedema     Anxiety     Arthritis     Diverticulitis     Hypertension     Migraine     Overactive bladder     Restless leg        Past Surgical History:   Procedure Laterality Date    ADENOIDECTOMY      BARIATRIC SURGERY      HYSTERECTOMY      TONSILLECTOMY         Family History   Problem Relation Age of Onset    Diabetes Mother     Hypertension Mother     Heart disease Father     Hypertension Father          Medications have been verified  Objective   Pulse 94   Temp 98 °F (36 7 °C)   Resp 16   Ht 5' 2" (1 575 m)   Wt 98 9 kg (218 lb)   SpO2 96%   BMI 39 87 kg/m²        Physical Exam     Physical Exam  Vitals signs and nursing note reviewed  Constitutional:       General: She is not in acute distress  Appearance: She is well-developed  HENT:      Head: Normocephalic and atraumatic  Right Ear: Tympanic membrane normal       Left Ear: Tympanic membrane normal       Nose: Mucosal edema present  Mouth/Throat:      Pharynx: No oropharyngeal exudate or posterior oropharyngeal erythema  Tonsils: No tonsillar abscesses  Neck:      Musculoskeletal: Neck supple     Pulmonary:      Effort: Pulmonary effort is normal  No respiratory distress  Breath sounds: No wheezing or rales  Skin:     General: Skin is warm and dry  Neurological:      Mental Status: She is alert and oriented to person, place, and time  Psychiatric:         Mood and Affect: Mood normal          Behavior: Behavior normal          Thought Content:  Thought content normal          Judgment: Judgment normal

## 2021-02-24 NOTE — PATIENT INSTRUCTIONS
You have been diagnosed with a flu-like illness, and your symptoms should resolve over the next 7 to 10 days with the treatments recommended today  If they do not, it is possible that you have developed a bacterial infection and you should return  If you were to take an antibiotic while you are still in the viral stage, you will not get better any faster, but could kill off the good germs in your body as well as make the germs in you resistant to the antibiotic  Take an expectorant - guaifenesin should be the only ingredient - during the day, and the cough suppressant (ex  Robitussin DM or Tessalon) if needed at night only  Take Zinc 12 5 to 15 mg every 2 - 3 hrs while awake for the next few days  You may take Cold Donis (13 3 mg of Zinc) or split a 25 mg Zinc tablet or lozenge in two or a 50 mg into four to get the proper dose  The total daily dose of Zinc should exceed 75 mg per day  You may also take a decongestant like Sudafed, unless you have hypertension or cardiac disease  Hold any NSAIDs like Ibuprofen (Advil), Naprosyn (Aleve), etc while on steroids like Medrol or Prednisone  If you are diabetic, you should also adhere strictly to your diet and monitor your blood sugar closely while on the steroids as discussed  Flu-like illness   AMBULATORY CARE:   Flu-like illness is an infection caused by a virus  The flu is easily spread when an infected person coughs, sneezes, or has close contact with others  You may be able to spread the flu to others for 1 week or longer after signs or symptoms appear  Common signs and symptoms include the following:   · Fever and chills    · Headaches, body aches, and muscle or joint pain    · Cough, runny nose, and sore throat    · Loss of appetite, nausea, vomiting, or diarrhea    · Tiredness    · Trouble breathing  Call 911 for any of the following:   · You have trouble breathing, and your lips look purple or blue  · You have a seizure    Seek care immediately if: · You are dizzy, or you are urinating less or not at all  · You have a headache with a stiff neck, and you feel tired or confused  · You have new pain or pressure in your chest     · Your symptoms, such as shortness of breath, vomiting, or diarrhea, get worse  · Your symptoms, such as fever and coughing, seem to get better, but then get worse  Contact your healthcare provider if:   · You have new muscle pain or weakness  · You have questions or concerns about your condition or care  Treatment for influenza  may include any of the following:  · Acetaminophen  decreases pain and fever  It is available without a doctor's order  Ask how much to take and how often to take it  Follow directions  Acetaminophen can cause liver damage if not taken correctly  · NSAIDs , such as ibuprofen, help decrease swelling, pain, and fever  This medicine is available with or without a doctor's order  NSAIDs can cause stomach bleeding or kidney problems in certain people  If you take blood thinner medicine, always ask your healthcare provider if NSAIDs are safe for you  Always read the medicine label and follow directions  · Antivirals  help fight a viral infection  Manage your symptoms:   · Rest  as much as you can to help you recover  · Drink liquids as directed  to help prevent dehydration  Ask how much liquid to drink each day and which liquids are best for you  Prevent the spread of the flu:   · Wash your hands often  Use soap and water  Wash your hands after you use the bathroom, change a child's diapers, or sneeze  Wash your hands before you prepare or eat food  Use gel hand cleanser when soap and water are not available  Do not touch your eyes, nose, or mouth unless you have washed your hands first        · Cover your mouth when you sneeze or cough  Cough into a tissue or the bend of your arm  · Clean shared items with a germ-killing   Clean table surfaces, doorknobs, and light switches  Do not share towels, silverware, and dishes with people who are sick  Wash bed sheets, towels, silverware, and dishes with soap and water  · Wear a mask  over your mouth and nose if you are sick or are near anyone who is sick  · Stay away from others  if you are sick  · Influenza vaccine  helps prevent influenza (flu)  Everyone older than 6 months should get a yearly influenza vaccine  Get the vaccine as soon as it is available, usually in September or October each year  Follow up with your healthcare provider as directed:  Write down your questions so you remember to ask them during your visits  © 2017 [address]  Information is for End User's use only and may not be sold, redistributed or otherwise used for commercial purposes  All illustrations and images included in CareNotes® are the copyrighted property of [employer]  or Feng [employer]  The above information is an  only  It is not intended as medical advice for individual conditions or treatments  Talk to your doctor, nurse or pharmacist before following any medical regimen to see if it is safe and effective for you  Take once daily all days pills on this schedule 6- 6- 5- 4- 3- 2- 1   Take once daily all days pills on this schedule 5- 5- 4- 4- 3- 2- 1  Take once daily all days pills on this schedule 4- 4- 3- 3- 2-2- 1    FLU child    Your child has been diagnosed with influenza, a viral infection and his/her symptoms should resolve over the next 7 to 10 days with the treatments recommended today  If they do not, it is possible that they have developed a bacterial infection and you should return  If they were to take an antibiotic while they are still in the viral stage, they will not get better any faster, but the antibiotic could kill off the good germs in their body as well as make the germs  resistant to the antibiotic    Give an expectorant - guaifenesin should be the only ingredient - during the day, and the cough suppressant (ex  Robitussin DM or Tessalon) if needed at night only  May give a decongestant like PediaCare or Dimetapp  Hold any NSAIDs like Ibuprofen (Advil), Naprosyn (Aleve), etc while on steroids like Medrol or Prednisone  Influenza in Children   WHAT [place]:   What is influenza? Influenza  is an infection caused by a virus, it is easily spread when an infected person coughs, sneezes, or has close contact with others  Your child may be able to spread influenza to others for 1 week or longer after signs or symptoms appear  What are the signs and symptoms of influenza? Severe symptoms are more likely in children younger than 5 years  They are also more likely in children who have heart or lung disease, or a weak immune system  Your child may have any of the following:  · Fever and chills    · Headaches, body aches, earaches, and muscle or joint pain    · Dry cough, runny or stuffy nose, and sore throat    · Loss of appetite, nausea, vomiting, or diarrhea    · Tiredness     · Fast breathing, trouble breathing, or chest pain  How is influenza diagnosed? Your child's healthcare provider will examine your child  Tell him if your child has health problems such as epilepsy or asthma  Tell him if your child has been around sick people or traveled recently  A sample of fluid may be collected from your child's nose or throat and tested for the H1N1 influenza virus  How is influenza treated? Most healthy children get better within a week  Your child may need any of the following:  · Acetaminophen  decreases pain and fever  It is available without a doctor's order  Ask how much to give your child and how often to give it  Follow directions  Acetaminophen can cause liver damage if not taken correctly  · NSAIDs , such as ibuprofen, help decrease swelling, pain, and fever  This medicine is available with or without a doctor's order   NSAIDs can cause stomach bleeding or kidney problems in certain people  If your child takes blood thinner medicine, always ask if NSAIDs are safe for him  Always read the medicine label and follow directions  Do not give these medicines to children under 10months of age without direction from your child's healthcare provider  · Do not give aspirin to children under 25years of age  Your child could develop Reye syndrome if he takes aspirin  Reye syndrome can cause life-threatening brain and liver damage  Check your child's medicine labels for aspirin, salicylates, or oil of wintergreen  · Antivirals  help fight a viral infection  This medicine works best if it is given within 48 hours after symptoms begin  How can I manage my child's symptoms? · Help your child rest and sleep  as much as possible as he recovers  · Give your child liquids as directed  to help prevent dehydration  Ask your child's healthcare provider how much liquid your child should drink each day  Good liquids include water, fruit juice, or broth  · Use a cool mist humidifier  to increase air moisture in your home  This may make it easier for your child to breathe and help decrease his cough  How can I help prevent the spread of influenza? · Have your child wash his hands often  Have him use soap and water  Make sure he washes his hands after he uses the bathroom or sneezes, and before he eats  Use gel hand cleanser when soap and water are not available  Tell him not to touch his eyes, nose, or mouth unless he has washed his hands first        · Teach your child to cover his mouth when he sneezes or coughs  Show him how to cough into a tissue or the bend of his arm  · Clean shared items with a germ-killing   Clean table surfaces, doorknobs, and light switches  Do not let your child share towels, silverware, and dishes with people who are sick  Wash bed sheets, towels, silverware, and dishes with soap and hot water       · Wear a mask  over your mouth and nose when you are near your sick child  · Keep your child home if he is sick  Keep your child away from others as much as possible while he recovers  · Influenza vaccine  helps prevent influenza (flu)  Everyone older than 6 months should get a yearly influenza vaccine  Get the vaccine as soon as it is available, usually in September or October each year  Call 911 for any of the following:   · Your child has fast breathing, trouble breathing, or chest pain  · Your child has a seizure  · Your child does not want to be held and does not respond to you, or he does not wake up  When should I seek immediate care? · Your child has a fever with a rash  · Your child's skin is blue or gray  · Your child's symptoms get better, but then come back with a fever or a worse cough  · Your child will not drink liquids, is not urinating, or has no tears when he cries  · Your child has trouble breathing, a cough, and he vomits blood  When should I contact my child's healthcare provider? · Your child's symptoms get worse  · Your child has new symptoms, such as muscle pain or weakness  · You have questions or concerns about your child's condition or care  CARE AGREEMENT:   You have the right to help plan your child's care  Learn about your child's health condition and how it may be treated  Discuss treatment options with your child's caregivers to decide what care you want for your child  The above information is an  only  It is not intended as medical advice for individual conditions or treatments  Talk to your doctor, nurse or pharmacist before following any medical regimen to see if it is safe and effective for you  © 2017 [address] Information is for End User's use only and may not be sold, redistributed or otherwise used for commercial purposes   All illustrations and images included in CareNotes® are the copyrighted property of [employer]  or [employer]  In light of this and prior year's serious flu complications, emphasized symptoms for which to go to the ED  Parent verbalized understanding  Hold any NSAIDs like Ibuprofen (Advil), Naprosyn (Aleve), etc while on steroids like Medrol or Prednisone  If you are diabetic, you should also adhere strictly to your diet and monitor your blood sugar closely while on the steroids as discussed

## 2021-03-28 ENCOUNTER — OFFICE VISIT (OUTPATIENT)
Dept: URGENT CARE | Facility: CLINIC | Age: 51
End: 2021-03-28
Payer: COMMERCIAL

## 2021-03-28 VITALS
WEIGHT: 205 LBS | SYSTOLIC BLOOD PRESSURE: 145 MMHG | HEIGHT: 62 IN | HEART RATE: 66 BPM | RESPIRATION RATE: 18 BRPM | DIASTOLIC BLOOD PRESSURE: 91 MMHG | TEMPERATURE: 98.3 F | OXYGEN SATURATION: 97 % | BODY MASS INDEX: 37.73 KG/M2

## 2021-03-28 DIAGNOSIS — L23.7 POISON IVY DERMATITIS: Primary | ICD-10-CM

## 2021-03-28 PROCEDURE — 99213 OFFICE O/P EST LOW 20 MIN: CPT | Performed by: EMERGENCY MEDICINE

## 2021-03-28 RX ORDER — PREDNISONE 10 MG/1
TABLET ORAL
Qty: 27 TABLET | Refills: 0 | Status: SHIPPED | OUTPATIENT
Start: 2021-03-28

## 2021-03-28 NOTE — PATIENT INSTRUCTIONS
Use ice pack or cold compresses to avoid scratching  Take an H1 antihistamine like Benadryl or non-sedating antihistamine like Zyrtec, Allegra or Claritin, and an H 2 antihistamine like Pepcid or Zantac for itch  Hold any NSAIDs like Ibuprofen (Advil), Naprosyn (Aleve), etc while on steroids like Medrol or Prednisone  Use Calomine only, not Calodryl as discussed  Watch the video by Bam Kendall called How to avoid getting poison mo ever again    [employer]   WHAT YOU NEED TO KNOW:   What is poison ivy? Poison ivy is a plant that can cause an itchy, uncomfortable rash on your skin  Poison ivy grows as a shrub or vine in woods, fields, and areas of thick [place]  It has 3 bright green leaves on each stem that turn red in [place]  What causes a poison ivy rash? A poison ivy rash can occur when the plant oil soaks into your skin  You may get a rash if you touch:  · Any part of the poison ivy plant: This includes the leaves, stem, vine, roots, flowers, and berries  · Pets with poison ivy on their fur:  They can spread poison ivy oil to your skin and to items inside your car and house  · Items with poison ivy oil on them: This includes clothing, shoes, camping or sports equipment, or outdoor tools  · A person with poison ivy oil on him:  Poison ivy oil may be on their skin or clothing  What can I do if I have been exposed to poison ivy? If you think you have touched poison ivy, rinse your skin with cool water right away  Then, wash it with soap and water  Rinse your skin well  Do not use hot water because it may cause the oil to spread on your skin  You may also put rubbing alcohol or a solution of 1/2 alcohol and 1/2 water on your skin  This may help your rash to be less severe when it breaks out on your skin  What are the signs and symptoms of a poison ivy rash?    · A red, swollen, itchy rash that develops within hours to days of exposure to poison ivy    · A rash that appears in thick patches or thin lines where the plant leaves rubbed against your skin    · Blisters that may leak clear to yellow liquid, then crust over and become scaly    How is a poison ivy rash treated? · Antiseptic or drying creams or ointments:  Your healthcare provider may recommend medicine to dry out the rash and decrease the itching  These products may be available without a doctor's order  · Steroids: This medicine helps decrease itching and inflammation  It can be given as a cream to apply to your skin or as a pill  · Antihistamines: This medicine may help decrease itching and help you sleep  It is available without a doctor's order  How can I manage my symptoms? · Keep your rash clean and dry:  Wash it with soap and water  Gently pat it dry with a clean towel  · Try not to scratch or rub your rash: This can cause your skin to become infected  · Use a compress on your rash:  Dip a clean washcloth in cool water  Wring it out and place it on your rash  Leave the washcloth on your skin for 15 minutes  Do this at least 3 times per day  · Take a cornstarch or oatmeal bath: If your rash is too large to cover with wet washcloths, take 3 or 4 cornstarch baths daily  Mix 1 pound of cornstarch with a little water to make a paste  Add the paste to a tub full of water and mix well  You may also use colloidal oatmeal in the bath water  Use lukewarm water  Avoid hot water because it may cause your itching to increase  Can a poison ivy rash be spread by scratching or touching it? You cannot spread poison ivy by touching your rash or the liquid from your blisters  Poison ivy is spread only if you scratch your skin while it still has oil on it  You may think your rash is spreading because new rashes appear over a number of days  This happens because areas covered by thin skin break out in a rash first  Your face or forearms may develop a rash before thicker areas, such as the palms of your hands  How can I prevent a poison ivy rash? · Wear skin protection:  Wear long pants, a long-sleeved shirt, and gloves  Use a skin block lotion to protect your skin from poison ivy oil  You can find this at a drugstore without a prescription  · Wash clothing after possible exposure: If you think you have been near a poison ivy plant, wash the clothes you were wearing separately from other clothes  Rinse the washing machine well after you take the clothes out  Scrub boots and shoes with warm, soapy water  Dry clean items and clothing that you cannot wash in water  Poison ivy oil is sticky and can stay on surfaces for a long time  It can cause a new rash even years later  · Bathe your pet:  Use warm water and shampoo on your pet's fur  This will prevent the spread of oil to your skin, car, and home  Wear long sleeves, long pants, and gloves while washing pets or any items that may have oil on them  · Reduce exposure to poison ivy:  Do not touch plants that look like poison ivy  Keep your yard free of poison ivy  While protecting your skin, remove the plant and the roots  Place them in a plastic bag and seal the bag tightly  · Do not burn poison ivy plants: This can spread the oil through the air  If you breathe the oil into your lungs, you could have swelling and serious breathing problems  Oil that clings to the fire jolene can land on your skin and cause a rash  When should I contact my healthcare provider? · You have pus, soft yellow scabs, or tenderness on the rash  · The itching gets worse or keeps you awake at night  · The rash covers more than 1/4 of your skin or spreads to your eyes, mouth, or genital area  · The rash is not better after 2 to 3 weeks  · You have tender, swollen glands on the sides of your neck  · You have swelling in your arms and legs  · You have questions or concerns about your condition or care  When should I seek immediate care or call 911?    · You have a fever  · You have redness, swelling, and tenderness around the rash  · You have trouble breathing  CARE AGREEMENT:   You have the right to help plan your care  Learn about your health condition and how it may be treated  Discuss treatment options with your healthcare providers to decide what care you want to receive  You always have the right to refuse treatment  The above information is an  only  It is not intended as medical advice for individual conditions or treatments  Talk to your doctor, nurse or pharmacist before following any medical regimen to see if it is safe and effective for you  © Copyright [address] [place] [address] Information is for End User's use only and may not be sold, redistributed or otherwise used for commercial purposes   All illustrations and images included in CareNotes® are the copyrighted property of [employer]  or [address] [place] [address]

## 2021-03-28 NOTE — PROGRESS NOTES
[facility]        NAME: Luis Carlos Mcfdaden is a 48 y o  female  : 1970    MRN: [id number]  DATE: 2021  TIME: 8:33 AM    Assessment and Plan   No primary diagnosis found  No diagnosis found  Patient Instructions     Patient Instructions   Use ice pack or cold compresses to avoid scratching  Take an H1 antihistamine like Benadryl or non-sedating antihistamine like Zyrtec, Allegra or Claritin, and an H 2 antihistamine like Pepcid or Zantac for itch  Hold any NSAIDs like Ibuprofen (Advil), Naprosyn (Aleve), etc while on steroids like Medrol or Prednisone  Use Calomine only, not Calodryl as discussed  Watch the video by Zahra Vincent called How to avoid getting poison ivy ever again      Acute Rash   WHAT YOU NEED TO KNOW:   A rash is irritated, red, or itchy skin or mucus membranes, such as the lining of your nose or throat  Acute means the rash starts suddenly, worsens quickly, and lasts a short time  Common causes include a disease or infection, a reaction to something you are allergic to, or certain medicines  DISCHARGE INSTRUCTIONS:   Return to the emergency department if:   · You have sudden trouble breathing or chest pain  · You are vomiting, have a headache or muscle aches, and your throat hurts  Call your doctor or dermatologist if:   · You have a fever  · You get open wounds from scratching your skin, or you have a wound that is red, swollen, or painful  · Your rash lasts longer than 3 months  · You have swelling or pain in your joints  · You have questions or concerns about your condition or care  Medicines:  If your rash does not go away on its own, you may need the following medicines:  · Antihistamines  may be given to help decrease itching  · Steroids  may be given to decrease inflammation  · Antibiotics  help fight or prevent a bacterial infection  · Take your medicine as directed    Contact your healthcare provider if you think your medicine is not helping or if you have side effects  Tell him of her if you are allergic to any medicine  Keep a list of the medicines, vitamins, and herbs you take  Include the amounts, and when and why you take them  Bring the list or the pill bottles to follow-up visits  Carry your medicine list with you in case of an emergency  Prevent a rash or care for your skin when you have a rash:  Dry skin can lead to more problems  Do not scratch your skin if it itches  You may cause a skin infection by scratching  The following may prevent dry skin, and help your skin look better:  · Help soothe your rash  Apply thick cream lotions or petroleum jelly  Cool compresses may also soothe your skin  Apply a cool compress or a cool, wet towel, and then cover it with a dry towel  · Use lukewarm water when you bathe  Hot water may damage your skin more  Pat your skin dry  Do not rub your skin with a towel  · Use detergents, soaps, shampoos, and bubble baths  made for sensitive skin  · Wear clothes made of cotton instead of nylon or wool  Cotton is softer, so it will not hurt your skin as much  Follow up with your healthcare providers as directed:  A dermatologist may help find the cause of your rash or help plan or change treatment  A dietitian may help with meal planning if you have a food allergy  Write down your questions so you remember to ask them during your visits  © Copyright [address] Information is for End User's use only and may not be sold, redistributed or otherwise used for commercial purposes  All illustrations and images included in CareNotes® are the copyrighted property of [employer]  or [address] [place], [place] [address]tayla Jacinto   The above information is an  only  It is not intended as medical advice for individual conditions or treatments  Talk to your doctor, nurse or pharmacist before following any medical regimen to see if it is safe and effective for you          Follow up with PCP in 3-5 days  Proceed to  ER if symptoms worsen  Chief Complaint     Chief Complaint   Patient presents with    Rash     rash on hands, arms  blistering, itchy  started last week         History of Present Illness       Patient complains of pruritic rash of hands arms for past week  Onset after working under her deck  Review of Systems   Review of Systems   Constitutional: Negative for chills and fever  Respiratory: Negative for shortness of breath  Musculoskeletal: Negative for arthralgias, joint swelling, myalgias, neck pain and neck stiffness  Skin: Positive for color change and rash  Negative for wound  Neurological: Negative for dizziness and headaches  Current Medications       Current Outpatient Medications:     albuterol (ProAir HFA) 90 mcg/act inhaler, Inhale 2 puffs every 6 (six) hours as needed for shortness of breath Dispense 1 spacer, Disp: 8 5 g, Rfl: 0    amLODIPine (NORVASC) 10 mg tablet, Take 10 mg by mouth daily, Disp: , Rfl:     atenolol (TENORMIN) 25 mg tablet, TAKE 1 TABLET (25 MG TOTAL) BY MOUTH DAILY   CANCEL CLONIDINE, Disp: , Rfl: 0    cloNIDine (CATAPRES) 0 2 mg tablet, Take 0 2 mg by mouth daily at bedtime, Disp: , Rfl:     Fesoterodine Fumarate ER 4 MG TB24, Take 1 tablet (4 mg total) by mouth daily at bedtime, Disp: 30 tablet, Rfl: 1    hydrOXYzine HCL (ATARAX) 25 mg tablet, Take 25 mg by mouth every 6 (six) hours as needed for itching, Disp: , Rfl:     methocarbamol (ROBAXIN) 500 mg tablet, Take 1 tablet (500 mg total) by mouth 3 (three) times a day, Disp: 20 tablet, Rfl: 0    pantoprazole (PROTONIX) 20 mg tablet, Take 20 mg by mouth daily, Disp: , Rfl:     pramipexole (MIRAPEX) 1 mg tablet, Take 0 25 mg by mouth 3 (three) times a day, Disp: , Rfl:     pramipexole (MIRAPEX) 1 mg tablet, Take 1 mg by mouth, Disp: , Rfl:     SUMAtriptan (IMITREX) 100 mg tablet, Take 100 mg by mouth once as needed for migraine, Disp: , Rfl:     buPROPion [facility] XL) 150 mg 24 hr tablet, Take 150 mg by mouth, Disp: , Rfl:     gabapentin (NEURONTIN) 600 MG tablet, TAKE 1 Tablet BY MOUTH TWICE DAILY FOR PAIN AND SLEEP, Disp: , Rfl: 0    hydroxychloroquine (PLAQUENIL) 200 mg tablet, Take 400 mg by mouth daily, Disp: , Rfl: 5    ketotifen (ZADITOR) 0 025 % ophthalmic solution, Administer 1 drop into the left eye 2 (two) times a day (Patient not taking: Reported on 9/21/2020), Disp: 5 mL, Rfl: 0    lovastatin (MEVACOR) 10 MG tablet, Take 10 mg by mouth, Disp: , Rfl:     meloxicam (MOBIC) 7 5 mg tablet, Take 7 5 mg by mouth, Disp: , Rfl:     metFORMIN (GLUCOPHAGE-XR) 500 mg 24 hr tablet, Take 1 tablet by mouth, Disp: , Rfl:     montelukast (SINGULAIR) 10 mg tablet, Take 10 mg by mouth daily at bedtime, Disp: , Rfl:     omeprazole (PriLOSEC) 10 mg delayed release capsule, Take 20 mg by mouth, Disp: , Rfl:     predniSONE 10 mg tablet, Take 6 pills day one, 5 pills day 2, 4 pills day 3, 3 pills day 4, 2 pills day 5, and 1 pill day 6   (Patient not taking: Reported on 8/22/2020), Disp: 21 tablet, Rfl: 0    predniSONE 10 mg tablet, Take once daily all days pills on this schedule 6- 6- 5- 4- 3- 2- 1, Disp: 27 tablet, Rfl: 0    venlafaxine (EFFEXOR-XR) 75 mg 24 hr capsule, Take 75 mg by mouth, Disp: , Rfl:     Current Allergies     Allergies as of 03/28/2021 - Reviewed 03/28/2021   Allergen Reaction Noted    Dog epithelium Swelling 11/14/2019    Ibuprofen Swelling 03/15/2018    Lisinopril Swelling 03/15/2018    Red dye Swelling 03/15/2018    Latex Rash 03/15/2018            The following portions of the patient's history were reviewed and updated as appropriate: allergies, current medications, past family history, past medical history, past social history, past surgical history and problem list      Past Medical History:   Diagnosis Date    Angioedema     Anxiety     Arthritis     Diverticulitis     Hypertension     Migraine     Overactive bladder     Restless leg Past Surgical History:   Procedure Laterality Date    ADENOIDECTOMY      BARIATRIC SURGERY      HYSTERECTOMY      TONSILLECTOMY         Family History   Problem Relation Age of Onset    Diabetes Mother     Hypertension Mother     Heart disease Father     Hypertension Father          Medications have been verified  Objective   Ht 5' 2" (1 575 m)   Wt 93 kg (205 lb)   BMI 37 49 kg/m²        Physical Exam     Physical Exam  Vitals signs and nursing note reviewed  Constitutional:       General: She is not in acute distress  Appearance: She is well-developed  HENT:      Head: Normocephalic and atraumatic  Right Ear: Tympanic membrane normal       Left Ear: Tympanic membrane normal       Nose: Mucosal edema present  Mouth/Throat:      Pharynx: Posterior oropharyngeal erythema present  No oropharyngeal exudate  Tonsils: No tonsillar abscesses  Neck:      Musculoskeletal: Neck supple  Skin:     General: Skin is warm and dry  Findings: Erythema and rash present  Neurological:      Mental Status: She is alert and oriented to person, place, and time  Psychiatric:         Behavior: Behavior normal          Thought Content:  Thought content normal          Judgment: Judgment normal

## 2021-06-11 ENCOUNTER — OFFICE VISIT (OUTPATIENT)
Dept: URGENT CARE | Facility: CLINIC | Age: 51
End: 2021-06-11
Payer: COMMERCIAL

## 2021-06-11 VITALS
WEIGHT: 205 LBS | DIASTOLIC BLOOD PRESSURE: 94 MMHG | BODY MASS INDEX: 37.73 KG/M2 | HEIGHT: 62 IN | RESPIRATION RATE: 18 BRPM | TEMPERATURE: 98.6 F | SYSTOLIC BLOOD PRESSURE: 197 MMHG | OXYGEN SATURATION: 96 % | HEART RATE: 87 BPM

## 2021-06-11 DIAGNOSIS — M25.511 ACUTE PAIN OF RIGHT SHOULDER: ICD-10-CM

## 2021-06-11 DIAGNOSIS — M54.2 NECK PAIN, ACUTE: Primary | ICD-10-CM

## 2021-06-11 DIAGNOSIS — G43.009 MIGRAINE WITHOUT AURA AND WITHOUT STATUS MIGRAINOSUS, NOT INTRACTABLE: ICD-10-CM

## 2021-06-11 PROCEDURE — G0382 LEV 3 HOSP TYPE B ED VISIT: HCPCS | Performed by: EMERGENCY MEDICINE

## 2021-06-11 PROCEDURE — 96372 THER/PROPH/DIAG INJ SC/IM: CPT | Performed by: EMERGENCY MEDICINE

## 2021-06-11 RX ORDER — TRAMADOL HYDROCHLORIDE 50 MG/1
50 TABLET ORAL EVERY 6 HOURS PRN
Qty: 8 TABLET | Refills: 0 | Status: SHIPPED | OUTPATIENT
Start: 2021-06-11

## 2021-06-11 RX ORDER — CYCLOBENZAPRINE HCL 10 MG
10 TABLET ORAL 3 TIMES DAILY PRN
Qty: 20 TABLET | Refills: 0 | Status: SHIPPED | OUTPATIENT
Start: 2021-06-11

## 2021-06-11 NOTE — PATIENT INSTRUCTIONS
Migraine Headache   WHAT YOU NEED TO KNOW:   A migraine is a severe headache  The pain can be so severe that it interferes with your daily activities  A migraine can last a few hours up to several days  The exact cause of migraines is not known  DISCHARGE INSTRUCTIONS:   Return to the emergency department if:   · You have a headache that seems different or much worse than your usual migraine headache  · You have a severe headache with a fever or a stiff neck  · You have new problems with speech, vision, balance, or movement  · You feel like you are going to faint, you become confused, or you have a seizure  Contact your healthcare provider or neurologist if:   · Your migraines interfere with your daily activities  · Your medicines or treatments stop working  · You have questions or concerns about your condition or care  Medicines: You may need any of the following  Take medicine as soon as you feel a migraine begin  · Prescription pain medicine  may be given  Do not wait until the pain is severe before you take your medicine  · Migraine medicines  are used to help prevent a migraine or stop it once it starts  · Antinausea medicine  may be given to calm your stomach and to help prevent vomiting  This medicine can also help relieve pain  · Take your medicine as directed  Contact your healthcare provider if you think your medicine is not helping or if you have side effects  Tell him or her if you are allergic to any medicine  Keep a list of the medicines, vitamins, and herbs you take  Include the amounts, and when and why you take them  Bring the list or the pill bottles to follow-up visits  Carry your medicine list with you in case of an emergency  Manage your symptoms:   · Rest in a dark, quiet room  This will help decrease your pain  Sleep may also help relieve the pain  · Apply ice to decrease pain  Use an ice pack, or put crushed ice in a plastic bag   Cover the ice pack with a towel and place it on your head  Apply ice for 15 to 20 minutes every hour  · Apply heat to decrease pain and muscle spasms  Use a small towel dampened with warm water or a heating pad, or sit in a warm bath  Apply heat on the area for 20 to 30 minutes every 2 hours  You may alternate heat and ice  · Keep a migraine record  Write down when your migraines start and stop  Include your symptoms and what you were doing when a migraine began  Record what you ate or drank for 24 hours before the migraine started  Keep track of what you did to treat your migraine and if it worked  Bring the migraine record with you to visits with your healthcare provider  Follow up with your healthcare provider or neurologist as directed:  Bring your migraine record with you  Write down your questions so you remember to ask them during your visits  Prevent another migraine:   · Do not smoke  Nicotine and other chemicals in cigarettes and cigars can trigger a migraine or make it worse  Ask your healthcare provider for information if you currently smoke and need help to quit  E-cigarettes or smokeless tobacco still contain nicotine  Talk to your healthcare provider before you use these products  · Do not drink alcohol  Alcohol can trigger a migraine  It can also keep medicines used to treat your migraines from working  · Get regular exercise  Exercise may help prevent migraines  Talk to your healthcare provider about the best exercise plan for you  Try to get at least 30 minutes of exercise on most days  · Manage stress  Stress may trigger a migraine  Learn new ways to relax, such as deep breathing  · Create a sleep schedule  Go to bed and get up at the same times each day  Do not watch television before bed  · Eat regular meals  Include healthy foods such as include fruit, vegetables, whole-grain breads, low-fat dairy products, beans, lean meat, and fish   Do not have food or drinks that trigger your migraines  © Copyright [address] Information is for End User's use only and may not be sold, redistributed or otherwise used for commercial purposes  All illustrations and images included in CareNotes® are the copyrighted property of [employer]  or Berna [employer]  The above information is an  only  It is not intended as medical advice for individual conditions or treatments  Talk to your doctor, nurse or pharmacist before following any medical regimen to see if it is safe and effective for you  Head Injury   AMBULATORY CARE:   A head injury  can include your scalp, face, skull, or brain and range from mild to severe  Effects can appear immediately after the injury or develop later  The effects may last a short time or be permanent  Healthcare providers may want to check your recovery over time  Treatment may change as you recover or develop new health problems from the head injury  Common signs and symptoms:   · An open scalp or skin wound, swelling, or bruising    · Mild to moderate headache    · Dizziness or loss of balance    · Nausea or vomiting    · Ringing in the ears or neck pain    · Confusion, especially right after the injury    · Change in mood, such as feeling restless or irritable    · Trouble thinking, remembering, or concentrating    · Drowsiness or decreased amount of energy    · Trouble sleeping    Call your local emergency number (911 in the [address]), or have someone else call if:   · You cannot be woken  · You have a seizure  · You stop responding to others or you faint  · You have blurry or double vision  · Your speech becomes slurred or confused  · You have arm or leg weakness, loss of feeling, or new problems with coordination  · Your pupils are larger than usual, or one pupil is a different size than the other  · You have blood or clear fluid coming out of your ears or nose      Seek care immediately if:   · You have repeated or forceful vomiting  · You feel confused  · Your headache gets worse or becomes severe  · You or someone caring for you notices that you are harder to wake than usual     Call your doctor if:   · Your symptoms last longer than 6 weeks after the injury  · You have questions or concerns about your condition or care  Medicines:   · Acetaminophen  decreases pain and fever  It is available without a doctor's order  Ask how much to take and how often to take it  Follow directions  Read the labels of all other medicines you are using to see if they also contain acetaminophen, or ask your doctor or pharmacist  Acetaminophen can cause liver damage if not taken correctly  Do not use more than 4 grams (4,000 milligrams) total of acetaminophen in one day  · Take your medicine as directed  Contact your healthcare provider if you think your medicine is not helping or if you have side effects  Tell him or her if you are allergic to any medicine  Keep a list of the medicines, vitamins, and herbs you take  Include the amounts, and when and why you take them  Bring the list or the pill bottles to follow-up visits  Carry your medicine list with you in case of an emergency  Self-care:   · Rest  or do quiet activities  Limit your time watching TV, using the computer, or doing tasks that require a lot of thinking  Slowly return to your normal activities as directed  Do not play sports or do activities that may cause you to get hit in the head  Ask your healthcare provider when you can return to sports  · Apply ice  on your head for 15 to 20 minutes every hour or as directed  Use an ice pack, or put crushed ice in a plastic bag  Cover it with a towel before you apply it to your skin  Ice helps prevent tissue damage and decreases swelling and pain  · Have someone stay with you for 24 hours  , or as directed  This person can monitor you for problems and call for help if needed   When you are awake, the person should ask you a few questions every few hours to see if you are thinking clearly  An example is to ask your name or address  Prevent another head injury:   · Wear a helmet that fits properly  Do this when you play sports, or ride a bike, scooter, or skateboard  Helmets help decrease your risk for a serious head injury  Talk to your healthcare provider about other ways you can protect yourself if you play sports  · Wear your seatbelt every time you are in a car  This helps lower your risk for a head injury if you are in a car accident  Follow up with your doctor as directed:  Write down your questions so you remember to ask them during your visits  © Copyright [address] Information is for End User's use only and may not be sold, redistributed or otherwise used for commercial purposes  All illustrations and images included in CareNotes® are the copyrighted property of A [employer] M [employer]  or [employer]   The above information is an  only  It is not intended as medical advice for individual conditions or treatments  Talk to your doctor, nurse or pharmacist before following any medical regimen to see if it is safe and effective for you  Shoulder Pain   WHAT YOU NEED TO KNOW:   Shoulder pain is a common problem that can affect your daily activities  Pain can be caused by a problem within your shoulder, such as soreness of a tendon or bursa  A tendon is a cord of tough tissue that connects your muscles to your bones  The bursa is a fluid-filled sac that acts as a cushion between a bone and a tendon  Shoulder pain may also be caused by pain that spreads to your shoulder from another part of your body  DISCHARGE INSTRUCTIONS:   Return to the emergency department if:   · You have severe pain  · You cannot move your arm or shoulder  · You have numbness or tingling in your shoulder or arm      Contact your healthcare provider if:   · Your pain gets worse or does not go away with treatment  · You have trouble moving your arm or shoulder  · You have questions or concerns about your condition or care  Medicines: You may need any of the following:  · Acetaminophen  decreases pain and fever  It is available without a doctor's order  Ask how much to take and how often to take it  Follow directions  Read the labels of all other medicines you are using to see if they also contain acetaminophen, or ask your doctor or pharmacist  Acetaminophen can cause liver damage if not taken correctly  Do not use more than 4 grams (4,000 milligrams) total of acetaminophen in one day  · NSAIDs , such as ibuprofen, help decrease swelling, pain, and fever  This medicine is available with or without a doctor's order  NSAIDs can cause stomach bleeding or kidney problems in certain people  If you take blood thinner medicine, always ask your healthcare provider if NSAIDs are safe for you  Always read the medicine label and follow directions  · Take your medicine as directed  Contact your healthcare provider if you think your medicine is not helping or if you have side effects  Tell him of her if you are allergic to any medicine  Keep a list of the medicines, vitamins, and herbs you take  Include the amounts, and when and why you take them  Bring the list or the pill bottles to follow-up visits  Carry your medicine list with you in case of an emergency  Manage your symptoms:   · Apply ice  on your shoulder for 20 to 30 minutes every 2 hours or as directed  Use an ice pack, or put crushed ice in a plastic bag  Cover it with a towel before you apply it to your shoulder  Ice helps prevent tissue damage and decreases swelling and pain  · Apply heat if ice does not help your symptoms  Apply heat on your shoulder for 20 to 30 minutes every 2 hours for as many days as directed  Heat helps decrease pain and muscle spasms  · Limit activities as directed    Try to avoid repeated overhead movements  · Go to physical or occupational therapy as directed  A physical therapist teaches you exercises to help improve movement and strength, and to decrease pain  An occupational therapist teaches you skills to help with your daily activities  Prevent shoulder pain:   · Maintain a good range of motion in your shoulder  Ask your healthcare provider which exercises you should do on a regular basis after you have healed  · Stretch and strengthen your shoulder  Use proper technique during exercises and sports  Follow up with your healthcare provider or orthopedist as directed:  Write down your questions so you remember to ask them during your visits  © Copyright [address] Information is for End User's use only and may not be sold, redistributed or otherwise used for commercial purposes  All illustrations and images included in CareNotes® are the copyrighted property of [employer] M [employer] Inc  or [facility] A Jacinto   The above information is an  only  It is not intended as medical advice for individual conditions or treatments  Talk to your doctor, nurse or pharmacist before following any medical regimen to see if it is safe and effective for you  Acute Neck Pain   WHAT YOU NEED TO KNOW:   Acute neck pain starts suddenly, increases quickly, and goes away in a few days  The pain may come and go, or be worse with certain movements  The pain may be only in your neck, or it may move to your arms, back, or shoulders  You may also have pain that starts in another body area and moves to your neck  DISCHARGE INSTRUCTIONS:   Return to the emergency department if:   · You have an injury that causes neck pain and shooting pain down your arms or legs  · Your neck pain suddenly becomes severe  · You have neck pain along with numbness, tingling, or weakness in your arms or legs  · You have a stiff neck, a headache, and a fever      Contact your healthcare provider if:   · You have new or worsening symptoms  · Your symptoms continue even after treatment  · You have questions or concerns about your condition or care  Medicines:   · NSAIDs , such as ibuprofen, help decrease swelling, pain, and fever  This medicine is available without a doctor's order  Ask your healthcare provider which medicine to take and how often to take it  Follow directions  NSAIDs can cause stomach bleeding or kidney problems if not taken correctly  If you take blood thinner medicine, always ask if NSAIDs are safe for you  · Acetaminophen  helps decrease pain and fever  Ask your healthcare provider how much to take and how often to take it  Follow directions  Acetaminophen can cause liver damage if not taken correctly  · Steroid medicine  may be used to reduce inflammation  This can help relieve pain caused by swelling  · Take your medicine as directed  Contact your healthcare provider if you think your medicine is not helping or if you have side effects  Tell him or her if you are allergic to any medicine  Keep a list of the medicines, vitamins, and herbs you take  Include the amounts, and when and why you take them  Bring the list or the pill bottles to follow-up visits  Carry your medicine list with you in case of an emergency  Manage or prevent acute neck pain:   · Rest your neck as directed  Do not make sudden movements, such as turning your head quickly  Your healthcare provider may recommend you wear a cervical collar for a short time  The collar will prevent you from moving your head  This will give your neck time to heal if an injury is causing your neck pain  Ask your healthcare provider when you can return to sports or other normal daily activities  · Apply heat as directed  Heat helps relieve pain and swelling  Use a heat wrap, or soak a small towel in warm water  Wring out the extra water  Apply the heat wrap or towel for 20 minutes every hour, or as directed      · Apply ice as directed  Ice helps relieve pain and swelling, and can help prevent tissue damage  Use an ice pack, or put ice in a bag  Cover the ice pack or back with a towel before you apply it to your neck  Apply the ice pack or ice for 15 minutes every hour, or as directed  Your healthcare provider can tell you how often to apply ice  · Do neck exercises as directed  Neck exercises help strengthen the muscles and increase range of motion  Your healthcare provider will tell you which exercises are right for you  He may give you instructions, or he may recommend that you work with a physical therapist  Your healthcare provider or therapist can make sure you are doing the exercises correctly  · Maintain good posture  Try to keep your head and shoulders lifted when you sit  If you work in front of a computer, make sure the monitor is at the right level  You should not need to look up down to see the screen  You should also not have to lean forward to be able to read what is on the screen  Make sure your keyboard, mouse, and other computer items are placed where you do not have to extend your shoulder to reach them  Get up often if you work in front of a computer or sit for long periods of time  Stretch or walk around to keep your neck muscles loose  Follow up with your healthcare provider as directed: Your healthcare provider may refer you to a specialist if your pain does not get better with treatment  Write down your questions so you remember to ask them during your visits  © Copyright [address] Information is for End User's use only and may not be sold, redistributed or otherwise used for commercial purposes  All illustrations and images included in CareNotes® are the copyrighted property of [employer] M , Inc  or [facility] Jorge A Jacinto   The above information is an  only  It is not intended as medical advice for individual conditions or treatments   Talk to your doctor, nurse or pharmacist before following any medical regimen to see if it is safe and effective for you  Back Pain   WHAT YOU NEED TO KNOW:   Back pain is common  It can be caused by many conditions, such as arthritis or the breakdown of spinal discs  Your risk for back pain is increased by injuries, lack of activity, or repeated bending and twisting  You may feel sore or stiff on one or both sides of your back  The pain may spread to your buttocks or thighs  DISCHARGE INSTRUCTIONS:   Return to the emergency department if:   · You have pain, numbness, or weakness in one or both legs  · Your pain becomes so severe that you cannot walk  · You cannot control your urine or bowel movements  · You have severe back pain with chest pain  · You have severe back pain, nausea, and vomiting  · You have severe back pain that spreads to your side or genital area  Contact your healthcare provider if:   · You have back pain that does not get better with rest and pain medicine  · You have a fever  · You have pain that worsens when you are on your back or when you rest     · You have pain that worsens when you cough or sneeze  · You lose weight without trying  · You have questions or concerns about your condition or care  Medicines:   · NSAIDs  help decrease swelling and pain  This medicine is available with or without a doctor's order  NSAIDs can cause stomach bleeding or kidney problems in certain people  If you take blood thinner medicine, always ask your healthcare provider if NSAIDs are safe for you  Always read the medicine label and follow directions  · Acetaminophen  decreases pain and fever  It is available without a doctor's order  Ask how much to take and how often to take it  Follow directions  Read the labels of all other medicines you are using to see if they also contain acetaminophen, or ask your doctor or pharmacist  Acetaminophen can cause liver damage if not taken correctly   Do not use more than 4 grams (4,000 milligrams) total of acetaminophen in one day  · Muscle relaxers  help decrease muscle spasms and back pain  · Prescription pain medicine  may be given  Ask your healthcare provider how to take this medicine safely  Some prescription pain medicines contain acetaminophen  Do not take other medicines that contain acetaminophen without talking to your healthcare provider  Too much acetaminophen may cause liver damage  Prescription pain medicine may cause constipation  Ask your healthcare provider how to prevent or treat constipation  · Take your medicine as directed  Contact your healthcare provider if you think your medicine is not helping or if you have side effects  Tell him or her if you are allergic to any medicine  Keep a list of the medicines, vitamins, and herbs you take  Include the amounts, and when and why you take them  Bring the list or the pill bottles to follow-up visits  Carry your medicine list with you in case of an emergency  How to manage your back pain:   · Apply ice  on your back for 15 to 20 minutes every hour or as directed  Use an ice pack, or put crushed ice in a plastic bag  Cover it with a towel before you apply it to your skin  Ice helps prevent tissue damage and decreases pain  · Apply heat  on your back for 20 to 30 minutes every 2 hours for as many days as directed  Heat helps decrease pain and muscle spasms  · Stay active  as much as you can without causing more pain  Bed rest could make your back pain worse  Avoid heavy lifting until your pain is gone  · Go to physical therapy as directed  A physical therapist can teach you exercises to help improve movement and strength, and to decrease pain  Follow up with your healthcare provider in 2 weeks, or as directed:  Write down your questions so you remember to ask them during your visits    © Copyright [address] Information is for End User's use only and may not be sold, redistributed or otherwise used for commercial purposes  All illustrations and images included in CareNotes® are the copyrighted property of [employer]  or Berna Bennett  The above information is an  only  It is not intended as medical advice for individual conditions or treatments  Talk to your doctor, nurse or pharmacist before following any medical regimen to see if it is safe and effective for you

## 2021-06-11 NOTE — PROGRESS NOTES
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 , Rfl:     Fesoterodine Fumarate ER 4 MG TB24, Take 1 tablet (4 mg total) by mouth daily at bedtime, Disp: 30 tablet, Rfl: 1    hydrOXYzine HCL (ATARAX) 25 mg tablet, Take 25 mg by mouth every 6 (six) hours as needed for itching, Disp: , Rfl:     pantoprazole (PROTONIX) 20 mg tablet, Take 20 mg by mouth daily, Disp: , Rfl:     pramipexole (MIRAPEX) 1 mg tablet, Take 0 25 mg by mouth 3 (three) times a day, Disp: , Rfl:     pramipexole (MIRAPEX) 1 mg tablet, Take 1 mg by mouth, Disp: , Rfl:     SUMAtriptan (IMITREX) 100 mg tablet, Take 100 mg by mouth once as needed for migraine, Disp: , Rfl:     venlafaxine (EFFEXOR-XR) 75 mg 24 hr capsule, Take 75 mg by mouth, Disp: , Rfl:     buPROPion (WELLBUTRIN XL) 150 mg 24 hr tablet, Take 150 mg by mouth, Disp: , Rfl:     cyclobenzaprine (FLEXERIL) 10 mg tablet, Take 1 tablet (10 mg total) by mouth 3 (three) times a day as needed for muscle spasms, Disp: 20 tablet, Rfl: 0    gabapentin (NEURONTIN) 600 MG tablet, TAKE 1 Tablet BY MOUTH TWICE DAILY FOR PAIN AND SLEEP, Disp: , Rfl: 0    hydroxychloroquine (PLAQUENIL) 200 mg tablet, Take 400 mg by mouth daily, Disp: , Rfl: 5    ketotifen (ZADITOR) 0 025 % ophthalmic solution, Administer 1 drop into the left eye 2 (two) times a day (Patient not taking: Reported on 9/21/2020), Disp: 5 mL, Rfl: 0    lovastatin (MEVACOR) 10 MG tablet, Take 10 mg by mouth, Disp: , Rfl:     meloxicam (MOBIC) 7 5 mg tablet, Take 7 5 mg by mouth, Disp: , Rfl:     metFORMIN (GLUCOPHAGE-XR) 500 mg 24 hr tablet, Take 1 tablet by mouth, Disp: , Rfl:     methocarbamol (ROBAXIN) 500 mg tablet, Take 1 tablet (500 mg total) by mouth 3 (three) times a day (Patient not taking: Reported on 6/11/2021), Disp: 20 tablet, Rfl: 0    montelukast (SINGULAIR) 10 mg tablet, Take 10 mg by mouth daily at bedtime, Disp: , Rfl:     omeprazole (PriLOSEC) 10 mg delayed release capsule, Take 20 mg by mouth, Disp: , Rfl:     predniSONE 10 mg tablet, Take 6 pills day one, 5 pills day 2, 4 pills day 3, 3 pills day 4, 2 pills day 5, and 1 pill day 6   (Patient not taking: Reported on 8/22/2020), Disp: 21 tablet, Rfl: 0    predniSONE 10 mg tablet, Take once daily all days pills on this schedule 6- 6- 5- 4- 3- 2- 1 (Patient not taking: Reported on 6/11/2021), Disp: 27 tablet, Rfl: 0    predniSONE 10 mg tablet, Take once daily all days pills on this schedule 6- 6- 5- 4- 3- 2- 1 (Patient not taking: Reported on 6/11/2021), Disp: 27 tablet, Rfl: 0    traMADol (ULTRAM) 50 mg tablet, Take 1 tablet (50 mg total) by mouth every 6 (six) hours as needed for moderate pain, Disp: 8 tablet, Rfl: 0    Current Allergies     Allergies as of 06/11/2021 - Reviewed 06/11/2021   Allergen Reaction Noted    Dog epithelium Swelling 11/14/2019    Ibuprofen Swelling 03/15/2018    Lisinopril Swelling 03/15/2018    Red dye - food allergy Swelling 03/15/2018    Latex Rash 03/15/2018            The following portions of the patient's history were reviewed and updated as appropriate: allergies, current medications, past family history, past medical history, past social history, past surgical history and problem list      Past Medical History:   Diagnosis Date    Angioedema     Anxiety     Arthritis     Diverticulitis     Hypertension     Migraine     Overactive bladder     Restless leg        Past Surgical History:   Procedure Laterality Date    ADENOIDECTOMY      BARIATRIC SURGERY      HYSTERECTOMY      TONSILLECTOMY         Family History   Problem Relation Age of Onset    Diabetes Mother     Hypertension Mother     Heart disease Father     Hypertension Father          Medications have been verified  Objective   BP (!) 197/94   Pulse 87   Temp 98 6 °F (37 °C)   Resp 18   Ht 5' 2" (1 575 m)   Wt 93 kg (205 lb)   SpO2 96%   BMI 37 49 kg/m²        Physical Exam     Physical Exam  Vitals signs and nursing note reviewed  Constitutional:       Appearance: She is well-developed  HENT:      Head: Normocephalic and atraumatic  Eyes:      Conjunctiva/sclera: Conjunctivae normal       Pupils: Pupils are equal, round, and reactive to light  Neck:      Musculoskeletal: Normal range of motion and neck supple  Musculoskeletal:         General: Tenderness present  Comments: Tender right upper and mid trapezius muscles and right greater than left scalenes  Skin:     General: Skin is warm and dry  Findings: No rash  Neurological:      General: No focal deficit present  Mental Status: She is alert and oriented to person, place, and time  Mental status is at baseline  Deep Tendon Reflexes: Reflexes normal    Psychiatric:         Mood and Affect: Mood normal          Behavior: Behavior normal          Thought Content:  Thought content normal          Judgment: Judgment normal

## 2021-07-01 ENCOUNTER — EVALUATION (OUTPATIENT)
Dept: PHYSICAL THERAPY | Facility: CLINIC | Age: 51
End: 2021-07-01
Payer: COMMERCIAL

## 2021-07-01 DIAGNOSIS — M25.511 ACUTE PAIN OF RIGHT SHOULDER: ICD-10-CM

## 2021-07-01 DIAGNOSIS — M54.2 NECK PAIN, ACUTE: ICD-10-CM

## 2021-07-01 PROCEDURE — 97162 PT EVAL MOD COMPLEX 30 MIN: CPT | Performed by: PHYSICAL THERAPIST

## 2021-07-01 PROCEDURE — 97110 THERAPEUTIC EXERCISES: CPT | Performed by: PHYSICAL THERAPIST
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
## 2021-07-06 ENCOUNTER — APPOINTMENT (OUTPATIENT)
Dept: PHYSICAL THERAPY | Facility: CLINIC | Age: 51
End: 2021-07-06
Payer: COMMERCIAL

## 2021-07-07 ENCOUNTER — OFFICE VISIT (OUTPATIENT)
Dept: PHYSICAL THERAPY | Facility: CLINIC | Age: 51
End: 2021-07-07
Payer: COMMERCIAL

## 2021-07-07 DIAGNOSIS — M54.2 NECK PAIN, ACUTE: Primary | ICD-10-CM

## 2021-07-07 DIAGNOSIS — M25.511 ACUTE PAIN OF RIGHT SHOULDER: ICD-10-CM

## 2021-07-07 PROCEDURE — 97110 THERAPEUTIC EXERCISES: CPT | Performed by: PHYSICAL THERAPIST

## 2021-07-07 NOTE — PROGRESS NOTES
Daily Note     Today's date: 2021  Patient name: Lisset Koenig  : 1970  MRN: [id number]  Referring provider: Caitlin Echevarria MD  Dx:   Encounter Diagnosis     ICD-10-CM    1  Neck pain, acute  M54 2    2  Acute pain of right shoulder  M25 511                   Subjective: Pt notes that the HEP did cause some soreness, but has been feeling a little better  Objective: See treatment diary below      Assessment: Tolerated treatment well  Pt needed moderate cueing for proper form and intensity throughout session  She noted improved pain levels and ROM in her shoulder and neck, but continues to have some pain in her mid back with movement  She states that her LB has been very painful the last few days out of the blue  Patient demonstrated fatigue post treatment and would benefit from continued PT to address current limitations  Plan: Continue per plan of care  Precautions: Fibro, HTN, anxiety        Daily Treatment Diary:      Initial Evaluation Date:2021  Compliance                    Visit Number 1  2                   Re-Eval  IE  -                MC   Foto Captured Y  -                                                  Manual                        C/S STM    arb                    R shoulder ROM     arb                                           Ther-Ex                       Pullys   5'            Pendulums   -                   Wand flexion   15x                   Wand ER   held p!                    Wand IR   -                   Self caudal dist   -                    C/S ROM- 4 ways  HEP  10x ea                    UT stretch  HEP  4x20" ea                    LS stretch   4x20"                   Shoulder rolls  HEP 10x ea           Shoulder retraction  HEP 15x           Shoulder cane chest press  HEP 15x                                                                                     Neuro Re-Ed                       S/l ER   -                   S/l scaptions   -                                                                                           Ther-Act                                                                                                Modalities                       CP prn                       MH prn  10' post neck   -

## 2021-07-08 ENCOUNTER — OFFICE VISIT (OUTPATIENT)
Dept: PHYSICAL THERAPY | Facility: CLINIC | Age: 51
End: 2021-07-08
Payer: COMMERCIAL

## 2021-07-08 DIAGNOSIS — M25.511 ACUTE PAIN OF RIGHT SHOULDER: ICD-10-CM

## 2021-07-08 DIAGNOSIS — M54.2 NECK PAIN, ACUTE: Primary | ICD-10-CM

## 2021-07-08 PROCEDURE — 97140 MANUAL THERAPY 1/> REGIONS: CPT | Performed by: PHYSICAL THERAPIST

## 2021-07-08 PROCEDURE — 97110 THERAPEUTIC EXERCISES: CPT | Performed by: PHYSICAL THERAPIST

## 2021-07-08 NOTE — PROGRESS NOTES
Daily Note     Today's date: 2021  Patient name: Dominique Vasquez  : 1970  MRN: [id number]  Referring provider: Adan Little MD  Dx:   Encounter Diagnosis     ICD-10-CM    1  Neck pain, acute  M54 2    2  Acute pain of right shoulder  M25 511                   Subjective: Pt reports feeling a little better this morning  She notes that her back is still bothering her the most        Objective: See treatment diary below      Assessment: Tolerated treatment well  Exercises were less difficulty this morning and not as painful for  She continues to have tightness in neck musculature, but is improving  Added SL ER exercise, she noted some pain in anterior shoulder, but was able to perform  Patient demonstrated fatigue post treatment and would benefit from continued PT to address current limitations  Plan: Continue per plan of care  Precautions: Fibro, HTN, anxiety        Daily Treatment Diary:      Initial Evaluation Date:2021  Compliance                  Visit Number 1  2  3                 Re-Eval  IE  -  -              MC   Foto Captured Y  -  -                                             Manual                        C/S STM    arb  arb                  R shoulder ROM     arb  arb                                         Ther-Ex                       Pullys   5'  5'          Pendulums   -                   Wand flexion   15x  20x                 Wand ER   held p!  -                 Finger ladder   10x flex            Wand IR   -  -                  C/S ROM- 4 ways  HEP  10x ea  10x ea                  UT stretch  HEP  4x20" ea  4x20" ea                  LS stretch   4x20"  3x30" ea                 Shoulder rolls  HEP 10x ea 15x ea          Shoulder retraction  HEP 15x 2x10 3"          Shoulder cane chest press  HEP 15x 2x10                                                                                    Neuro Re-Ed                       S/l ER   -  15x S/l scaptions   -                                                                                           Ther-Act                                                                                                Modalities                       CP prn                       MH prn  10' post neck   -

## 2021-07-12 ENCOUNTER — OFFICE VISIT (OUTPATIENT)
Dept: PHYSICAL THERAPY | Facility: CLINIC | Age: 51
End: 2021-07-12
Payer: COMMERCIAL

## 2021-07-12 DIAGNOSIS — M54.2 NECK PAIN, ACUTE: Primary | ICD-10-CM

## 2021-07-12 DIAGNOSIS — M25.511 ACUTE PAIN OF RIGHT SHOULDER: ICD-10-CM

## 2021-07-12 PROCEDURE — 97110 THERAPEUTIC EXERCISES: CPT | Performed by: PHYSICAL THERAPIST

## 2021-07-12 NOTE — PROGRESS NOTES
Daily Note     Today's date: 2021  Patient name: Cordell Roblero  : 1970  MRN: [id number]  Referring provider: Jaron Reis MD  Dx:   Encounter Diagnosis     ICD-10-CM    1  Neck pain, acute  M54 2    2  Acute pain of right shoulder  M25 511                   Subjective: Pt notes that she is feeling a little better, states that she continues to have some tightness in her neck and down the middle of her back  Objective: See treatment diary below      Assessment: Tolerated treatment well  Held a few exercises today, pt had to leave by a certain time this morning  Will resume exercises nv  Pt's shoulder ROM and neck ROM was improved from last week  She had increased muscle restrictions in R UT musculature, noted relief following manual work  Patient exhibited good technique with therapeutic exercises and would benefit from continued PT to address current deficits  Plan: Continue per plan of care  Precautions: Fibro, HTN, anxiety        Daily Treatment Diary:      Initial Evaluation Date:2021  Compliance                Visit Number 1  2  3  4               Re-Eval  IE  -  -  -            MC   Foto Captured Y  -  -  -                                         Manual                        C/S STM    arb  arb  arb                R shoulder ROM     arb  arb  -                                       Ther-Ex                       Pullys   5'  5' 5'         Pendulums   -                   Wand flexion   15x  20x  15x seated                Wand ER   held p!  -                 Finger ladder   10x flex   -         Wand IR   -  -                  C/S ROM- 4 ways  HEP  10x ea  10x ea  15x ea                UT stretch  HEP  4x20" ea  4x20" ea  4x20" ea                LS stretch   4x20"  3x30" ea  3x30"               Shoulder rolls  HEP 10x ea 15x ea 15x ea         Shoulder retraction  HEP 15x 2x10 3" 2x10 3"         Shoulder cane chest press  HEP 15x 2x10 -         Pball roll out     10x10"                                                                      Neuro Re-Ed                       S/l ER   -  15x   -               S/l scaptions   -                                                                                           Ther-Act                                                                                                Modalities                       CP prn                       MH prn  10' post neck   -

## 2021-07-15 ENCOUNTER — APPOINTMENT (OUTPATIENT)
Dept: PHYSICAL THERAPY | Facility: CLINIC | Age: 51
End: 2021-07-15
Payer: COMMERCIAL

## 2021-07-19 ENCOUNTER — OFFICE VISIT (OUTPATIENT)
Dept: PHYSICAL THERAPY | Facility: CLINIC | Age: 51
End: 2021-07-19
Payer: COMMERCIAL

## 2021-07-19 DIAGNOSIS — M54.2 NECK PAIN, ACUTE: Primary | ICD-10-CM

## 2021-07-19 DIAGNOSIS — M25.511 ACUTE PAIN OF RIGHT SHOULDER: ICD-10-CM

## 2021-07-19 PROCEDURE — 97110 THERAPEUTIC EXERCISES: CPT | Performed by: PHYSICAL THERAPIST

## 2021-07-19 PROCEDURE — 97140 MANUAL THERAPY 1/> REGIONS: CPT | Performed by: PHYSICAL THERAPIST

## 2021-07-19 NOTE — PROGRESS NOTES
Daily Note     Today's date: 2021  Patient name: Sebastian Mcnamara  : 1970  MRN: [id number]  Referring provider: Funmi London MD  Dx:   Encounter Diagnosis     ICD-10-CM    1  Neck pain, acute  M54 2    2  Acute pain of right shoulder  M25 511                   Subjective: Pt notes that she has a lot of soreness in her shoulder and neck this morning  Objective: See treatment diary below      Assessment: Tolerated treatment well  Pt continues to need moderate cueing for proper form and intensity  Tried UBE today, pt noted increased pain in shoulders, will try again next visit  Tightness in UT worse on R today, she noted some relief following session  Pt notes that she has been under a lot of stress which could be contributing to current pain  PT educated pt on importance of continuing HEP at home, she verbalized understanding  Patient demonstrated fatigue post treatment and would benefit from continued PT to address current limitations  Plan: Continue per plan of care  Precautions: Fibro, HTN, anxiety        Daily Treatment Diary:      Initial Evaluation Date:2021  Compliance              Visit Number 1  2  3  4  5             Re-Eval  IE  -  -  -  -          MC   Foto Captured Y  -  -  -  -                                     Manual                        C/S STM    arb  arb  arb  arb- UT on R 10'              R shoulder ROM     arb  arb  -                                       Ther-Ex                       Pullys   5'  5' 5' 5'        Pendulums   -                   Wand flexion   15x  20x  15x seated   15x ea seated             Wand ER   held p!  -                 Finger ladder   10x flex  - 10x flex           Wand IR   -  -                  C/S ROM- 4 ways  HEP  10x ea  10x ea  15x ea  15x ea              UT stretch  HEP  4x20" ea  4x20" ea  4x20" ea  4x20" ea              LS stretch   4x20"  3x30" ea  3x30"  3x30" Shoulder rolls  HEP 10x ea 15x ea 15x ea 15x ea        Shoulder retraction  HEP 15x 2x10 3" 2x10 3" 2x10 3"        Shoulder cane chest press  HEP 15x 2x10 -         Pball roll out     10x10" 10x10"        Wall slides     10x flex                                                           Neuro Re-Ed                       S/l ER   -  15x   -  15x              S/l scaptions   -                                                                                           Ther-Act                                                                                                Modalities                       CP prn                       MH prn  10' post neck   -

## 2021-07-21 ENCOUNTER — OFFICE VISIT (OUTPATIENT)
Dept: PHYSICAL THERAPY | Facility: CLINIC | Age: 51
End: 2021-07-21
Payer: COMMERCIAL

## 2021-07-21 DIAGNOSIS — M54.2 NECK PAIN, ACUTE: Primary | ICD-10-CM

## 2021-07-21 DIAGNOSIS — M25.511 ACUTE PAIN OF RIGHT SHOULDER: ICD-10-CM

## 2021-07-21 PROCEDURE — 97110 THERAPEUTIC EXERCISES: CPT | Performed by: PHYSICAL THERAPIST

## 2021-07-21 PROCEDURE — 97140 MANUAL THERAPY 1/> REGIONS: CPT | Performed by: PHYSICAL THERAPIST

## 2021-07-21 NOTE — PROGRESS NOTES
Daily Note/ DC Summary      Today's date: 2021  Patient name: Liset Iqbal  : 1970  MRN: [id number]  Referring provider: Libia Rios MD  Dx:   Encounter Diagnosis     ICD-10-CM    1  Neck pain, acute  M54 2    2  Acute pain of right shoulder  M25 511                   Subjective: Pt notes feeling stiff in R shoulder and neck, but feels improvement in her symptoms  Objective: See treatment diary below      Assessment: Tolerated treatment well  Introduced thoracic extension over the chair today, she noted relief in her upper back  Pt was able to perform all exercise with less difficulty today  ROM in shoulder was improved, she continues to need some cueing for proper form during C/S AROM  Patient exhibited good technique with therapeutic exercises and would benefit from continued PT to address current deficits  Plan: Continue per plan of care  Precautions: Fibro, HTN, anxiety        Daily Treatment Diary:      Initial Evaluation Date:2021  Compliance            Visit Number 1  2  3  4  5  6           Re-Eval  IE  -  -  -  -  -        PAULETTE Grewalto Captured Y  -  -  -  -  Y                                 Manual                        C/S STM    arb  arb  arb  arb- UT on R 10'  arb UT on R 10'             R shoulder ROM     arb  arb  -                                       Ther-Ex                       Pullys   5'  5' 5' 5' 5'       Pendulums   -                   Wand flexion   15x  20x  15x seated   15x ea seated  2x10 seated            Wand ER   held p!  -                 Finger ladder   10x flex  - 10x flex  15x flex         Wand IR   -  -                  C/S ROM- 4 ways  HEP  10x ea  10x ea  15x ea  15x ea  15x ea            UT stretch  HEP  4x20" ea  4x20" ea  4x20" ea  4x20" ea 4x20" ea            LS stretch   4x20"  3x30" ea  3x30"  3x30"  3x30"           Shoulder rolls  HEP 10x ea 15x ea 15x ea 15x ea 15x ea       Shoulder retraction  HEP 15x 2x10 3" 2x10 3" 2x10 3" Red TB 2x10 3"       Shoulder cane chest press  HEP 15x 2x10 -         Pball roll out     10x10" 10x10" 10x10"       Wall slides     10x flex  2x10 flex  thoracic ext  Over towel roll             10x10"                                   Neuro Re-Ed                       S/l ER   -  15x   -  15x   -           S/l scaptions   -                                                                                           Ther-Act                                                                                                Modalities                       CP prn                       MH prn  10' post neck   -                                                    DC Summary: Pt has other family issues going on and has to take care of her mother right now  She states that it is too much to come to therapy, but she will continue a HEP on her own  At this time, pt is a self DC from therapy  Will call the clinic with any future questions or concerns      Christiano Moody, PT

## 2021-07-22 ENCOUNTER — APPOINTMENT (OUTPATIENT)
Dept: PHYSICAL THERAPY | Facility: CLINIC | Age: 51
End: 2021-07-22
Payer: COMMERCIAL

## 2021-07-26 ENCOUNTER — APPOINTMENT (OUTPATIENT)
Dept: PHYSICAL THERAPY | Facility: CLINIC | Age: 51
End: 2021-07-26
Payer: COMMERCIAL

## 2021-07-29 ENCOUNTER — APPOINTMENT (OUTPATIENT)
Dept: PHYSICAL THERAPY | Facility: CLINIC | Age: 51
End: 2021-07-29
Payer: COMMERCIAL

## 2021-08-07 ENCOUNTER — OFFICE VISIT (OUTPATIENT)
Dept: URGENT CARE | Facility: CLINIC | Age: 51
End: 2021-08-07
Payer: COMMERCIAL

## 2021-08-07 VITALS
TEMPERATURE: 97.6 F | HEIGHT: 62 IN | SYSTOLIC BLOOD PRESSURE: 187 MMHG | HEART RATE: 65 BPM | WEIGHT: 205 LBS | DIASTOLIC BLOOD PRESSURE: 98 MMHG | BODY MASS INDEX: 37.73 KG/M2 | RESPIRATION RATE: 18 BRPM | OXYGEN SATURATION: 98 %

## 2021-08-07 DIAGNOSIS — J30.2 SEASONAL ALLERGIES: Primary | ICD-10-CM

## 2021-08-07 DIAGNOSIS — G44.209 TENSION HEADACHE: ICD-10-CM

## 2021-08-07 LAB — GLUCOSE SERPL-MCNC: 97 MG/DL (ref 65–140)

## 2021-08-07 PROCEDURE — 82948 REAGENT STRIP/BLOOD GLUCOSE: CPT

## 2021-08-07 PROCEDURE — 99213 OFFICE O/P EST LOW 20 MIN: CPT | Performed by: EMERGENCY MEDICINE

## 2021-08-07 RX ORDER — FLUTICASONE PROPIONATE 50 MCG
1 SPRAY, SUSPENSION (ML) NASAL DAILY
Qty: 1 G | Refills: 1 | Status: SHIPPED | OUTPATIENT
Start: 2021-08-07 | End: 2021-08-21

## 2021-08-07 RX ORDER — METHOCARBAMOL 500 MG/1
500 TABLET, FILM COATED ORAL 4 TIMES DAILY
Qty: 30 TABLET | Refills: 0 | Status: SHIPPED | OUTPATIENT
Start: 2021-08-07

## 2021-08-07 NOTE — PROGRESS NOTES
[address] [facility]        NAME: Abdi James is a 46 y o  female  : 1970    MRN: [id number]  DATE: 2021  TIME: 9:36 AM    Assessment and Plan   Seasonal allergies [J30 2]  1  Seasonal allergies  fluticasone (FLONASE) 50 mcg/act nasal spray   2  Tension headache  methocarbamol (ROBAXIN) 500 mg tablet         Patient Instructions     Patient Instructions   Acute Headache   WHAT YOU NEED TO KNOW:   An acute headache is pain or discomfort that starts suddenly and gets worse quickly  You may have an acute headache only when you feel stress or eat certain foods  Other acute headache pain can happen every day, and sometimes several times a day  DISCHARGE INSTRUCTIONS:   Return to the emergency department if:   · You have severe pain  · You have numbness or weakness on one side of your face or body  · You have a headache that occurs after a blow to the head, a fall, or other trauma  · You have a headache, are forgetful or confused, or have trouble speaking  · You have a headache, stiff neck, and a fever  Contact your healthcare provider if:   · You have a constant headache and are vomiting  · You have a headache each day that does not get better, even after treatment  · You have changes in your headaches, or new symptoms that occur when you have a headache  · You have questions or concerns about your condition or care  Medicines: You may need any of the following:  · Prescription pain medicine  may be given  The medicine your healthcare provider recommends will depend on the kind of headaches you have  You will need to take prescription headache medicines as directed to prevent a problem called rebound headache  These headaches happen with regular use of pain relievers for headache disorders  · NSAIDs , such as ibuprofen, help decrease swelling, pain, and fever  This medicine is available with or without a doctor's order   NSAIDs can cause stomach bleeding or kidney problems in certain people  If you take blood thinner medicine, always ask your healthcare provider if NSAIDs are safe for you  Always read the medicine label and follow directions  · Acetaminophen  decreases pain and fever  It is available without a doctor's order  Ask how much to take and how often to take it  Follow directions  Read the labels of all other medicines you are using to see if they also contain acetaminophen, or ask your doctor or pharmacist  Acetaminophen can cause liver damage if not taken correctly  Do not use more than 3 grams (3,000 milligrams) total of acetaminophen in one day  · Antidepressants  may be given for some kinds of headaches  · Take your medicine as directed  Contact your healthcare provider if you think your medicine is not helping or if you have side effects  Tell him or her if you are allergic to any medicine  Keep a list of the medicines, vitamins, and herbs you take  Include the amounts, and when and why you take them  Bring the list or the pill bottles to follow-up visits  Carry your medicine list with you in case of an emergency  Manage your symptoms:   · Apply heat or ice  on the headache area  Use a heat or ice pack  For an ice pack, you can also put crushed ice in a plastic bag  Cover the pack or bag with a towel before you apply it to your skin  Ice and heat both help decrease pain, and heat also helps decrease muscle spasms  Apply heat for 20 to 30 minutes every 2 hours  Apply ice for 15 to 20 minutes every hour  Apply heat or ice for as long and for as many days as directed  You may alternate heat and ice  · Relax your muscles  Lie down in a comfortable position and close your eyes  Relax your muscles slowly  Start at your toes and work your way up your body  · Keep a record of your headaches  Write down when your headaches start and stop  Include your symptoms and what you were doing when the headache began   Record what you ate or drank for 24 hours before the headache started  Describe the pain and where it hurts  Keep track of what you did to treat your headache and if it worked  Prevent an acute headache:   · Avoid anything that triggers an acute headache  Examples include exposure to chemicals, going to high altitude, or not getting enough sleep  Create a regular sleep routine  Go to sleep at the same time and wake up at the same time each day  Do not use electronic devices before bedtime  These may trigger a headache or prevent you from sleeping well  · Do not smoke  Nicotine and other chemicals in cigarettes and cigars can trigger an acute headache or make it worse  Ask your healthcare provider for information if you currently smoke and need help to quit  E-cigarettes or smokeless tobacco still contain nicotine  Talk to your healthcare provider before you use these products  · Limit alcohol as directed  Alcohol can trigger an acute headache or make it worse  If you have cluster headaches, do not drink alcohol during an episode  For other types of headaches, ask your healthcare provider if it is safe for you to drink alcohol  Ask how much is safe for you to drink, and how often  · Exercise as directed  Exercise can reduce tension and help with headache pain  Aim for 30 minutes of physical activity on most days of the week  Your healthcare provider can help you create an exercise plan  · Eat a variety of healthy foods  Healthy foods include fruits, vegetables, low-fat dairy products, lean meats, fish, whole grains, and cooked beans  Your healthcare provider or dietitian can help you create meals plans if you need to avoid foods that trigger headaches  Follow up with your healthcare provider as directed:  Bring your headache record with you when you see your healthcare provider  Write down your questions so you remember to ask them during your visits    © Copyright [employer] 2021 Information is for End User's use only and may not be sold, redistributed or otherwise used for commercial purposes  All illustrations and images included in CareNotes® are the copyrighted property of [employer]  or [employer]  The above information is an  only  It is not intended as medical advice for individual conditions or treatments  Talk to your doctor, nurse or pharmacist before following any medical regimen to see if it is safe and effective for you  Tension Headache   WHAT YOU NEED TO KNOW:   Tension headaches are most often caused by stress, eye strain, or muscle tightness  The pain of a tension headache may start in the forehead or the back of the head  The pain often spreads over the whole head and down into the neck and shoulders  Over-the-counter pain medicine is the most useful and common treatment for a tension headache  Exercise, biofeedback, meditation, or relaxation techniques may also decrease your headache pain  DISCHARGE INSTRUCTIONS:   Call your local emergency number (911 in the [address] [facility]) if:   · You have difficulty seeing, speaking, or moving  · You have a seizure  Call your doctor if:   · You pass out or become confused  · You have a sudden headache that seems different or much worse than your usual headaches  · You have a headache, fever, and a stiff neck  · Your headaches continue to get worse  · Your headaches happen so often that they affect your ability to do your work or normal activities  · You need to take medicine to help your headaches more often than your healthcare provider says you should  · Your headaches get so bad that they cause you to vomit  · You have questions or concerns about your condition or care  Medicines:   · NSAIDs , such as ibuprofen, help decrease swelling, pain, and fever  This medicine is available with or without a doctor's order  NSAIDs can cause stomach bleeding or kidney problems in certain people   If you take blood thinner medicine, always ask your healthcare provider if NSAIDs are safe for you  Always read the medicine label and follow directions  · Acetaminophen  decreases pain and fever  It is available without a doctor's order  Ask how much to take and how often to take it  Follow directions  Read the labels of all other medicines you are using to see if they also contain acetaminophen, or ask your doctor or pharmacist  Acetaminophen can cause liver damage if not taken correctly  Do not use more than 4 grams (4,000 milligrams) total of acetaminophen in one day  · Take your medicine as directed  Contact your healthcare provider if you think your medicine is not helping or if you have side effects  Tell him of her if you are allergic to any medicine  Keep a list of the medicines, vitamins, and herbs you take  Include the amounts, and when and why you take them  Bring the list or the pill bottles to follow-up visits  Carry your medicine list with you in case of an emergency  Manage your symptoms:   · Keep a headache record  Include when the headaches start and stop and what made them better  Describe your symptoms, such as how the pain feels, where it is, and how bad it is  Record anything you ate or drank for the past 24 hours before your headache  Bring this to follow-up visits  · Apply heat as directed  Heat may help decrease headache pain and muscle spasms  Apply heat on the area for 20 to 30 minutes every 2 hours for as many days as directed  A warm bath may also help relieve muscle tension and spasms  · Apply ice as directed  Ice may help decrease headache pain  Use an ice pack or put crushed ice in a plastic bag  Cover it with a towel and place it on the area for 15 to 20 minutes every hour as directed  · Ask about spinal manipulation  This treatment combines movement, massage, exercise, and physical therapy  It can help relieve a tension headache, and may be able to prevent another if done regularly   Talk to your record with you  Write down your questions so you remember to ask them during your visits  © Copyright [email or web address] 2021 Information is for End User's use only and may not be sold, redistributed or otherwise used for commercial purposes  All illustrations and images included in CareNotes® are the copyrighted property of [employer]  or [employer]a Bennett  The above information is an  only  It is not intended as medical advice for individual conditions or treatments  Talk to your doctor, nurse or pharmacist before following any medical regimen to see if it is safe and effective for you  Follow up with PCP in 3-5 days  Proceed to  ER if symptoms worsen  Chief Complaint     Chief Complaint   Patient presents with    Migraine     Light sensitivity and Pressure          History of Present Illness       Patient complains of non throbbing bi occipital headache for the past 3 days  Onset after she checked her blood sugar using her diabetic mother's device and obtained a reading of 230 after she ate lunch  She also is concerned as her brother was just diagnosed with diabetes  She has long history of migraine but claims present headache is not like her typical migraine headache  She has sumatriptan at home but is afraid to take it as last time she tried it it caused some swelling of her lower lip  She is presently receiving physical therapy for her right shoulder and neck  She has long history of seasonal allergies which are active at this time of year  She also complains of nasal congestion and sinus pressure  Review of Systems   Review of Systems   Constitutional: Negative for activity change, chills, fatigue and fever  HENT: Positive for congestion and sinus pressure  Negative for sore throat  Eyes: Negative for photophobia  Respiratory: Negative for shortness of breath  Gastrointestinal: Negative for abdominal pain, blood in stool, nausea and vomiting     Endocrine: Negative for cold intolerance, heat intolerance, polydipsia, polyphagia and polyuria  Genitourinary: Negative for hematuria  Musculoskeletal: Negative for arthralgias, back pain, joint swelling, myalgias, neck pain and neck stiffness  Skin: Negative for color change, rash and wound  Neurological: Positive for headaches  Negative for dizziness, syncope, weakness and light-headedness  Psychiatric/Behavioral: The patient is nervous/anxious  Current Medications       Current Outpatient Medications:     amLODIPine (NORVASC) 10 mg tablet, Take 10 mg by mouth daily, Disp: , Rfl:     atenolol (TENORMIN) 25 mg tablet, TAKE 1 TABLET (25 MG TOTAL) BY MOUTH DAILY   CANCEL CLONIDINE, Disp: , Rfl: 0    buPROPion (WELLBUTRIN XL) 150 mg 24 hr tablet, Take 150 mg by mouth, Disp: , Rfl:     lovastatin (MEVACOR) 10 MG tablet, Take 10 mg by mouth, Disp: , Rfl:     pramipexole (MIRAPEX) 1 mg tablet, Take 0 25 mg by mouth 3 (three) times a day, Disp: , Rfl:     SUMAtriptan (IMITREX) 100 mg tablet, Take 100 mg by mouth once as needed for migraine, Disp: , Rfl:     albuterol (ProAir HFA) 90 mcg/act inhaler, Inhale 2 puffs every 6 (six) hours as needed for shortness of breath Dispense 1 spacer (Patient not taking: Reported on 8/7/2021), Disp: 8 5 g, Rfl: 0    cloNIDine (CATAPRES) 0 2 mg tablet, Take 0 2 mg by mouth daily at bedtime (Patient not taking: Reported on 8/7/2021), Disp: , Rfl:     cyclobenzaprine (FLEXERIL) 10 mg tablet, Take 1 tablet (10 mg total) by mouth 3 (three) times a day as needed for muscle spasms (Patient not taking: Reported on 8/7/2021), Disp: 20 tablet, Rfl: 0    Fesoterodine Fumarate ER 4 MG TB24, Take 1 tablet (4 mg total) by mouth daily at bedtime (Patient not taking: Reported on 8/7/2021), Disp: 30 tablet, Rfl: 1    fluticasone (FLONASE) 50 mcg/act nasal spray, 1 spray into each nostril daily for 14 days, Disp: 1 g, Rfl: 1    gabapentin (NEURONTIN) 600 MG tablet, TAKE 1 Tablet BY MOUTH TWICE DAILY FOR PAIN AND SLEEP, Disp: , Rfl: 0    hydroxychloroquine (PLAQUENIL) 200 mg tablet, Take 400 mg by mouth daily, Disp: , Rfl: 5    hydrOXYzine HCL (ATARAX) 25 mg tablet, Take 25 mg by mouth every 6 (six) hours as needed for itching (Patient not taking: Reported on 8/7/2021), Disp: , Rfl:     ketotifen (ZADITOR) 0 025 % ophthalmic solution, Administer 1 drop into the left eye 2 (two) times a day (Patient not taking: Reported on 9/21/2020), Disp: 5 mL, Rfl: 0    meloxicam (MOBIC) 7 5 mg tablet, Take 7 5 mg by mouth, Disp: , Rfl:     metFORMIN (GLUCOPHAGE-XR) 500 mg 24 hr tablet, Take 1 tablet by mouth, Disp: , Rfl:     methocarbamol (ROBAXIN) 500 mg tablet, Take 1 tablet (500 mg total) by mouth 3 (three) times a day (Patient not taking: Reported on 6/11/2021), Disp: 20 tablet, Rfl: 0    methocarbamol (ROBAXIN) 500 mg tablet, Take 1 tablet (500 mg total) by mouth 4 (four) times a day, Disp: 30 tablet, Rfl: 0    montelukast (SINGULAIR) 10 mg tablet, Take 10 mg by mouth daily at bedtime, Disp: , Rfl:     omeprazole (PriLOSEC) 10 mg delayed release capsule, Take 20 mg by mouth, Disp: , Rfl:     pantoprazole (PROTONIX) 20 mg tablet, Take 20 mg by mouth daily (Patient not taking: Reported on 8/7/2021), Disp: , Rfl:     pramipexole (MIRAPEX) 1 mg tablet, Take 1 mg by mouth (Patient not taking: Reported on 8/7/2021), Disp: , Rfl:     predniSONE 10 mg tablet, Take 6 pills day one, 5 pills day 2, 4 pills day 3, 3 pills day 4, 2 pills day 5, and 1 pill day 6   (Patient not taking: Reported on 8/22/2020), Disp: 21 tablet, Rfl: 0    predniSONE 10 mg tablet, Take once daily all days pills on this schedule 6- 6- 5- 4- 3- 2- 1 (Patient not taking: Reported on 6/11/2021), Disp: 27 tablet, Rfl: 0    predniSONE 10 mg tablet, Take once daily all days pills on this schedule 6- 6- 5- 4- 3- 2- 1 (Patient not taking: Reported on 6/11/2021), Disp: 27 tablet, Rfl: 0    traMADol (ULTRAM) 50 mg tablet, Take 1 tablet (50 mg total) by mouth every 6 (six) hours as needed for moderate pain (Patient not taking: Reported on 8/7/2021), Disp: 8 tablet, Rfl: 0    venlafaxine (EFFEXOR-XR) 75 mg 24 hr capsule, Take 75 mg by mouth (Patient not taking: Reported on 8/7/2021), Disp: , Rfl:     Current Allergies     Allergies as of 08/07/2021 - Reviewed 08/07/2021   Allergen Reaction Noted    Dog epithelium Swelling 11/14/2019    Ibuprofen Swelling 03/15/2018    Lisinopril Swelling 03/15/2018    Red dye - food allergy Swelling 03/15/2018    Latex Rash 03/15/2018            The following portions of the patient's history were reviewed and updated as appropriate: allergies, current medications, past family history, past medical history, past social history, past surgical history and problem list      Past Medical History:   Diagnosis Date    Angioedema     Anxiety     Arthritis     Diverticulitis     Hypertension     Migraine     Overactive bladder     Restless leg        Past Surgical History:   Procedure Laterality Date    ADENOIDECTOMY      BARIATRIC SURGERY      CARPAL TUNNEL RELEASE  01/07/2021    BL    HYSTERECTOMY      TONSILLECTOMY         Family History   Problem Relation Age of Onset    Diabetes Mother     Hypertension Mother     Heart disease Father     Hypertension Father          Medications have been verified  Objective   BP (!) 187/98   Pulse 65   Temp 97 6 °F (36 4 °C)   Resp 18   Ht 5' 2" (1 575 m)   Wt 93 kg (205 lb)   SpO2 98%   BMI 37 49 kg/m²        Physical Exam     Physical Exam  Vitals and nursing note reviewed  Constitutional:       General: She is not in acute distress  Appearance: She is well-developed  HENT:      Head: Normocephalic and atraumatic  Right Ear: Tympanic membrane and external ear normal       Left Ear: Tympanic membrane and external ear normal       Nose: Mucosal edema present        Mouth/Throat:      Pharynx: No oropharyngeal exudate or posterior oropharyngeal erythema  Tonsils: No tonsillar abscesses  Eyes:      Conjunctiva/sclera: Conjunctivae normal       Pupils: Pupils are equal, round, and reactive to light  Funduscopic exam:     Right eye: No hemorrhage or papilledema  Left eye: No hemorrhage or papilledema  Comments: Discs flat with sharp borders, no hemorrhages or exudates  Cardiovascular:      Rate and Rhythm: Normal rate and regular rhythm  Pulmonary:      Effort: Pulmonary effort is normal    Musculoskeletal:      Cervical back: Neck supple  Skin:     General: Skin is warm and dry  Neurological:      Mental Status: She is alert and oriented to person, place, and time  Psychiatric:         Mood and Affect: Mood normal          Behavior: Behavior normal          Thought Content:  Thought content normal          Judgment: Judgment normal

## 2022-06-27 ENCOUNTER — OFFICE VISIT (OUTPATIENT)
Dept: URGENT CARE | Facility: CLINIC | Age: 52
End: 2022-06-27
Payer: COMMERCIAL

## 2022-06-27 VITALS
HEIGHT: 62 IN | TEMPERATURE: 97 F | HEART RATE: 92 BPM | RESPIRATION RATE: 16 BRPM | WEIGHT: 205 LBS | BODY MASS INDEX: 37.73 KG/M2 | OXYGEN SATURATION: 100 %

## 2022-06-27 DIAGNOSIS — R21 RASH: Primary | ICD-10-CM

## 2022-06-27 PROCEDURE — 99213 OFFICE O/P EST LOW 20 MIN: CPT | Performed by: EMERGENCY MEDICINE

## 2022-06-27 RX ORDER — EPINEPHRINE 0.3 MG/.3ML
INJECTION SUBCUTANEOUS
COMMUNITY

## 2022-06-27 RX ORDER — PREDNISONE 10 MG/1
TABLET ORAL
Qty: 27 TABLET | Refills: 0 | Status: SHIPPED | OUTPATIENT
Start: 2022-06-27

## 2022-06-27 RX ORDER — NALTREXONE HYDROCHLORIDE AND BUPROPION HYDROCHLORIDE 8; 90 MG/1; MG/1
TABLET, EXTENDED RELEASE ORAL
COMMUNITY
Start: 2022-06-07

## 2022-06-27 RX ORDER — ERGOCALCIFEROL 1.25 MG/1
CAPSULE ORAL
COMMUNITY
Start: 2022-05-02

## 2022-06-27 RX ORDER — METHYLPREDNISOLONE 4 MG/1
TABLET ORAL
COMMUNITY
Start: 2022-06-23

## 2022-06-27 NOTE — PROGRESS NOTES
[address]        NAME: Tyler Reis is a 46 y o  female  : 1970    MRN: [id number]  DATE: 2022  TIME: 2:35 PM    Assessment and Plan   Rash [R21]  1  Rash  predniSONE 10 mg tablet    permethrin (NIX) 1 % lotion     History suggests contact dermatitis but distribution of her rash could fit with scabies therefore will treat for both   Patient Instructions     Patient Instructions   Scabies   WHAT YOU NEED TO KNOW:   Scabies is a skin condition that is caused by scabies mites  Scabies mites are tiny bugs that burrow, lay eggs, and live underneath the skin  Scabies is spread through close contact with a person who has scabies  This includes having sex, sleeping in the same bed, or sharing towels or clothing  Scabies can spread quickly and must be treated as soon as it is found  DISCHARGE INSTRUCTIONS:   Return to the emergency department if:   · You develop a fever and red, swollen, painful areas on your skin  Contact your healthcare provider if:   · The bites become crusty or filled with pus  · You have worsening itching after scabies treatment  · You have new bite or burrow marks after treatment  · You have questions or concerns about your condition or care  Medicines:   · Prescription creams  are used to treat scabies  You will need to apply them over all of your body from the neck down  Do not swallow this medicine  An oral medication may be ordered if scabies is severe  · Take your medicine as directed  Contact your healthcare provider if you think your medicine is not helping or if you have side effects  Tell him or her if you are allergic to any medicine  Keep a list of the medicines, vitamins, and herbs you take  Include the amounts, and when and why you take them  Bring the list or the pill bottles to follow-up visits  Carry your medicine list with you in case of an emergency      Follow up with your doctor as directed:  Write down your questions so you remember to ask them during your visits  Prevent the spread of scabies:   · Have all family members use scabies medicine  Tell all sex partners and anyone who has shared your clothing or bed for the past month about the scabies  Tell them to ask their healthcare provider for scabies medicine even if they have no itching, rash, or burrow marks  · Wash all items that you have used since 3 days before you learned about your scabies  Use hot water to wash all clothing, bedding, and towels  Dry them for at least 20 minutes on the hot cycle of a dryer  Take items to be dry cleaned that cannot be washed in a washing machine  Place any clothing or bedding that cannot be washed or dry cleaned in a closed plastic bag for 1 week  · Do not have close body contact with anyone until the scabies mites are gone  Talk to your healthcare provider about how long you need to wait  Also ask about public places to avoid, such as the gym  Help relieve itching: Your skin may continue to itch for 2 or 3 weeks, even after the scabies mites are gone  Over-the-counter antihistamines or cortisone cream may help relieve itching  Trim your fingernails so you do not spread any mites that are still alive after treatment  Do not scratch your skin  Scratches may cause a skin infection  A cool bath may also help relieve the itching  Return to school or work: You may return to school or work 24 hours after using scabies medicine  © Copyright [employer] 2022 Information is for End User's use only and may not be sold, redistributed or otherwise used for commercial purposes  All illustrations and images included in CareNotes® are the copyrighted property of A D A M , Inc  or [facility] Jorge A Jacinto   The above information is an  only  It is not intended as medical advice for individual conditions or treatments   Talk to your doctor, nurse or pharmacist before following any medical regimen to see if it is safe and effective for you  Acute Rash   WHAT YOU NEED TO KNOW:   A rash is irritated, red, or itchy skin or mucus membranes, such as the lining of your nose or throat  Acute means the rash starts suddenly, worsens quickly, and lasts a short time  Common causes include a disease or infection, a reaction to something you are allergic to, or certain medicines  DISCHARGE INSTRUCTIONS:   Return to the emergency department if:   · You have sudden trouble breathing or chest pain  · You are vomiting, have a headache or muscle aches, and your throat hurts  Call your doctor or dermatologist if:   · You have a fever  · You get open wounds from scratching your skin, or you have a wound that is red, swollen, or painful  · Your rash lasts longer than 3 months  · You have swelling or pain in your joints  · You have questions or concerns about your condition or care  Medicines:  If your rash does not go away on its own, you may need the following medicines:  · Antihistamines  may be given to help decrease itching  · Steroids  may be given to decrease inflammation  · Antibiotics  help fight or prevent a bacterial infection  · Take your medicine as directed  Contact your healthcare provider if you think your medicine is not helping or if you have side effects  Tell him of her if you are allergic to any medicine  Keep a list of the medicines, vitamins, and herbs you take  Include the amounts, and when and why you take them  Bring the list or the pill bottles to follow-up visits  Carry your medicine list with you in case of an emergency  Prevent a rash or care for your skin when you have a rash:  Dry skin can lead to more problems  Do not scratch your skin if it itches  You may cause a skin infection by scratching  The following may prevent dry skin, and help your skin look better:  · Help soothe your rash  Apply thick cream lotions or petroleum jelly  Cool compresses may also soothe your skin   Apply a cool compress or a cool, wet towel, and then cover it with a dry towel  · Use lukewarm water when you bathe  Hot water may damage your skin more  Pat your skin dry  Do not rub your skin with a towel  · Use detergents, soaps, shampoos, and bubble baths  made for sensitive skin  · Wear clothes made of cotton instead of nylon or wool  Cotton is softer, so it will not hurt your skin as much  Follow up with your healthcare providers as directed:  A dermatologist may help find the cause of your rash or help plan or change treatment  A dietitian may help with meal planning if you have a food allergy  Write down your questions so you remember to ask them during your visits  © Copyright [employer] 2022 Information is for End User's use only and may not be sold, redistributed or otherwise used for commercial purposes  All illustrations and images included in CareNotes® are the copyrighted property of A D A M , Inc  or [address] [place], [place] [address]anthony   The above information is an  only  It is not intended as medical advice for individual conditions or treatments  Talk to your doctor, nurse or pharmacist before following any medical regimen to see if it is safe and effective for you  Follow up with PCP in 3-5 days  Proceed to  ER if symptoms worsen  Chief Complaint     Chief Complaint   Patient presents with    Rash     Thinks she has scabies  Has a rash all over body  Rash is itchy but hurts also         History of Present Illness       Patient complains of pruritic rash in skin folds of upper extremities finger web spaces and under breasts for the past 2 days  Similar rash in past diagnosed as scabies  She states onset of rash after handling a baby rabbit that she found while cleaning a sae area under her porch  Her son who was helping her clean also has same rash  Review of Systems   Review of Systems   Constitutional: Negative for chills and fever  Respiratory: Negative for shortness of breath  Musculoskeletal: Negative for arthralgias, joint swelling, myalgias, neck pain and neck stiffness  Skin: Positive for color change and rash  Negative for wound  Neurological: Negative for dizziness and headaches  Current Medications       Current Outpatient Medications:     albuterol (ProAir HFA) 90 mcg/act inhaler, Inhale 2 puffs every 6 (six) hours as needed for shortness of breath Dispense 1 spacer, Disp: 8 5 g, Rfl: 0    amLODIPine (NORVASC) 10 mg tablet, Take 10 mg by mouth daily, Disp: , Rfl:     atenolol (TENORMIN) 25 mg tablet, TAKE 1 TABLET (25 MG TOTAL) BY MOUTH DAILY   CANCEL CLONIDINE, Disp: , Rfl: 0    buPROPion (WELLBUTRIN XL) 150 mg 24 hr tablet, Take 150 mg by mouth, Disp: , Rfl:     EPINEPHrine (EPIPEN) 0 3 mg/0 3 mL SOAJ, , Disp: , Rfl:     ergocalciferol (VITAMIN D2) 50,000 units, TAKE 1 CAPSULE BY MOUTH ONE TIME PER WEEK, Disp: , Rfl:     gabapentin (NEURONTIN) 600 MG tablet, TAKE 1 Tablet BY MOUTH TWICE DAILY FOR PAIN AND SLEEP, Disp: , Rfl: 0    hydrOXYzine HCL (ATARAX) 25 mg tablet, Take 25 mg by mouth every 6 (six) hours as needed for itching, Disp: , Rfl:     methocarbamol (ROBAXIN) 500 mg tablet, Take 1 tablet (500 mg total) by mouth 4 (four) times a day, Disp: 30 tablet, Rfl: 0    methylPREDNISolone 4 MG tablet therapy pack, TAKE 6 TABLETS ON DAY 1 AS DIRECTED ON PACKAGE AND DECREASE BY 1 TAB EACH DAY FOR A TOTAL OF 6 DAYS, Disp: , Rfl:     montelukast (SINGULAIR) 10 mg tablet, Take 10 mg by mouth daily at bedtime, Disp: , Rfl:     Naltrexone-buPROPion HCl ER (Contrave) 8-90 MG TB12, Week #1: 1 tab in morning, Week #2: 1 tab in the morning and 1 tab in afternoon, Week #3: 2 tab in morning and 1 tab in afternoon, Week #4 and thereafter: 2 tab in morning and 2 tab in afternoon, Disp: , Rfl:     omeprazole (PriLOSEC) 10 mg delayed release capsule, Take 20 mg by mouth, Disp: , Rfl:     permethrin (NIX) 1 % lotion, Apply topically once for 1 dose Shampoo, rinse and towel dry hair, saturate hair and scalp with permethrin  Rinse after 10 min; repeat in 1 week if needed, Disp: 59 mL, Rfl: 1    pramipexole (MIRAPEX) 1 mg tablet, Take 1 mg by mouth, Disp: , Rfl:     predniSONE 10 mg tablet, Take once daily all days pills on this schedule 6- 6- 5- 4- 3- 2- 1, Disp: 27 tablet, Rfl: 0    SUMAtriptan (IMITREX) 100 mg tablet, Take 100 mg by mouth once as needed for migraine, Disp: , Rfl:     cloNIDine (CATAPRES) 0 2 mg tablet, Take 0 2 mg by mouth daily at bedtime, Disp: , Rfl:     cyclobenzaprine (FLEXERIL) 10 mg tablet, Take 1 tablet (10 mg total) by mouth 3 (three) times a day as needed for muscle spasms, Disp: 20 tablet, Rfl: 0    Fesoterodine Fumarate ER 4 MG TB24, Take 1 tablet (4 mg total) by mouth daily at bedtime, Disp: 30 tablet, Rfl: 1    fluticasone (FLONASE) 50 mcg/act nasal spray, 1 spray into each nostril daily for 14 days, Disp: 1 g, Rfl: 1    hydroxychloroquine (PLAQUENIL) 200 mg tablet, Take 400 mg by mouth daily, Disp: , Rfl: 5    ketotifen (ZADITOR) 0 025 % ophthalmic solution, Administer 1 drop into the left eye 2 (two) times a day, Disp: 5 mL, Rfl: 0    lovastatin (MEVACOR) 10 MG tablet, Take 10 mg by mouth, Disp: , Rfl:     meloxicam (MOBIC) 7 5 mg tablet, Take 7 5 mg by mouth, Disp: , Rfl:     metFORMIN (GLUCOPHAGE-XR) 500 mg 24 hr tablet, Take 1 tablet by mouth, Disp: , Rfl:     methocarbamol (ROBAXIN) 500 mg tablet, Take 1 tablet (500 mg total) by mouth 3 (three) times a day (Patient not taking: Reported on 6/11/2021), Disp: 20 tablet, Rfl: 0    pantoprazole (PROTONIX) 20 mg tablet, Take 20 mg by mouth daily, Disp: , Rfl:     pramipexole (MIRAPEX) 1 mg tablet, Take 0 25 mg by mouth 3 (three) times a day, Disp: , Rfl:     predniSONE 10 mg tablet, Take 6 pills day one, 5 pills day 2, 4 pills day 3, 3 pills day 4, 2 pills day 5, and 1 pill day 6   (Patient not taking: Reported on 8/22/2020), Disp: 21 tablet, Rfl: 0    predniSONE 10 mg tablet, Take once daily all days pills on this schedule 6- 6- 5- 4- 3- 2- 1, Disp: 27 tablet, Rfl: 0    predniSONE 10 mg tablet, Take once daily all days pills on this schedule 6- 6- 5- 4- 3- 2- 1 (Patient not taking: Reported on 6/11/2021), Disp: 27 tablet, Rfl: 0    traMADol (ULTRAM) 50 mg tablet, Take 1 tablet (50 mg total) by mouth every 6 (six) hours as needed for moderate pain, Disp: 8 tablet, Rfl: 0    venlafaxine (EFFEXOR-XR) 75 mg 24 hr capsule, Take 75 mg by mouth, Disp: , Rfl:     Current Allergies     Allergies as of 06/27/2022 - Reviewed 06/27/2022   Allergen Reaction Noted    Dog epithelium Swelling 11/14/2019    Ibuprofen Swelling 03/15/2018    Lisinopril Swelling 03/15/2018    Red dye - food allergy Swelling 03/15/2018    Latex Rash 03/15/2018            The following portions of the patient's history were reviewed and updated as appropriate: allergies, current medications, past family history, past medical history, past social history, past surgical history and problem list      Past Medical History:   Diagnosis Date    Angioedema     Anxiety     Arthritis     Diverticulitis     Hypertension     Migraine     Overactive bladder     Restless leg        Past Surgical History:   Procedure Laterality Date    ADENOIDECTOMY      BARIATRIC SURGERY      CARPAL TUNNEL RELEASE  01/07/2021    BL    HYSTERECTOMY      TONSILLECTOMY         Family History   Problem Relation Age of Onset    Diabetes Mother     Hypertension Mother     Heart disease Father     Hypertension Father          Medications have been verified  Objective   Pulse 92   Temp (!) 97 °F (36 1 °C)   Resp 16   Ht 5' 2" (1 575 m)   Wt 93 kg (205 lb)   SpO2 100%   BMI 37 49 kg/m²        Physical Exam     Physical Exam  Vitals and nursing note reviewed  Constitutional:       General: She is not in acute distress  Appearance: She is well-developed  HENT:      Head: Normocephalic and atraumatic        Right Ear: Tympanic membrane normal       Left Ear: Tympanic membrane normal       Nose: Mucosal edema present  Mouth/Throat:      Pharynx: No oropharyngeal exudate or posterior oropharyngeal erythema  Tonsils: No tonsillar abscesses  Musculoskeletal:      Cervical back: Neck supple  Skin:     General: Skin is warm and dry  Findings: Rash present  No erythema  Comments: Erythematous, papular rash antecubital spaces bilaterally, wrist bilaterally, under breasts bilaterally  No vesicles, no linear distributions   Neurological:      Mental Status: She is alert and oriented to person, place, and time  Psychiatric:         Mood and Affect: Mood normal          Behavior: Behavior normal          Thought Content:  Thought content normal          Judgment: Judgment normal

## 2022-06-27 NOTE — PATIENT INSTRUCTIONS
Scabies   WHAT YOU NEED TO KNOW:   Scabies is a skin condition that is caused by scabies mites  Scabies mites are tiny bugs that burrow, lay eggs, and live underneath the skin  Scabies is spread through close contact with a person who has scabies  This includes having sex, sleeping in the same bed, or sharing towels or clothing  Scabies can spread quickly and must be treated as soon as it is found  DISCHARGE INSTRUCTIONS:   Return to the emergency department if:   You develop a fever and red, swollen, painful areas on your skin  Contact your healthcare provider if:   The bites become crusty or filled with pus  You have worsening itching after scabies treatment  You have new bite or burrow marks after treatment  You have questions or concerns about your condition or care  Medicines:   Prescription creams  are used to treat scabies  You will need to apply them over all of your body from the neck down  Do not swallow this medicine  An oral medication may be ordered if scabies is severe  Take your medicine as directed  Contact your healthcare provider if you think your medicine is not helping or if you have side effects  Tell him or her if you are allergic to any medicine  Keep a list of the medicines, vitamins, and herbs you take  Include the amounts, and when and why you take them  Bring the list or the pill bottles to follow-up visits  Carry your medicine list with you in case of an emergency  Follow up with your doctor as directed:  Write down your questions so you remember to ask them during your visits  Prevent the spread of scabies:   Have all family members use scabies medicine  Tell all sex partners and anyone who has shared your clothing or bed for the past month about the scabies  Tell them to ask their healthcare provider for scabies medicine even if they have no itching, rash, or burrow marks      Wash all items that you have used since 3 days before you learned about your scabies  Use hot water to wash all clothing, bedding, and towels  Dry them for at least 20 minutes on the hot cycle of a dryer  Take items to be dry cleaned that cannot be washed in a washing machine  Place any clothing or bedding that cannot be washed or dry cleaned in a closed plastic bag for 1 week  Do not have close body contact with anyone until the scabies mites are gone  Talk to your healthcare provider about how long you need to wait  Also ask about public places to avoid, such as the gym  Help relieve itching: Your skin may continue to itch for 2 or 3 weeks, even after the scabies mites are gone  Over-the-counter antihistamines or cortisone cream may help relieve itching  Trim your fingernails so you do not spread any mites that are still alive after treatment  Do not scratch your skin  Scratches may cause a skin infection  A cool bath may also help relieve the itching  Return to school or work: You may return to school or work 24 hours after using scabies medicine  © Copyright [employer] 2022 Information is for End User's use only and may not be sold, redistributed or otherwise used for commercial purposes  All illustrations and images included in CareNotes® are the copyrighted property of A D A M , Inc  or [facility] Jorge A Jacinto   The above information is an  only  It is not intended as medical advice for individual conditions or treatments  Talk to your doctor, nurse or pharmacist before following any medical regimen to see if it is safe and effective for you  Acute Rash   WHAT YOU NEED TO KNOW:   A rash is irritated, red, or itchy skin or mucus membranes, such as the lining of your nose or throat  Acute means the rash starts suddenly, worsens quickly, and lasts a short time  Common causes include a disease or infection, a reaction to something you are allergic to, or certain medicines    DISCHARGE INSTRUCTIONS:   Return to the emergency department if:   You have sudden trouble breathing or chest pain  You are vomiting, have a headache or muscle aches, and your throat hurts  Call your doctor or dermatologist if:   You have a fever  You get open wounds from scratching your skin, or you have a wound that is red, swollen, or painful  Your rash lasts longer than 3 months  You have swelling or pain in your joints  You have questions or concerns about your condition or care  Medicines:  If your rash does not go away on its own, you may need the following medicines:  Antihistamines  may be given to help decrease itching  Steroids  may be given to decrease inflammation  Antibiotics  help fight or prevent a bacterial infection  Take your medicine as directed  Contact your healthcare provider if you think your medicine is not helping or if you have side effects  Tell him of her if you are allergic to any medicine  Keep a list of the medicines, vitamins, and herbs you take  Include the amounts, and when and why you take them  Bring the list or the pill bottles to follow-up visits  Carry your medicine list with you in case of an emergency  Prevent a rash or care for your skin when you have a rash:  Dry skin can lead to more problems  Do not scratch your skin if it itches  You may cause a skin infection by scratching  The following may prevent dry skin, and help your skin look better:  Help soothe your rash  Apply thick cream lotions or petroleum jelly  Cool compresses may also soothe your skin  Apply a cool compress or a cool, wet towel, and then cover it with a dry towel  Use lukewarm water when you bathe  Hot water may damage your skin more  Pat your skin dry  Do not rub your skin with a towel  Use detergents, soaps, shampoos, and bubble baths  made for sensitive skin  Wear clothes made of cotton instead of nylon or wool  Cotton is softer, so it will not hurt your skin as much      Follow up with your healthcare providers as directed:  A dermatologist may help find the cause of your rash or help plan or change treatment  A dietitian may help with meal planning if you have a food allergy  Write down your questions so you remember to ask them during your visits  © Copyright [address] 2022 Information is for End User's use only and may not be sold, redistributed or otherwise used for commercial purposes  All illustrations and images included in CareNotes® are the copyrighted property of [employer]  or [facility] Jorge A Jacinto   The above information is an  only  It is not intended as medical advice for individual conditions or treatments  Talk to your doctor, nurse or pharmacist before following any medical regimen to see if it is safe and effective for you

## 2022-07-03 ENCOUNTER — OFFICE VISIT (OUTPATIENT)
Dept: URGENT CARE | Facility: CLINIC | Age: 52
End: 2022-07-03
Payer: COMMERCIAL

## 2022-07-03 VITALS
WEIGHT: 205 LBS | TEMPERATURE: 97 F | RESPIRATION RATE: 20 BRPM | HEART RATE: 85 BPM | HEIGHT: 62 IN | SYSTOLIC BLOOD PRESSURE: 174 MMHG | OXYGEN SATURATION: 98 % | BODY MASS INDEX: 37.73 KG/M2 | DIASTOLIC BLOOD PRESSURE: 80 MMHG

## 2022-07-03 DIAGNOSIS — R21 RASH: Primary | ICD-10-CM

## 2022-07-03 PROCEDURE — 99213 OFFICE O/P EST LOW 20 MIN: CPT | Performed by: EMERGENCY MEDICINE

## 2022-07-03 RX ORDER — GABAPENTIN 300 MG/1
300 CAPSULE ORAL
COMMUNITY
Start: 2022-06-20

## 2022-07-03 RX ORDER — IVERMECTIN 3 MG/1
TABLET ORAL
COMMUNITY
Start: 2022-06-30

## 2022-07-03 RX ORDER — FLUCONAZOLE 150 MG/1
150 TABLET ORAL ONCE
Qty: 1 TABLET | Refills: 0 | Status: SHIPPED | OUTPATIENT
Start: 2022-07-03 | End: 2022-07-03

## 2022-07-03 RX ORDER — CLOTRIMAZOLE 1 %
CREAM (GRAM) TOPICAL 2 TIMES DAILY
Qty: 30 G | Refills: 0 | Status: SHIPPED | OUTPATIENT
Start: 2022-07-03

## 2022-07-03 RX ORDER — LIDOCAINE 50 MG/G
PATCH TOPICAL
COMMUNITY
Start: 2022-06-23

## 2022-07-03 RX ORDER — ROSUVASTATIN CALCIUM 10 MG/1
10 TABLET, COATED ORAL
COMMUNITY
Start: 2022-05-02

## 2022-07-03 NOTE — PATIENT INSTRUCTIONS
Skin Yeast Infection   WHAT YOU NEED TO KNOW:   Yeast is normally present on the skin  Infection happens when you have too much yeast, or when it gets into a cut on your skin  Certain types of mold and fungus can cause a yeast infection  A skin yeast infection can appear anywhere on your skin or nail beds  Skin yeast infections are usually found on warm, moist parts of the body  Examples include between skin folds or under the breasts  DISCHARGE INSTRUCTIONS:   Return to the emergency department if:   You have signs of infection, such as pus, warmth or red streaks coming from the wound, or a fever  Call your doctor if:   Your symptoms worsen or do not get better within 7 to 10 days  You have new or returning signs of a skin yeast infection after treatment  You have questions or concerns about your condition or care  Medicines:   Antifungal medicine  may be given as a cream, ointment, or pill  Take your medicine as directed  Contact your healthcare provider if you think your medicine is not helping or if you have side effects  Tell him or her if you are allergic to any medicine  Keep a list of the medicines, vitamins, and herbs you take  Include the amounts, and when and why you take them  Bring the list or the pill bottles to follow-up visits  Carry your medicine list with you in case of an emergency  Care for the skin near the infection:  You may only have discolored patches of skin, or areas that are dry and flaking  Care for these skin problems as directed by your healthcare provider  If you have painful skin or an open sore, you will need to protect the skin and prevent damage  You will also need to keep the skin dry as much as possible  Ask your healthcare provider how to care for your skin while the infection clears  The following are general guidelines for caring for painful or open skin:  Keep the skin clean  Ask your healthcare provider if you should wash with mild soap and water   Do not use soap that contains alcohol  Alcohol can dry and irritate the skin and make symptoms worse  Your baby's healthcare provider may tell you to use diaper cream or ointment when you change his or her diaper  This will protect the skin and prevent moisture from collecting  Keep the skin dry  Pat the area dry with a towel  Do not rub, because this may irritate the skin  If you have a skin yeast infection between skin folds, lift the top part gently and hold it while you dry between your skin folds  Always dry your feet completely after you swim or bathe, including between your toes  Dry your skin if you are sweating from exercise or exposure to heat  Use a clean towel each time to prevent spreading or continuing the infection  Keep the skin protected  Ask your healthcare provider if you should cover the area with a bandage or leave it open  Check your skin each day to make sure you do not have new or worsening problems  You may need to have someone check the skin if you cannot see the area easily  Prevent another skin yeast infection:   Do not share clothing or towels    Wear shower shoes if you need to use a public shower    Dry your feet completely after you bathe, and apply antifungal powder or cream as directed    Put on socks before you get dressed so you do not spread fungus from your feet    Wear light clothing that allows air to get to your skin    Manage your weight to prevent skin folds where yeast can collect    Manage diabetes    Use antibiotics correctly to prevent antibiotic resistance    Change your baby's diaper often, and keep the area clean and dry as much as possible    Use a diaper cream or ointment that contains zinc oxide or dimethicone on your baby's diaper area as directed    Follow up with your doctor as directed:  Write down your questions so you remember to ask them during your visits    © Copyright [employer] 2022 Information is for End User's use only and may not be sold, redistributed or otherwise used for commercial purposes  All illustrations and images included in CareNotes® are the copyrighted property of A [employer]  or Berna Bennett  The above information is an  only  It is not intended as medical advice for individual conditions or treatments  Talk to your doctor, nurse or pharmacist before following any medical regimen to see if it is safe and effective for you

## 2022-07-03 NOTE — PROGRESS NOTES
[employer] [facility]        NAME: Miky Sullivan is a 46 y o  female  : 1970    MRN: [id number]  DATE: July 3, 2022  TIME: 4:26 PM    Assessment and Plan   Rash [R21]  1  Rash           Patient Instructions     There are no Patient Instructions on file for this visit  Follow up with PCP in 3-5 days  Proceed to  ER if symptoms worsen  Chief Complaint     Chief Complaint   Patient presents with    scabies     Reports seen here for scabies on / for scabies, reports rash gotten worse  Reports no one else in the house got scabies  History of Present Illness       Patient seen here 6 days ago for pruritic rash with features of scabies and allergic contact dermatitis ever she was treated with both  She returns now because she claims rash has not gotten any better and now involves her face  She was recently treated for fungal skin reaction under her breasts with ketoconazole cream   She is prediabetic  [facility] Review of Systems   Review of Systems   Constitutional: Negative for chills and fever  Respiratory: Negative for shortness of breath  Musculoskeletal: Negative for arthralgias, joint swelling, myalgias, neck pain and neck stiffness  Skin: Positive for color change and rash  Negative for wound  Neurological: Negative for dizziness and headaches  Current Medications       Current Outpatient Medications:     amLODIPine (NORVASC) 10 mg tablet, Take 10 mg by mouth daily, Disp: , Rfl:     atenolol (TENORMIN) 25 mg tablet, TAKE 1 TABLET (25 MG TOTAL) BY MOUTH DAILY   CANCEL CLONIDINE, Disp: , Rfl: 0    cloNIDine (CATAPRES) 0 2 mg tablet, Take 0 2 mg by mouth daily at bedtime, Disp: , Rfl:     cyclobenzaprine (FLEXERIL) 10 mg tablet, Take 1 tablet (10 mg total) by mouth 3 (three) times a day as needed for muscle spasms, Disp: 20 tablet, Rfl: 0    EPINEPHrine (EPIPEN) 0 3 mg/0 3 mL SOAJ, , Disp: , Rfl:     ergocalciferol (VITAMIN D2) 50,000 units, TAKE 1 CAPSULE BY MOUTH ONE TIME PER WEEK, Disp: , Rfl:     methocarbamol (ROBAXIN) 500 mg tablet, Take 1 tablet (500 mg total) by mouth 4 (four) times a day, Disp: 30 tablet, Rfl: 0    pantoprazole (PROTONIX) 20 mg tablet, Take 20 mg by mouth daily, Disp: , Rfl:     pramipexole (MIRAPEX) 1 mg tablet, Take 0 25 mg by mouth 3 (three) times a day, Disp: , Rfl:     predniSONE 10 mg tablet, Take once daily all days pills on this schedule 6- 6- 5- 4- 3- 2- 1, Disp: 27 tablet, Rfl: 0    predniSONE 10 mg tablet, Take once daily all days pills on this schedule 6- 6- 5- 4- 3- 2- 1, Disp: 27 tablet, Rfl: 0    rosuvastatin (CRESTOR) 10 MG tablet, Take 10 mg by mouth, Disp: , Rfl:     SUMAtriptan (IMITREX) 100 mg tablet, Take 100 mg by mouth once as needed for migraine, Disp: , Rfl:     traMADol (ULTRAM) 50 mg tablet, Take 1 tablet (50 mg total) by mouth every 6 (six) hours as needed for moderate pain, Disp: 8 tablet, Rfl: 0    venlafaxine (EFFEXOR-XR) 75 mg 24 hr capsule, Take 75 mg by mouth, Disp: , Rfl:     albuterol (ProAir HFA) 90 mcg/act inhaler, Inhale 2 puffs every 6 (six) hours as needed for shortness of breath Dispense 1 spacer (Patient not taking: Reported on 7/3/2022), Disp: 8 5 g, Rfl: 0    buPROPion (WELLBUTRIN XL) 150 mg 24 hr tablet, Take 150 mg by mouth, Disp: , Rfl:     Fesoterodine Fumarate ER 4 MG TB24, Take 1 tablet (4 mg total) by mouth daily at bedtime (Patient not taking: Reported on 7/3/2022), Disp: 30 tablet, Rfl: 1    fluticasone (FLONASE) 50 mcg/act nasal spray, 1 spray into each nostril daily for 14 days, Disp: 1 g, Rfl: 1    gabapentin (NEURONTIN) 300 mg capsule, Take 300 mg by mouth daily at bedtime as needed, Disp: , Rfl:     gabapentin (NEURONTIN) 600 MG tablet, TAKE 1 Tablet BY MOUTH TWICE DAILY FOR PAIN AND SLEEP, Disp: , Rfl: 0    hydroxychloroquine (PLAQUENIL) 200 mg tablet, Take 400 mg by mouth daily, Disp: , Rfl: 5    hydrOXYzine HCL (ATARAX) 25 mg tablet, Take 25 mg by mouth every 6 (six) hours as needed for itching (Patient not taking: Reported on 7/3/2022), Disp: , Rfl:     ivermectin (STROMECTOL) 3 MG TABS, TAKE 6 TABLETS (18 MG TOTAL) BY MOUTH EVERY 14 (FOURTEEN) DAYS , Disp: , Rfl:     ketotifen (ZADITOR) 0 025 % ophthalmic solution, Administer 1 drop into the left eye 2 (two) times a day (Patient not taking: Reported on 7/3/2022), Disp: 5 mL, Rfl: 0    lidocaine (LIDODERM) 5 %, PLACE 1 PATCH ONTO THE SKIN DAILY  REMOVE & DISCARD PATCH WITHIN 12 HOURS OR AS DIRECTED BY MD, Disp: , Rfl:     lovastatin (MEVACOR) 10 MG tablet, Take 10 mg by mouth, Disp: , Rfl:     meloxicam (MOBIC) 7 5 mg tablet, Take 7 5 mg by mouth, Disp: , Rfl:     metFORMIN (GLUCOPHAGE-XR) 500 mg 24 hr tablet, Take 1 tablet by mouth, Disp: , Rfl:     methocarbamol (ROBAXIN) 500 mg tablet, Take 1 tablet (500 mg total) by mouth 3 (three) times a day (Patient not taking: Reported on 6/11/2021), Disp: 20 tablet, Rfl: 0    methylPREDNISolone 4 MG tablet therapy pack, TAKE 6 TABLETS ON DAY 1 AS DIRECTED ON PACKAGE AND DECREASE BY 1 TAB EACH DAY FOR A TOTAL OF 6 DAYS (Patient not taking: Reported on 7/3/2022), Disp: , Rfl:     montelukast (SINGULAIR) 10 mg tablet, Take 10 mg by mouth daily at bedtime, Disp: , Rfl:     Naltrexone-buPROPion HCl ER (Contrave) 8-90 MG TB12, Week #1: 1 tab in morning, Week #2: 1 tab in the morning and 1 tab in afternoon, Week #3: 2 tab in morning and 1 tab in afternoon, Week #4 and thereafter: 2 tab in morning and 2 tab in afternoon (Patient not taking: Reported on 7/3/2022), Disp: , Rfl:     omeprazole (PriLOSEC) 10 mg delayed release capsule, Take 20 mg by mouth, Disp: , Rfl:     pramipexole (MIRAPEX) 1 mg tablet, Take 1 mg by mouth (Patient not taking: Reported on 7/3/2022), Disp: , Rfl:     predniSONE 10 mg tablet, Take 6 pills day one, 5 pills day 2, 4 pills day 3, 3 pills day 4, 2 pills day 5, and 1 pill day 6   (Patient not taking: Reported on 8/22/2020), Disp: 21 tablet, Rfl: 0    predniSONE 10 mg tablet, Take once daily all days pills on this schedule 6- 6- 5- 4- 3- 2- 1 (Patient not taking: No sig reported), Disp: 27 tablet, Rfl: 0    Current Allergies     Allergies as of 07/03/2022 - Reviewed 07/03/2022   Allergen Reaction Noted    Dog epithelium Swelling 11/14/2019    Ibuprofen Swelling 03/15/2018    Lisinopril Swelling 03/15/2018    Red dye - food allergy Swelling 03/15/2018    Latex Rash 03/15/2018            The following portions of the patient's history were reviewed and updated as appropriate: allergies, current medications, past family history, past medical history, past social history, past surgical history and problem list      Past Medical History:   Diagnosis Date    Angioedema     Anxiety     Arthritis     Diverticulitis     Hypertension     Migraine     Overactive bladder     Restless leg        Past Surgical History:   Procedure Laterality Date    ADENOIDECTOMY      BARIATRIC SURGERY      CARPAL TUNNEL RELEASE  01/07/2021    BL    HYSTERECTOMY      TONSILLECTOMY         Family History   Problem Relation Age of Onset    Diabetes Mother     Hypertension Mother     Heart disease Father     Hypertension Father          Medications have been verified  Objective   BP (!) 174/80   Pulse 85   Temp (!) 97 °F (36 1 °C)   Resp 20   Ht 5' 2" (1 575 m)   Wt 93 kg (205 lb)   SpO2 98%   BMI 37 49 kg/m²        Physical Exam     Physical Exam  Vitals and nursing note reviewed  Constitutional:       General: She is not in acute distress  Appearance: She is well-developed  HENT:      Head: Normocephalic and atraumatic  Right Ear: Tympanic membrane normal       Left Ear: Tympanic membrane normal       Nose: Mucosal edema present  Mouth/Throat:      Pharynx: No oropharyngeal exudate or posterior oropharyngeal erythema  Tonsils: No tonsillar abscesses  Musculoskeletal:      Cervical back: Neck supple  Skin:     General: Skin is warm and dry  Findings: Erythema and rash present  Comments: Erythematous rash on cheeks, neck and under rest with fungal features of   Neurological:      Mental Status: She is alert and oriented to person, place, and time  Psychiatric:         Mood and Affect: Mood normal          Behavior: Behavior normal          Thought Content:  Thought content normal          Judgment: Judgment normal

## 2022-07-07 ENCOUNTER — HOSPITAL ENCOUNTER (EMERGENCY)
Facility: HOSPITAL | Age: 52
Discharge: HOME | End: 2022-07-07
Attending: EMERGENCY MEDICINE | Admitting: EMERGENCY MEDICINE
Payer: COMMERCIAL

## 2022-07-07 VITALS
HEART RATE: 99 BPM | OXYGEN SATURATION: 98 % | WEIGHT: 183.64 LBS | HEIGHT: 62 IN | SYSTOLIC BLOOD PRESSURE: 142 MMHG | DIASTOLIC BLOOD PRESSURE: 91 MMHG | TEMPERATURE: 98.5 F | RESPIRATION RATE: 18 BRPM | BODY MASS INDEX: 33.79 KG/M2

## 2022-07-07 DIAGNOSIS — N39.0 UTI (URINARY TRACT INFECTION): ICD-10-CM

## 2022-07-07 DIAGNOSIS — L25.9 CONTACT DERMATITIS: Primary | ICD-10-CM

## 2022-07-07 LAB
ALBUMIN SERPL BCP-MCNC: 4 G/DL (ref 3.5–5)
ALP SERPL-CCNC: 65 U/L (ref 46–116)
ALT SERPL W P-5'-P-CCNC: 28 U/L (ref 12–78)
ANION GAP SERPL CALCULATED.3IONS-SCNC: 8 MMOL/L (ref 4–13)
AST SERPL W P-5'-P-CCNC: 16 U/L (ref 5–45)
BACTERIA UR QL AUTO: ABNORMAL /HPF
BASOPHILS # BLD AUTO: 0.05 THOUSANDS/ΜL (ref 0–0.1)
BASOPHILS NFR BLD AUTO: 1 % (ref 0–1)
BILIRUB SERPL-MCNC: 0.64 MG/DL (ref 0.2–1)
BILIRUB UR QL STRIP: NEGATIVE
BUN SERPL-MCNC: 23 MG/DL (ref 5–25)
CALCIUM SERPL-MCNC: 9.5 MG/DL (ref 8.3–10.1)
CHLORIDE SERPL-SCNC: 103 MMOL/L (ref 100–108)
CLARITY UR: CLEAR
CO2 SERPL-SCNC: 30 MMOL/L (ref 21–32)
COLOR UR: ABNORMAL
CREAT SERPL-MCNC: 0.77 MG/DL (ref 0.6–1.3)
EOSINOPHIL # BLD AUTO: 0.61 THOUSAND/ΜL (ref 0–0.61)
EOSINOPHIL NFR BLD AUTO: 8 % (ref 0–6)
ERYTHROCYTE [DISTWIDTH] IN BLOOD BY AUTOMATED COUNT: 13.2 % (ref 11.6–15.1)
GFR SERPL CREATININE-BSD FRML MDRD: 89 ML/MIN/1.73SQ M
GLUCOSE SERPL-MCNC: 101 MG/DL (ref 65–140)
GLUCOSE UR STRIP-MCNC: ABNORMAL MG/DL
HCT VFR BLD AUTO: 43.5 % (ref 34.8–46.1)
HGB BLD-MCNC: 14.6 G/DL (ref 11.5–15.4)
HGB UR QL STRIP.AUTO: NEGATIVE
IMM GRANULOCYTES # BLD AUTO: 0.02 THOUSAND/UL (ref 0–0.2)
IMM GRANULOCYTES NFR BLD AUTO: 0 % (ref 0–2)
KETONES UR STRIP-MCNC: NEGATIVE MG/DL
LEUKOCYTE ESTERASE UR QL STRIP: NEGATIVE
LYMPHOCYTES # BLD AUTO: 1.96 THOUSANDS/ΜL (ref 0.6–4.47)
LYMPHOCYTES NFR BLD AUTO: 27 % (ref 14–44)
MCH RBC QN AUTO: 29.7 PG (ref 26.8–34.3)
MCHC RBC AUTO-ENTMCNC: 33.6 G/DL (ref 31.4–37.4)
MCV RBC AUTO: 88 FL (ref 82–98)
MONOCYTES # BLD AUTO: 0.64 THOUSAND/ΜL (ref 0.17–1.22)
MONOCYTES NFR BLD AUTO: 9 % (ref 4–12)
NEUTROPHILS # BLD AUTO: 4.03 THOUSANDS/ΜL (ref 1.85–7.62)
NEUTS SEG NFR BLD AUTO: 55 % (ref 43–75)
NITRITE UR QL STRIP: POSITIVE
NON-SQ EPI CELLS URNS QL MICRO: ABNORMAL /HPF
NRBC BLD AUTO-RTO: 0 /100 WBCS
PH UR STRIP.AUTO: 6 [PH] (ref 4.5–8)
PLATELET # BLD AUTO: 332 THOUSANDS/UL (ref 149–390)
PMV BLD AUTO: 9.8 FL (ref 8.9–12.7)
POTASSIUM SERPL-SCNC: 4.4 MMOL/L (ref 3.5–5.3)
PROT SERPL-MCNC: 7.8 G/DL (ref 6.4–8.2)
PROT UR STRIP-MCNC: ABNORMAL MG/DL
RBC # BLD AUTO: 4.92 MILLION/UL (ref 3.81–5.12)
RBC #/AREA URNS AUTO: ABNORMAL /HPF
SODIUM SERPL-SCNC: 141 MMOL/L (ref 136–145)
SP GR UR STRIP.AUTO: 1.02 (ref 1–1.03)
UROBILINOGEN UR QL STRIP.AUTO: 1 E.U./DL
WBC # BLD AUTO: 7.31 THOUSAND/UL (ref 4.31–10.16)
WBC #/AREA URNS AUTO: ABNORMAL /HPF

## 2022-07-07 PROCEDURE — 96374 THER/PROPH/DIAG INJ IV PUSH: CPT

## 2022-07-07 PROCEDURE — 81001 URINALYSIS AUTO W/SCOPE: CPT

## 2022-07-07 PROCEDURE — 36415 COLL VENOUS BLD VENIPUNCTURE: CPT | Performed by: EMERGENCY MEDICINE

## 2022-07-07 PROCEDURE — 99284 EMERGENCY DEPT VISIT MOD MDM: CPT | Performed by: EMERGENCY MEDICINE

## 2022-07-07 PROCEDURE — 80053 COMPREHEN METABOLIC PANEL: CPT | Performed by: EMERGENCY MEDICINE

## 2022-07-07 PROCEDURE — 99283 EMERGENCY DEPT VISIT LOW MDM: CPT

## 2022-07-07 PROCEDURE — NC001 PR NO CHARGE: Performed by: DERMATOLOGY

## 2022-07-07 PROCEDURE — 85025 COMPLETE CBC W/AUTO DIFF WBC: CPT | Performed by: EMERGENCY MEDICINE

## 2022-07-07 RX ORDER — DIPHENHYDRAMINE HYDROCHLORIDE 50 MG/ML
25 INJECTION INTRAMUSCULAR; INTRAVENOUS ONCE
Status: COMPLETED | OUTPATIENT
Start: 2022-07-07 | End: 2022-07-07

## 2022-07-07 RX ORDER — CEPHALEXIN 500 MG/1
500 CAPSULE ORAL 3 TIMES DAILY
Qty: 28 CAPSULE | Refills: 0 | Status: SHIPPED | OUTPATIENT
Start: 2022-07-07 | End: 2022-07-12

## 2022-07-07 RX ORDER — DIPHENHYDRAMINE HCL 25 MG
25 TABLET ORAL EVERY 6 HOURS PRN
Qty: 20 TABLET | Refills: 0 | Status: SHIPPED | OUTPATIENT
Start: 2022-07-07

## 2022-07-07 RX ORDER — CLOBETASOL PROPIONATE 0.5 MG/G
OINTMENT TOPICAL 2 TIMES DAILY
Qty: 45 G | Refills: 0 | Status: SHIPPED | OUTPATIENT
Start: 2022-07-07

## 2022-07-07 RX ORDER — ACETAMINOPHEN 325 MG/1
650 TABLET ORAL ONCE
Status: COMPLETED | OUTPATIENT
Start: 2022-07-07 | End: 2022-07-07

## 2022-07-07 RX ORDER — FAMOTIDINE 20 MG/1
20 TABLET, FILM COATED ORAL ONCE
Status: COMPLETED | OUTPATIENT
Start: 2022-07-07 | End: 2022-07-07

## 2022-07-07 RX ORDER — CEPHALEXIN 250 MG/1
500 CAPSULE ORAL ONCE
Status: COMPLETED | OUTPATIENT
Start: 2022-07-07 | End: 2022-07-07

## 2022-07-07 RX ORDER — FAMOTIDINE 20 MG/1
20 TABLET, FILM COATED ORAL
Qty: 14 TABLET | Refills: 0 | Status: SHIPPED | OUTPATIENT
Start: 2022-07-07 | End: 2022-10-12

## 2022-07-07 RX ADMIN — CEPHALEXIN 500 MG: 250 CAPSULE ORAL at 11:47

## 2022-07-07 RX ADMIN — DIPHENHYDRAMINE HYDROCHLORIDE 25 MG: 50 INJECTION, SOLUTION INTRAMUSCULAR; INTRAVENOUS at 10:52

## 2022-07-07 RX ADMIN — ACETAMINOPHEN 650 MG: 325 TABLET, FILM COATED ORAL at 11:49

## 2022-07-07 RX ADMIN — FAMOTIDINE 20 MG: 20 TABLET ORAL at 10:46

## 2022-07-07 NOTE — ED PROVIDER NOTES
History  Chief Complaint   Patient presents with    Rash     Reports ongoing rash  Has been treated for scabies and most recent was told it was a fungas and completed that course of antibiotics  States rash has gotten worse  Reports increased burning, itching, and pain   Possible UTI     Reports burning with urination for the past 3 days  Denies discharge  History provided by:  Patient and medical records   used: No    Rash  Location:  Full body (Mostly concentrated on the arms, thorax and a little bit on the face)  Quality: itchiness, painful and redness    Quality: not bruising, not draining, not swelling and not weeping    Quality comment:  Crusting and now scabbing over  Severity:  Severe  Onset quality:  Sudden  Duration: several weeks  Timing:  Constant  Progression:  Worsening  Chronicity:  New  Context: plant contact    Context comment: This was noted to start several days after cleaning plants under her deck  Son has similar rash  Relieved by:  Nothing  Ineffective treatments: She has been on permethrin, ivermectin, steroid course and some type of anti fungal   Associated symptoms: no abdominal pain, no fever, no nausea, no shortness of breath and not vomiting    Patient is now complaining of burning with urination  No known fever  No flank pain  No nausea or vomiting  Prior to Admission Medications   Prescriptions Last Dose Informant Patient Reported? Taking?    EPINEPHrine (EPIPEN) 0 3 mg/0 3 mL SOAJ   Yes No   Fesoterodine Fumarate ER 4 MG TB24   No No   Sig: Take 1 tablet (4 mg total) by mouth daily at bedtime   Patient not taking: Reported on 7/3/2022   Naltrexone-buPROPion HCl ER (Contrave) 8-90 MG TB12   Yes No   Sig: Week #1: 1 tab in morning, Week #2: 1 tab in the morning and 1 tab in afternoon, Week #3: 2 tab in morning and 1 tab in afternoon, Week #4 and thereafter: 2 tab in morning and 2 tab in afternoon   Patient not taking: Reported on 7/3/2022 SUMAtriptan (IMITREX) 100 mg tablet  Self Yes No   Sig: Take 100 mg by mouth once as needed for migraine   albuterol (ProAir HFA) 90 mcg/act inhaler   No No   Sig: Inhale 2 puffs every 6 (six) hours as needed for shortness of breath Dispense 1 spacer   Patient not taking: Reported on 7/3/2022   amLODIPine (NORVASC) 10 mg tablet  Self Yes No   Sig: Take 10 mg by mouth daily   atenolol (TENORMIN) 25 mg tablet   Yes No   Sig: TAKE 1 TABLET (25 MG TOTAL) BY MOUTH DAILY  CANCEL CLONIDINE   buPROPion (WELLBUTRIN XL) 150 mg 24 hr tablet   Yes No   Sig: Take 150 mg by mouth   cloNIDine (CATAPRES) 0 2 mg tablet  Self Yes No   Sig: Take 0 2 mg by mouth daily at bedtime   clotrimazole (LOTRIMIN) 1 % cream   No No   Sig: Apply topically 2 (two) times a day   cyclobenzaprine (FLEXERIL) 10 mg tablet   No No   Sig: Take 1 tablet (10 mg total) by mouth 3 (three) times a day as needed for muscle spasms   ergocalciferol (VITAMIN D2) 50,000 units   Yes No   Sig: TAKE 1 CAPSULE BY MOUTH ONE TIME PER WEEK   fluticasone (FLONASE) 50 mcg/act nasal spray   No No   Si spray into each nostril daily for 14 days   gabapentin (NEURONTIN) 300 mg capsule   Yes No   Sig: Take 300 mg by mouth daily at bedtime as needed   gabapentin (NEURONTIN) 600 MG tablet   Yes No   Sig: TAKE 1 Tablet BY MOUTH TWICE DAILY FOR PAIN AND SLEEP   hydrOXYzine HCL (ATARAX) 25 mg tablet  Self Yes No   Sig: Take 25 mg by mouth every 6 (six) hours as needed for itching   Patient not taking: Reported on 7/3/2022   hydroxychloroquine (PLAQUENIL) 200 mg tablet   Yes No   Sig: Take 400 mg by mouth daily   ivermectin (STROMECTOL) 3 MG TABS   Yes No   Sig: TAKE 6 TABLETS (18 MG TOTAL) BY MOUTH EVERY 14 (FOURTEEN) DAYS    ketotifen (ZADITOR) 0 025 % ophthalmic solution   No No   Sig: Administer 1 drop into the left eye 2 (two) times a day   Patient not taking: Reported on 7/3/2022   lidocaine (LIDODERM) 5 %   Yes No   Sig: PLACE 1 PATCH ONTO THE SKIN DAILY   REMOVE & DISCARD PATCH WITHIN 12 HOURS OR AS DIRECTED BY MD   lovastatin (MEVACOR) 10 MG tablet   Yes No   Sig: Take 10 mg by mouth   meloxicam (MOBIC) 7 5 mg tablet   Yes No   Sig: Take 7 5 mg by mouth   metFORMIN (GLUCOPHAGE-XR) 500 mg 24 hr tablet   Yes No   Sig: Take 1 tablet by mouth   methocarbamol (ROBAXIN) 500 mg tablet   No No   Sig: Take 1 tablet (500 mg total) by mouth 3 (three) times a day   Patient not taking: Reported on 6/11/2021   methocarbamol (ROBAXIN) 500 mg tablet   No No   Sig: Take 1 tablet (500 mg total) by mouth 4 (four) times a day   methylPREDNISolone 4 MG tablet therapy pack   Yes No   Sig: TAKE 6 TABLETS ON DAY 1 AS DIRECTED ON PACKAGE AND DECREASE BY 1 TAB EACH DAY FOR A TOTAL OF 6 DAYS   Patient not taking: Reported on 7/3/2022   montelukast (SINGULAIR) 10 mg tablet  Self Yes No   Sig: Take 10 mg by mouth daily at bedtime   omeprazole (PriLOSEC) 10 mg delayed release capsule   Yes No   Sig: Take 20 mg by mouth   pantoprazole (PROTONIX) 20 mg tablet   Yes No   Sig: Take 20 mg by mouth daily   pramipexole (MIRAPEX) 1 mg tablet  Self Yes No   Sig: Take 0 25 mg by mouth 3 (three) times a day   pramipexole (MIRAPEX) 1 mg tablet   Yes No   Sig: Take 1 mg by mouth   Patient not taking: Reported on 7/3/2022   predniSONE 10 mg tablet   No No   Sig: Take 6 pills day one, 5 pills day 2, 4 pills day 3, 3 pills day 4, 2 pills day 5, and 1 pill day 6     Patient not taking: Reported on 8/22/2020   predniSONE 10 mg tablet   No No   Sig: Take once daily all days pills on this schedule 6- 6- 5- 4- 3- 2- 1   predniSONE 10 mg tablet   No No   Sig: Take once daily all days pills on this schedule 6- 6- 5- 4- 3- 2- 1   Patient not taking: No sig reported   predniSONE 10 mg tablet   No No   Sig: Take once daily all days pills on this schedule 6- 6- 5- 4- 3- 2- 1   rosuvastatin (CRESTOR) 10 MG tablet   Yes No   Sig: Take 10 mg by mouth   traMADol (ULTRAM) 50 mg tablet   No No   Sig: Take 1 tablet (50 mg total) by mouth every 6 (six) hours as needed for moderate pain   venlafaxine (EFFEXOR-XR) 75 mg 24 hr capsule   Yes No   Sig: Take 75 mg by mouth      Facility-Administered Medications: None       Past Medical History:   Diagnosis Date    Angioedema     Anxiety     Arthritis     Diverticulitis     Hypertension     Migraine     Overactive bladder     Restless leg        Past Surgical History:   Procedure Laterality Date    ADENOIDECTOMY      BARIATRIC SURGERY      CARPAL TUNNEL RELEASE  01/07/2021    BL    HYSTERECTOMY      TONSILLECTOMY         Family History   Problem Relation Age of Onset    Diabetes Mother     Hypertension Mother     Heart disease Father     Hypertension Father      I have reviewed and agree with the history as documented  E-Cigarette/Vaping    E-Cigarette Use Never User      E-Cigarette/Vaping Substances     Social History     Tobacco Use    Smoking status: Never Smoker    Smokeless tobacco: Never Used   Vaping Use    Vaping Use: Never used   Substance Use Topics    Alcohol use: No    Drug use: No       Review of Systems   Constitutional: Negative for chills and fever  Respiratory: Negative for cough, chest tightness and shortness of breath  Cardiovascular: Negative for chest pain and leg swelling  Gastrointestinal: Negative for abdominal pain, nausea and vomiting  Genitourinary: Positive for dysuria  Negative for difficulty urinating and flank pain  Musculoskeletal: Negative for gait problem  Skin: Positive for rash  All other systems reviewed and are negative  Physical Exam  Physical Exam  Vitals and nursing note reviewed  Constitutional:       General: She is in acute distress  Appearance: Normal appearance  She is well-developed  She is not ill-appearing, toxic-appearing or diaphoretic  HENT:      Head: Normocephalic and atraumatic  Right Ear: Hearing normal  No drainage or swelling  Left Ear: Hearing normal  No drainage or swelling  Mouth/Throat:      Mouth: Mucous membranes are moist       Pharynx: Oropharynx is clear  Eyes:      General: Lids are normal          Right eye: No discharge  Left eye: No discharge  Conjunctiva/sclera: Conjunctivae normal    Neck:      Vascular: No JVD  Trachea: Trachea normal    Cardiovascular:      Rate and Rhythm: Normal rate and regular rhythm  Pulses: Normal pulses  Heart sounds: Normal heart sounds  No murmur heard  No friction rub  No gallop  Pulmonary:      Effort: Pulmonary effort is normal  No respiratory distress  Breath sounds: Normal breath sounds  No stridor  No wheezing or rales  Abdominal:      Palpations: Abdomen is soft  Tenderness: There is no abdominal tenderness  There is no guarding or rebound  Musculoskeletal:         General: No tenderness  Normal range of motion  Cervical back: Normal range of motion  Right lower leg: No edema  Left lower leg: No edema  Skin:     General: Skin is warm and dry  Coloration: Skin is not pale  Findings: No rash  Neurological:      General: No focal deficit present  Mental Status: She is alert  GCS: GCS eye subscore is 4  GCS verbal subscore is 5  GCS motor subscore is 6  Sensory: No sensory deficit  Motor: No abnormal muscle tone  Psychiatric:         Attention and Perception: Attention normal          Mood and Affect: Mood is anxious  Speech: Speech normal          Behavior: Behavior normal  Behavior is cooperative                           Vital Signs  ED Triage Vitals   Temperature Pulse Respirations Blood Pressure SpO2   07/07/22 0924 07/07/22 0924 07/07/22 0924 07/07/22 0927 07/07/22 0924   98 5 °F (36 9 °C) 99 18 (!) 172/106 98 %      Temp Source Heart Rate Source Patient Position - Orthostatic VS BP Location FiO2 (%)   07/07/22 0924 07/07/22 0924 07/07/22 0924 07/07/22 0924 --   Oral Monitor Sitting Left arm       Pain Score       -- Vitals:    07/07/22 0924 07/07/22 0927 07/07/22 1055   BP:  (!) 172/106 142/91   Pulse: 99     Patient Position - Orthostatic VS: Sitting           Visual Acuity      ED Medications  Medications   diphenhydrAMINE (BENADRYL) injection 25 mg (25 mg Intravenous Given 7/7/22 1052)   famotidine (PEPCID) tablet 20 mg (20 mg Oral Given 7/7/22 1046)   cephalexin (KEFLEX) capsule 500 mg (500 mg Oral Given 7/7/22 1147)   acetaminophen (TYLENOL) tablet 650 mg (650 mg Oral Given 7/7/22 1149)       Diagnostic Studies  Results Reviewed     Procedure Component Value Units Date/Time    Comprehensive metabolic panel [[id number]] Collected: 07/07/22 1051    Lab Status: Final result Specimen: Blood from Arm, Left Updated: 07/07/22 1125     Sodium 141 mmol/L      Potassium 4 4 mmol/L      Chloride 103 mmol/L      CO2 30 mmol/L      ANION GAP 8 mmol/L      BUN 23 mg/dL      Creatinine 0 77 mg/dL      Glucose 101 mg/dL      Calcium 9 5 mg/dL      AST 16 U/L      ALT 28 U/L      Alkaline Phosphatase 65 U/L      Total Protein 7 8 g/dL      Albumin 4 0 g/dL      Total Bilirubin 0 64 mg/dL      eGFR 89 ml/min/1 73sq m     Narrative:      [place] guidelines for Chronic Kidney Disease (CKD):     Stage 1 with normal or high GFR (GFR > 90 mL/min/1 73 square meters)    Stage 2 Mild CKD (GFR = 60-89 mL/min/1 73 square meters)    Stage 3A Moderate CKD (GFR = 45-59 mL/min/1 73 square meters)    Stage 3B Moderate CKD (GFR = 30-44 mL/min/1 73 square meters)    Stage 4 Severe CKD (GFR = 15-29 mL/min/1 73 square meters)    Stage 5 End Stage CKD (GFR <15 mL/min/1 73 square meters)  Note: GFR calculation is accurate only with a steady state creatinine    CBC and differential [[id number]]  (Abnormal) Collected: 07/07/22 1051    Lab Status: Final result Specimen: Blood from Arm, Left Updated: 07/07/22 1055     WBC 7 31 Thousand/uL      RBC 4 92 Million/uL      Hemoglobin 14 6 g/dL      Hematocrit 43 5 %      MCV 88 fL MCH 29 7 pg      MCHC 33 6 g/dL      RDW 13 2 %      MPV 9 8 fL      Platelets 612 Thousands/uL      nRBC 0 /100 WBCs      Neutrophils Relative 55 %      Immat GRANS % 0 %      Lymphocytes Relative 27 %      Monocytes Relative 9 %      Eosinophils Relative 8 %      Basophils Relative 1 %      Neutrophils Absolute 4 03 Thousands/µL      Immature Grans Absolute 0 02 Thousand/uL      Lymphocytes Absolute 1 96 Thousands/µL      Monocytes Absolute 0 64 Thousand/µL      Eosinophils Absolute 0 61 Thousand/µL      Basophils Absolute 0 05 Thousands/µL     Urine Microscopic [[id number]]  (Abnormal) Collected: 07/07/22 0957    Lab Status: Final result Specimen: Urine, Clean Catch Updated: 07/07/22 1025     RBC, UA 0-1 /hpf      WBC, UA 0-1 /hpf      Epithelial Cells Occasional /hpf      Bacteria, UA Moderate /hpf     Urine Macroscopic, POC [[id number]]  (Abnormal) Collected: 07/07/22 0957    Lab Status: Final result Specimen: Urine Updated: 07/07/22 0959     Color, UA Kelsey     Clarity, UA Clear     pH, UA 6 0     Leukocytes, UA Negative     Nitrite, UA Positive     Protein, UA Trace mg/dl      Glucose,  (1/10%) mg/dl      Ketones, UA Negative mg/dl      Urobilinogen, UA 1 0 E U /dl      Bilirubin, UA Negative     Occult Blood, UA Negative     Specific Gravity, UA 1 020    Narrative:      CLINITEK RESULT                 No orders to display              Procedures  Procedures         ED Course                                             MDM  Number of Diagnoses or Management Options  Contact dermatitis  UTI (urinary tract infection)  Diagnosis management comments: History suggestive of contact dermatitis and not have been treated with 2 short course of steroids  Does not appear to be scabies  I did discuss this with dermatology he reviewed the pictures and agrees that this is more likely a contact dermatitis and recommended clobetasol ointment which I prescribed  Also will try Benadryl and Pepcid for itching  Referral given to Dermatology  Patient also having symptoms of UTI with positive bacteria and nitrites  Will treat with Keflex  Amount and/or Complexity of Data Reviewed  Clinical lab tests: ordered and reviewed  Discuss the patient with other providers: yes    Patient Progress  Patient progress: stable      Disposition  Final diagnoses:   Contact dermatitis   UTI (urinary tract infection)     Time reflects when diagnosis was documented in both MDM as applicable and the Disposition within this note     Time User Action Codes Description Comment    7/7/2022 12:00 PM Kyle Vanessa Add [L25 9] Contact dermatitis     7/7/2022 12:00 PM Kyle Wrightos Add [N39 0] UTI (urinary tract infection)       ED Disposition     ED Disposition   Discharge    Condition   Stable    Date/Time   Thu Jul 7, 2022 12:00 PM    [place] [facility] discharge to home/self care                 Follow-up Information     Follow up With Specialties Details Why Contact Info Additional Information    Amanda Bunn MD Family Medicine Schedule an appointment as soon as possible for a visit in 3 days If symptoms worsen [facility] [id number]  [phone]       [facility] Schedule an appointment as soon as possible for a visit in 1 week If symptoms worsen [phone] [facility], [facility] [address], [place], [address] [facility]          Discharge Medication List as of 7/7/2022 12:04 PM      START taking these medications    Details   cephalexin (KEFLEX) 500 mg capsule Take 1 capsule (500 mg total) by mouth 3 (three) times a day for 5 days, Starting Thu 7/7/2022, Until Tue 7/12/2022, Normal      clobetasol (TEMOVATE) 0 05 % ointment Apply topically 2 (two) times a day Not on face or genitals, Starting Thu 7/7/2022, Normal      diphenhydrAMINE (BENADRYL) 25 mg tablet Take 1 tablet (25 mg total) by mouth every 6 (six) hours as needed for itching, Starting Thu 7/7/2022, Normal      famotidine (PEPCID) 20 mg tablet Take 1 tablet (20 mg total) by mouth daily at bedtime for 14 days, Starting Thu 7/7/2022, Until Thu 7/21/2022, Normal         CONTINUE these medications which have NOT CHANGED    Details   albuterol (ProAir HFA) 90 mcg/act inhaler Inhale 2 puffs every 6 (six) hours as needed for shortness of breath Dispense 1 spacer, Starting Fri 2/19/2021, Normal      amLODIPine (NORVASC) 10 mg tablet Take 10 mg by mouth daily, Historical Med      atenolol (TENORMIN) 25 mg tablet TAKE 1 TABLET (25 MG TOTAL) BY MOUTH DAILY   CANCEL CLONIDINE, Historical Med      buPROPion (WELLBUTRIN XL) 150 mg 24 hr tablet Take 150 mg by mouth, Starting Fri 1/25/2019, Until Mon 6/27/2022 at 2359, Historical Med      cloNIDine (CATAPRES) 0 2 mg tablet Take 0 2 mg by mouth daily at bedtime, Historical Med      clotrimazole (LOTRIMIN) 1 % cream Apply topically 2 (two) times a day, Starting Sun 7/3/2022, Normal      cyclobenzaprine (FLEXERIL) 10 mg tablet Take 1 tablet (10 mg total) by mouth 3 (three) times a day as needed for muscle spasms, Starting Fri 6/11/2021, Normal      EPINEPHrine (EPIPEN) 0 3 mg/0 3 mL SOAJ Historical Med      ergocalciferol (VITAMIN D2) 50,000 units TAKE 1 CAPSULE BY MOUTH ONE TIME PER WEEK, Historical Med      Fesoterodine Fumarate ER 4 MG TB24 Take 1 tablet (4 mg total) by mouth daily at bedtime, Starting Thu 8/16/2018, Phone In      fluticasone (FLONASE) 50 mcg/act nasal spray 1 spray into each nostril daily for 14 days, Starting Sat 8/7/2021, Until Sat 8/21/2021, Normal      gabapentin (NEURONTIN) 300 mg capsule Take 300 mg by mouth daily at bedtime as needed, Starting Mon 6/20/2022, Historical Med      gabapentin (NEURONTIN) 600 MG tablet TAKE 1 Tablet BY MOUTH TWICE DAILY FOR PAIN AND SLEEP, Historical Med      hydroxychloroquine (PLAQUENIL) 200 mg tablet Take 400 mg by mouth daily, Starting Sat 10/12/2019, Historical Med      hydrOXYzine HCL (ATARAX) 25 mg tablet Take 25 mg by mouth every 6 (six) hours as needed for itching, Historical Med      ivermectin (STROMECTOL) 3 MG TABS TAKE 6 TABLETS (18 MG TOTAL) BY MOUTH EVERY 14 (FOURTEEN) DAYS , Historical Med      ketotifen (ZADITOR) 0 025 % ophthalmic solution Administer 1 drop into the left eye 2 (two) times a day, Starting Thu 11/14/2019, Normal      lidocaine (LIDODERM) 5 % PLACE 1 PATCH ONTO THE SKIN DAILY   REMOVE & DISCARD PATCH WITHIN 12 HOURS OR AS DIRECTED BY MD, Historical Med      lovastatin (MEVACOR) 10 MG tablet Take 10 mg by mouth, Starting Mon 8/12/2019, Until Sat 8/7/2021 at 2359, Historical Med      meloxicam (MOBIC) 7 5 mg tablet Take 7 5 mg by mouth, Starting Wed 7/17/2019, Historical Med      metFORMIN (GLUCOPHAGE-XR) 500 mg 24 hr tablet Take 1 tablet by mouth, Starting Thu 5/9/2019, Historical Med      !! methocarbamol (ROBAXIN) 500 mg tablet Take 1 tablet (500 mg total) by mouth 3 (three) times a day, Starting Sun 9/20/2020, Normal      !! methocarbamol (ROBAXIN) 500 mg tablet Take 1 tablet (500 mg total) by mouth 4 (four) times a day, Starting Sat 8/7/2021, Normal      methylPREDNISolone 4 MG tablet therapy pack TAKE 6 TABLETS ON DAY 1 AS DIRECTED ON PACKAGE AND DECREASE BY 1 TAB EACH DAY FOR A TOTAL OF 6 DAYS, Historical Med      montelukast (SINGULAIR) 10 mg tablet Take 10 mg by mouth daily at bedtime, Historical Med      Naltrexone-buPROPion HCl ER (Contrave) 8-90 MG TB12 Week #1: 1 tab in morning, Week #2: 1 tab in the morning and 1 tab in afternoon, Week #3: 2 tab in morning and 1 tab in afternoon, Week #4 and thereafter: 2 tab in morning and 2 tab in afternoon, Historical Med      omeprazole (PriLOSEC) 10 mg delayed release capsule Take 20 mg by mouth, Historical Med      pantoprazole (PROTONIX) 20 mg tablet Take 20 mg by mouth daily, Historical Med      !! pramipexole (MIRAPEX) 1 mg tablet Take 0 25 mg by mouth 3 (three) times a day, Historical Med      !! pramipexole (MIRAPEX) 1 mg tablet Take 1 mg by mouth, Starting Mon 8/12/2019, Historical Med      !! predniSONE 10 mg tablet Take 6 pills day one, 5 pills day 2, 4 pills day 3, 3 pills day 4, 2 pills day 5, and 1 pill day 6 , Normal      !! predniSONE 10 mg tablet Take once daily all days pills on this schedule 6- 6- 5- 4- 3- 2- 1, Normal      !! predniSONE 10 mg tablet Take once daily all days pills on this schedule 6- 6- 5- 4- 3- 2- 1, Normal      !! predniSONE 10 mg tablet Take once daily all days pills on this schedule 6- 6- 5- 4- 3- 2- 1, Normal      rosuvastatin (CRESTOR) 10 MG tablet Take 10 mg by mouth, Starting Mon 5/2/2022, Historical Med      SUMAtriptan (IMITREX) 100 mg tablet Take 100 mg by mouth once as needed for migraine, Historical Med      traMADol (ULTRAM) 50 mg tablet Take 1 tablet (50 mg total) by mouth every 6 (six) hours as needed for moderate pain, Starting Fri 6/11/2021, Normal      venlafaxine (EFFEXOR-XR) 75 mg 24 hr capsule Take 75 mg by mouth, Starting Thu 6/6/2019, Historical Med       !! - Potential duplicate medications found  Please discuss with provider  No discharge procedures on file      PDMP Review       Value Time User    PDMP Reviewed  Yes 6/11/2021  2:30 PM Julia Campos MD          ED Provider  Electronically Signed by           Anya Kent MD  07/07/22 4569

## 2022-07-07 NOTE — CONSULTS
DERMATOLOGY:  [address] 46 y o  female MRN: [id number]  Unit/Bed#: ED 24 Encounter: [id number]      Consults    Provider to provider E-consult with store and forward photos in media tab  Did not see patient in person or virtually  Assessment/Recommendations   Based on a thorough discussion of this condition and the management approach to it, the following specific plan is recommended:    · Recommendations include a preference for strong topical steroid such as clobetasol ointment 0 05% twice a day for 2 weeks  Not to be used on the skin of the face or genitals  · If an oral steroid is required, a much longer course of PO prednisone is recommended for contact dermatitis to offset the rebound effect, from which this patient could currently be suffering from given her history of likely contact dermatitis and recent short PO steroid course  If this is the case, would recommend starting with prednisone 60 mg PO x5 days followed by 10 mg decrease every 5 days until 10 mg daily is reached  After which time the patient would require a slow taper of 9 mg x5 days, 8 mg x5 days, 7 mg x5 days, etc until she is weaned completely  · This rash does not appear to be scabies given clinical images, history, and recent treatment with topical permethrin  If the patient requests outpatient dermatology follow up on discharge, please follow up by calling our office: [phone])     Thank you for involving me in the care of your patient  Please call with questions, change in clinical status or if tests recommended above are abnormal      Discussed with the primary service  History:     HISTORY OF PRESENT ILLNESS:    Reason for Consult: rash   HPI: Buster  is a 46y o  year old female with a three week history of extremely pruritic rash of the "whole body" but concentrated on the arms, hands and thorax   The rash consists of erythematous papules with some crusting of the dorsal hands, ventral wrist, and lateral abdomen  She first developed this approximately three weeks ago following a day of cleaning out plants from below her deck  Her son also has a similar rash  She initially presented to urgent care on  and was prescribed a 7 day PO prednisone taper starting at 60 mg daily for suspected contact dermatitis, and was also prescribed PO ivermectin for suspected scabies given the distribution of the rash  A few days later she was prescribed topical clotrimazole 1% given recent history of intertrigo under the breasts  This provided no relief  She presented to the ED for worsening of the rash and continued severe pruritis  ROS:  Negative other than outlined above in HPI      PAST MEDICAL HISTORY:  Past Medical History:   Diagnosis Date    Angioedema     Anxiety     Arthritis     Diverticulitis     Hypertension     Migraine     Overactive bladder     Restless leg      Past Surgical History:   Procedure Laterality Date    ADENOIDECTOMY      BARIATRIC SURGERY      CARPAL TUNNEL RELEASE  2021    BL    HYSTERECTOMY      TONSILLECTOMY         FAMILY HISTORY:  Non-contributory    SOCIAL HISTORY:  Social History   Unknown  Social History     Substance and Sexual Activity   Alcohol Use No     Social History     Substance and Sexual Activity   Drug Use No     Social History     Tobacco Use   Smoking Status Never Smoker   Smokeless Tobacco Never Used       ALLERGIES:  Allergies   Allergen Reactions    Dog Epithelium Swelling    Ibuprofen Swelling    Lisinopril Swelling    Red Dye - Food Allergy Swelling    Latex Rash       MEDICATIONS:  All current active medications have been reviewed         Physical exam:     Temp:  [98 5 °F (36 9 °C)] 98 5 °F (36 9 °C)  HR:  [99] 99  Resp:  [18] 18  BP: (142-172)/() 142/91  SpO2:  [98 %] 98 %  Temp (24hrs), Av 5 °F (36 9 °C), Min:98 5 °F (36 9 °C), Max:98 5 °F (36 9 °C)  Current: Temperature: 98 5 °F (36 9 °C)    Patient was not examined as this was a virtual provider to provider consult     Photos of the offending rash were reviewed in the media tab  Labs, Imaging, & Other Studies     Lab Results:  I have personally reviewed pertinent labs       Results from last 7 days   Lab Units 07/07/22  1051   WBC Thousand/uL 7 31   HEMOGLOBIN g/dL 14 6   PLATELETS Thousands/uL 332     Results from last 7 days   Lab Units 07/07/22  1051   POTASSIUM mmol/L 4 4   CHLORIDE mmol/L 103   CO2 mmol/L 30   BUN mg/dL 23   CREATININE mg/dL 0 77   EGFR ml/min/1 73sq m 89   CALCIUM mg/dL 9 5   AST U/L 16   ALT U/L 28   ALK PHOS U/L 65

## 2022-10-12 ENCOUNTER — OFFICE VISIT (OUTPATIENT)
Dept: URGENT CARE | Facility: CLINIC | Age: 52
End: 2022-10-12
Payer: COMMERCIAL

## 2022-10-12 VITALS
SYSTOLIC BLOOD PRESSURE: 162 MMHG | OXYGEN SATURATION: 98 % | RESPIRATION RATE: 18 BRPM | BODY MASS INDEX: 33.68 KG/M2 | DIASTOLIC BLOOD PRESSURE: 100 MMHG | TEMPERATURE: 98.8 F | HEIGHT: 62 IN | HEART RATE: 102 BPM | WEIGHT: 183 LBS

## 2022-10-12 DIAGNOSIS — J01.00 ACUTE MAXILLARY SINUSITIS, RECURRENCE NOT SPECIFIED: ICD-10-CM

## 2022-10-12 DIAGNOSIS — J02.9 ACUTE PHARYNGITIS, UNSPECIFIED ETIOLOGY: ICD-10-CM

## 2022-10-12 DIAGNOSIS — Z20.822 ENCOUNTER FOR LABORATORY TESTING FOR COVID-19 VIRUS: Primary | ICD-10-CM

## 2022-10-12 LAB
SARS-COV-2 AG UPPER RESP QL IA: NEGATIVE
VALID CONTROL: NORMAL

## 2022-10-12 PROCEDURE — 99213 OFFICE O/P EST LOW 20 MIN: CPT | Performed by: PHYSICIAN ASSISTANT

## 2022-10-12 PROCEDURE — 87811 SARS-COV-2 COVID19 W/OPTIC: CPT | Performed by: PHYSICIAN ASSISTANT

## 2022-10-12 RX ORDER — EZETIMIBE 10 MG/1
10 TABLET ORAL DAILY
COMMUNITY
Start: 2022-10-06 | End: 2023-10-06

## 2022-10-12 RX ORDER — IVERMECTIN 3 MG/1
18 TABLET ORAL
COMMUNITY
Start: 2022-06-30

## 2022-10-12 RX ORDER — PHENTERMINE HYDROCHLORIDE 37.5 MG/1
37.5 TABLET ORAL
COMMUNITY
Start: 2022-10-06 | End: 2022-11-05

## 2022-10-12 RX ORDER — AMLODIPINE AND VALSARTAN 10; 320 MG/1; MG/1
TABLET ORAL
COMMUNITY
Start: 2022-10-06

## 2022-10-12 RX ORDER — AMOXICILLIN 500 MG/1
500 CAPSULE ORAL EVERY 8 HOURS SCHEDULED
Qty: 30 CAPSULE | Refills: 0 | Status: SHIPPED | OUTPATIENT
Start: 2022-10-12 | End: 2022-10-22

## 2022-10-12 RX ORDER — ACETAMINOPHEN AND CODEINE PHOSPHATE 300; 15 MG/1; MG/1
TABLET ORAL
COMMUNITY
Start: 2022-07-21

## 2022-10-12 NOTE — PROGRESS NOTES
[facility]        NAME: Apolonia Rangel is a 46 y o  female  : 1970    MRN: [id number]  DATE: 2022  TIME: 9:58 AM    /100   Pulse 102   Temp 98 8 °F (37 1 °C)   Resp 18   Ht 5' 2" (1 575 m)   Wt 83 kg (183 lb)   SpO2 98%   BMI 33 47 kg/m²     Assessment and Plan   Encounter for laboratory testing for COVID-19 virus [Z20 822]  1  Encounter for laboratory testing for COVID-19 virus  Poct Covid 19 Rapid Antigen Test    amoxicillin (AMOXIL) 500 mg capsule   2  Acute maxillary sinusitis, recurrence not specified  amoxicillin (AMOXIL) 500 mg capsule   3  Acute pharyngitis, unspecified etiology  amoxicillin (AMOXIL) 500 mg capsule         Patient Instructions       Follow up with PCP in 3-5 days  Proceed to  ER if symptoms worsen  Chief Complaint     Chief Complaint   Patient presents with   • Cough     Runny nose, sore throat and cough  Son positive for covid         History of Present Illness       Pt with  cough and sinus congestion sore throat for about 2 days       Review of Systems   Review of Systems   Constitutional: Negative  HENT: Positive for congestion, sinus pressure and sinus pain  Eyes: Negative  Respiratory: Positive for cough  Cardiovascular: Negative  Gastrointestinal: Negative  Endocrine: Negative  Genitourinary: Negative  Musculoskeletal: Negative  Allergic/Immunologic: Negative  Neurological: Negative  Hematological: Negative  Psychiatric/Behavioral: Negative  All other systems reviewed and are negative  Current Medications       Current Outpatient Medications:   •  acetaminophen-codeine (TYLENOL with CODEINE #2) 300-15 MG, TAKE 1 TABLET BY MOUTH DAILY AS NEEDED FOR MODERATE PAIN (PAIN SCORE 4-6)  , Disp: , Rfl:   •  albuterol (ProAir HFA) 90 mcg/act inhaler, Inhale 2 puffs every 6 (six) hours as needed for shortness of breath Dispense 1 spacer, Disp: 8 5 g, Rfl: 0  •  amLODIPine (NORVASC) 10 mg tablet, Take 10 mg by mouth daily, Disp: , Rfl:   •  amLODIPine-valsartan (EXFORGE)  MG per tablet, , Disp: , Rfl:   •  amoxicillin (AMOXIL) 500 mg capsule, Take 1 capsule (500 mg total) by mouth every 8 (eight) hours for 10 days, Disp: 30 capsule, Rfl: 0  •  atenolol (TENORMIN) 25 mg tablet, TAKE 1 TABLET (25 MG TOTAL) BY MOUTH DAILY  CANCEL CLONIDINE, Disp: , Rfl: 0  •  cloNIDine (CATAPRES) 0 2 mg tablet, Take 0 2 mg by mouth daily at bedtime, Disp: , Rfl:   •  clotrimazole (LOTRIMIN) 1 % cream, Apply topically 2 (two) times a day, Disp: 30 g, Rfl: 0  •  cyclobenzaprine (FLEXERIL) 10 mg tablet, Take 1 tablet (10 mg total) by mouth 3 (three) times a day as needed for muscle spasms, Disp: 20 tablet, Rfl: 0  •  diphenhydrAMINE (BENADRYL) 25 mg tablet, Take 1 tablet (25 mg total) by mouth every 6 (six) hours as needed for itching, Disp: 20 tablet, Rfl: 0  •  EPINEPHrine (EPIPEN) 0 3 mg/0 3 mL SOAJ, , Disp: , Rfl:   •  ezetimibe (ZETIA) 10 mg tablet, Take 10 mg by mouth daily, Disp: , Rfl:   •  famotidine (PEPCID) 20 mg tablet, Take 1 tablet (20 mg total) by mouth daily at bedtime for 14 days, Disp: 14 tablet, Rfl: 0  •  fluticasone (FLONASE) 50 mcg/act nasal spray, 1 spray into each nostril daily for 14 days, Disp: 1 g, Rfl: 1  •  gabapentin (NEURONTIN) 300 mg capsule, Take 300 mg by mouth daily at bedtime as needed, Disp: , Rfl:   •  ivermectin (STROMECTOL) 3 MG TABS, TAKE 6 TABLETS (18 MG TOTAL) BY MOUTH EVERY 14 (FOURTEEN) DAYS , Disp: , Rfl:   •  ivermectin (STROMECTOL) 3 MG TABS, Take 18 mg by mouth, Disp: , Rfl:   •  lidocaine (LIDODERM) 5 %, PLACE 1 PATCH ONTO THE SKIN DAILY   REMOVE & DISCARD PATCH WITHIN 12 HOURS OR AS DIRECTED BY MD, Disp: , Rfl:   •  phentermine (ADIPEX-P) 37 5 MG tablet, Take 37 5 mg by mouth, Disp: , Rfl:   •  pramipexole (MIRAPEX) 1 mg tablet, Take 0 25 mg by mouth 3 (three) times a day, Disp: , Rfl:   •  pramipexole (MIRAPEX) 1 mg tablet, Take 1 mg by mouth, Disp: , Rfl:   •  rosuvastatin (CRESTOR) 10 MG tablet, Take 10 mg by mouth, Disp: , Rfl:   •  SUMAtriptan (IMITREX) 100 mg tablet, Take 100 mg by mouth once as needed for migraine, Disp: , Rfl:   •  traMADol (ULTRAM) 50 mg tablet, Take 1 tablet (50 mg total) by mouth every 6 (six) hours as needed for moderate pain, Disp: 8 tablet, Rfl: 0  •  venlafaxine (EFFEXOR-XR) 75 mg 24 hr capsule, Take 75 mg by mouth, Disp: , Rfl:   •  buPROPion (WELLBUTRIN XL) 150 mg 24 hr tablet, Take 150 mg by mouth, Disp: , Rfl:   •  clobetasol (TEMOVATE) 0 05 % ointment, Apply topically 2 (two) times a day Not on face or genitals, Disp: 45 g, Rfl: 0  •  ergocalciferol (VITAMIN D2) 50,000 units, TAKE 1 CAPSULE BY MOUTH ONE TIME PER WEEK (Patient not taking: Reported on 10/12/2022), Disp: , Rfl:   •  Fesoterodine Fumarate ER 4 MG TB24, Take 1 tablet (4 mg total) by mouth daily at bedtime (Patient not taking: No sig reported), Disp: 30 tablet, Rfl: 1  •  gabapentin (NEURONTIN) 600 MG tablet, TAKE 1 Tablet BY MOUTH TWICE DAILY FOR PAIN AND SLEEP, Disp: , Rfl: 0  •  hydroxychloroquine (PLAQUENIL) 200 mg tablet, Take 400 mg by mouth daily, Disp: , Rfl: 5  •  hydrOXYzine HCL (ATARAX) 25 mg tablet, Take 25 mg by mouth every 6 (six) hours as needed for itching (Patient not taking: No sig reported), Disp: , Rfl:   •  ketotifen (ZADITOR) 0 025 % ophthalmic solution, Administer 1 drop into the left eye 2 (two) times a day (Patient not taking: No sig reported), Disp: 5 mL, Rfl: 0  •  lovastatin (MEVACOR) 10 MG tablet, Take 10 mg by mouth, Disp: , Rfl:   •  meloxicam (MOBIC) 7 5 mg tablet, Take 7 5 mg by mouth, Disp: , Rfl:   •  metFORMIN (GLUCOPHAGE-XR) 500 mg 24 hr tablet, Take 1 tablet by mouth, Disp: , Rfl:   •  methocarbamol (ROBAXIN) 500 mg tablet, Take 1 tablet (500 mg total) by mouth 3 (three) times a day, Disp: 20 tablet, Rfl: 0  •  methocarbamol (ROBAXIN) 500 mg tablet, Take 1 tablet (500 mg total) by mouth 4 (four) times a day, Disp: 30 tablet, Rfl: 0  • methylPREDNISolone 4 MG tablet therapy pack, , Disp: , Rfl:   •  montelukast (SINGULAIR) 10 mg tablet, Take 10 mg by mouth daily at bedtime, Disp: , Rfl:   •  Naltrexone-buPROPion HCl ER (Contrave) 8-90 MG TB12, Week #1: 1 tab in morning, Week #2: 1 tab in the morning and 1 tab in afternoon, Week #3: 2 tab in morning and 1 tab in afternoon, Week #4 and thereafter: 2 tab in morning and 2 tab in afternoon (Patient not taking: Reported on 7/3/2022), Disp: , Rfl:   •  omeprazole (PriLOSEC) 10 mg delayed release capsule, Take 20 mg by mouth, Disp: , Rfl:   •  pantoprazole (PROTONIX) 20 mg tablet, Take 20 mg by mouth daily (Patient not taking: Reported on 10/12/2022), Disp: , Rfl:   •  predniSONE 10 mg tablet, Take 6 pills day one, 5 pills day 2, 4 pills day 3, 3 pills day 4, 2 pills day 5, and 1 pill day 6   (Patient not taking: Reported on 8/22/2020), Disp: 21 tablet, Rfl: 0  •  predniSONE 10 mg tablet, Take once daily all days pills on this schedule 6- 6- 5- 4- 3- 2- 1, Disp: 27 tablet, Rfl: 0  •  predniSONE 10 mg tablet, Take once daily all days pills on this schedule 6- 6- 5- 4- 3- 2- 1, Disp: 27 tablet, Rfl: 0  •  predniSONE 10 mg tablet, Take once daily all days pills on this schedule 6- 6- 5- 4- 3- 2- 1, Disp: 27 tablet, Rfl: 0    Current Allergies     Allergies as of 10/12/2022 - Reviewed 10/12/2022   Allergen Reaction Noted   • Dog epithelium Swelling 11/14/2019   • Ibuprofen Swelling 03/15/2018   • Lisinopril Swelling 03/15/2018   • Red dye - food allergy Swelling 03/15/2018   • Latex Rash 03/15/2018            The following portions of the patient's history were reviewed and updated as appropriate: allergies, current medications, past family history, past medical history, past social history, past surgical history and problem list      Past Medical History:   Diagnosis Date   • Angioedema    • Anxiety    • Arthritis    • Diverticulitis    • Fibromyalgia    • Hypertension    • Migraine    • Obesity    • Overactive bladder    • Restless leg        Past Surgical History:   Procedure Laterality Date   • ADENOIDECTOMY     • BARIATRIC SURGERY     • CARPAL TUNNEL RELEASE  01/07/2021    BL   • HYSTERECTOMY     • TONSILLECTOMY         Family History   Problem Relation Age of Onset   • Diabetes Mother    • Hypertension Mother    • Heart disease Father    • Hypertension Father          Medications have been verified  Objective   /100   Pulse 102   Temp 98 8 °F (37 1 °C)   Resp 18   Ht 5' 2" (1 575 m)   Wt 83 kg (183 lb)   SpO2 98%   BMI 33 47 kg/m²        Physical Exam     Physical Exam  Vitals and nursing note reviewed  Constitutional:       Appearance: Normal appearance  She is normal weight  Comments: Family covid +   HENT:      Head: Normocephalic and atraumatic  Right Ear: Tympanic membrane, ear canal and external ear normal       Left Ear: Tympanic membrane, ear canal and external ear normal       Nose: Congestion and rhinorrhea present  Comments: Boggy mucosa  Max sinus pressure      Mouth/Throat:      Mouth: Mucous membranes are moist       Pharynx: Oropharynx is clear  Eyes:      Extraocular Movements: Extraocular movements intact  Conjunctiva/sclera: Conjunctivae normal       Pupils: Pupils are equal, round, and reactive to light  Cardiovascular:      Rate and Rhythm: Normal rate and regular rhythm  Pulses: Normal pulses  Heart sounds: Normal heart sounds  Pulmonary:      Effort: Pulmonary effort is normal       Comments: Minor coarse sounds   Abdominal:      General: Abdomen is flat  Bowel sounds are normal       Palpations: Abdomen is soft  Musculoskeletal:         General: Normal range of motion  Cervical back: Normal range of motion and neck supple  Skin:     General: Skin is warm  Capillary Refill: Capillary refill takes less than 2 seconds  Neurological:      General: No focal deficit present        Mental Status: She is alert and oriented to person, place, and time     Psychiatric:         Mood and Affect: Mood normal          Behavior: Behavior normal

## 2023-11-20 ENCOUNTER — HOSPITAL ENCOUNTER (EMERGENCY)
Facility: HOSPITAL | Age: 53
Discharge: HOME | End: 2023-11-20
Attending: EMERGENCY MEDICINE
Payer: COMMERCIAL

## 2023-11-20 ENCOUNTER — OFFICE VISIT (OUTPATIENT)
Dept: URGENT CARE | Facility: CLINIC | Age: 53
End: 2023-11-20
Payer: MEDICARE

## 2023-11-20 VITALS
TEMPERATURE: 97.2 F | DIASTOLIC BLOOD PRESSURE: 91 MMHG | HEIGHT: 64 IN | HEART RATE: 73 BPM | BODY MASS INDEX: 36.89 KG/M2 | WEIGHT: 216.05 LBS | RESPIRATION RATE: 20 BRPM | OXYGEN SATURATION: 97 % | SYSTOLIC BLOOD PRESSURE: 191 MMHG

## 2023-11-20 VITALS
HEIGHT: 64 IN | TEMPERATURE: 97.4 F | DIASTOLIC BLOOD PRESSURE: 100 MMHG | HEART RATE: 96 BPM | SYSTOLIC BLOOD PRESSURE: 192 MMHG | RESPIRATION RATE: 24 BRPM | OXYGEN SATURATION: 100 % | BODY MASS INDEX: 31.41 KG/M2

## 2023-11-20 DIAGNOSIS — J02.9 VIRAL PHARYNGITIS: Primary | ICD-10-CM

## 2023-11-20 DIAGNOSIS — T78.3XXA ANGIOEDEMA, INITIAL ENCOUNTER: ICD-10-CM

## 2023-11-20 DIAGNOSIS — M25.511 ACUTE PAIN OF RIGHT SHOULDER: ICD-10-CM

## 2023-11-20 DIAGNOSIS — R22.1 THROAT SWELLING: Primary | ICD-10-CM

## 2023-11-20 LAB
ATRIAL RATE: 76 BPM
ATRIAL RATE: 76 BPM
FLUAV RNA RESP QL NAA+PROBE: NEGATIVE
FLUBV RNA RESP QL NAA+PROBE: NEGATIVE
P AXIS: 35 DEGREES
P AXIS: 35 DEGREES
PR INTERVAL: 174 MS
PR INTERVAL: 174 MS
QRS AXIS: 25 DEGREES
QRS AXIS: 25 DEGREES
QRSD INTERVAL: 88 MS
QRSD INTERVAL: 88 MS
QT INTERVAL: 384 MS
QT INTERVAL: 384 MS
QTC INTERVAL: 432 MS
QTC INTERVAL: 432 MS
RSV RNA RESP QL NAA+PROBE: NEGATIVE
S PYO DNA THROAT QL NAA+PROBE: NOT DETECTED
SARS-COV-2 RNA RESP QL NAA+PROBE: NEGATIVE
T WAVE AXIS: 92 DEGREES
T WAVE AXIS: 92 DEGREES
VENTRICULAR RATE: 76 BPM
VENTRICULAR RATE: 76 BPM

## 2023-11-20 PROCEDURE — 93010 ELECTROCARDIOGRAM REPORT: CPT | Performed by: STUDENT IN AN ORGANIZED HEALTH CARE EDUCATION/TRAINING PROGRAM

## 2023-11-20 PROCEDURE — 87651 STREP A DNA AMP PROBE: CPT | Performed by: EMERGENCY MEDICINE

## 2023-11-20 PROCEDURE — 99213 OFFICE O/P EST LOW 20 MIN: CPT | Performed by: PHYSICIAN ASSISTANT

## 2023-11-20 PROCEDURE — 93005 ELECTROCARDIOGRAM TRACING: CPT | Performed by: PHYSICIAN ASSISTANT

## 2023-11-20 PROCEDURE — 99283 EMERGENCY DEPT VISIT LOW MDM: CPT

## 2023-11-20 PROCEDURE — 99284 EMERGENCY DEPT VISIT MOD MDM: CPT | Performed by: EMERGENCY MEDICINE

## 2023-11-20 PROCEDURE — 96374 THER/PROPH/DIAG INJ IV PUSH: CPT

## 2023-11-20 PROCEDURE — 0241U HB NFCT DS VIR RESP RNA 4 TRGT: CPT | Performed by: EMERGENCY MEDICINE

## 2023-11-20 RX ORDER — PREDNISONE 20 MG/1
40 TABLET ORAL DAILY
Qty: 10 TABLET | Refills: 0 | Status: SHIPPED | OUTPATIENT
Start: 2023-11-21 | End: 2023-11-26

## 2023-11-20 RX ORDER — CLONIDINE HYDROCHLORIDE 0.1 MG/1
0.2 TABLET ORAL ONCE
Status: COMPLETED | OUTPATIENT
Start: 2023-11-20 | End: 2023-11-20

## 2023-11-20 RX ORDER — DEXAMETHASONE SODIUM PHOSPHATE 10 MG/ML
8 INJECTION, SOLUTION INTRAMUSCULAR; INTRAVENOUS ONCE
Status: COMPLETED | OUTPATIENT
Start: 2023-11-20 | End: 2023-11-20

## 2023-11-20 RX ADMIN — CLONIDINE HYDROCHLORIDE 0.2 MG: 0.1 TABLET ORAL at 10:24

## 2023-11-20 RX ADMIN — DEXAMETHASONE SODIUM PHOSPHATE 8 MG: 10 INJECTION, SOLUTION INTRAMUSCULAR; INTRAVENOUS at 10:25

## 2023-11-20 NOTE — PROGRESS NOTES
[facility]        NAME: Camilla Encinas is a 48 y.o. female  : 1970    MRN: [id number]  DATE: 2023  TIME: 9:20 AM    Assessment and Plan   Throat swelling [R22.1]  1. Throat swelling  Transfer to other facility      2. Acute pain of right shoulder  ECG 12 lead            Patient Instructions     Patient Instructions   Recommend patient be seen and evaluated further at [address] [place] [address] emergency department for sensation of throat swelling and previous anaphylactic episode. As well as for evaluation of head pressure and right shoulder pain. .  Patient is being transported via EMS. Follow up with PCP in 3-5 days. Proceed to  ER if symptoms worsen. Chief Complaint     Chief Complaint   Patient presents with    Throat swelling       Throat swelling since this morning with enlarged tonsils. Blood pressure elevated with head pressure          History of Present Illness       Patient is a 55-year-old female presenting today with sensation of throat swelling x3 hours. Patient notes she awoke this morning feeling like her throat was closing, states she has had a couple episodes of this over the past few years in which she has had to be seen in the emergency department, notes she has been given epinephrine in the past due to a similar presentation a few years ago. Also notes she has some head pressure and right shoulder pain which started about an hour ago. Denies chest tightness, SOB, lightheadedness, dizziness, drooling. Review of Systems   Review of Systems   Constitutional:  Negative for chills and fever. HENT:  Positive for sore throat, trouble swallowing and voice change. Negative for ear pain. Eyes:  Negative for pain and visual disturbance. Respiratory:  Negative for cough and shortness of breath. Cardiovascular:  Negative for chest pain and palpitations. Gastrointestinal:  Negative for abdominal pain and vomiting.    Genitourinary:  Negative for dysuria and hematuria. Musculoskeletal:         See HPI   Skin:  Negative for color change and rash. Neurological:  Positive for headaches. Negative for seizures and syncope. All other systems reviewed and are negative. Current Medications       Current Outpatient Medications:     albuterol (ProAir HFA) 90 mcg/act inhaler, Inhale 2 puffs every 6 (six) hours as needed for shortness of breath Dispense 1 spacer, Disp: 8.5 g, Rfl: 0    amLODIPine (NORVASC) 10 mg tablet, Take 10 mg by mouth daily, Disp: , Rfl:     amLODIPine-valsartan (EXFORGE)  MG per tablet, , Disp: , Rfl:     atenolol (TENORMIN) 25 mg tablet, TAKE 1 TABLET (25 MG TOTAL) BY MOUTH DAILY. CANCEL CLONIDINE, Disp: , Rfl: 0    clobetasol (TEMOVATE) 0.05 % ointment, Apply topically 2 (two) times a day Not on face or genitals, Disp: 45 g, Rfl: 0    cloNIDine (CATAPRES) 0.2 mg tablet, Take 0.2 mg by mouth daily at bedtime, Disp: , Rfl:     clotrimazole (LOTRIMIN) 1 % cream, Apply topically 2 (two) times a day, Disp: 30 g, Rfl: 0    cyclobenzaprine (FLEXERIL) 10 mg tablet, Take 1 tablet (10 mg total) by mouth 3 (three) times a day as needed for muscle spasms, Disp: 20 tablet, Rfl: 0    diphenhydrAMINE (BENADRYL) 25 mg tablet, Take 1 tablet (25 mg total) by mouth every 6 (six) hours as needed for itching, Disp: 20 tablet, Rfl: 0    EPINEPHrine (EPIPEN) 0.3 mg/0.3 mL SOAJ, , Disp: , Rfl:     gabapentin (NEURONTIN) 300 mg capsule, Take 300 mg by mouth daily at bedtime as needed, Disp: , Rfl:     ivermectin (STROMECTOL) 3 MG TABS, TAKE 6 TABLETS (18 MG TOTAL) BY MOUTH EVERY 14 (FOURTEEN) DAYS., Disp: , Rfl:     ivermectin (STROMECTOL) 3 MG TABS, Take 18 mg by mouth, Disp: , Rfl:     ketotifen (ZADITOR) 0.025 % ophthalmic solution, Administer 1 drop into the left eye 2 (two) times a day, Disp: 5 mL, Rfl: 0    lidocaine (LIDODERM) 5 %, PLACE 1 PATCH ONTO THE SKIN DAILY.  REMOVE & DISCARD PATCH WITHIN 12 HOURS OR AS DIRECTED BY MD, Disp: , Rfl: methocarbamol (ROBAXIN) 500 mg tablet, Take 1 tablet (500 mg total) by mouth 3 (three) times a day, Disp: 20 tablet, Rfl: 0    methocarbamol (ROBAXIN) 500 mg tablet, Take 1 tablet (500 mg total) by mouth 4 (four) times a day, Disp: 30 tablet, Rfl: 0    Naltrexone-buPROPion HCl ER (Contrave) 8-90 MG TB12, , Disp: , Rfl:     phentermine (ADIPEX-P) 37.5 MG tablet, Take 37.5 mg by mouth, Disp: , Rfl:     pramipexole (MIRAPEX) 1 mg tablet, Take 0.25 mg by mouth 3 (three) times a day, Disp: , Rfl:     pramipexole (MIRAPEX) 1 mg tablet, Take 1 mg by mouth, Disp: , Rfl:     rosuvastatin (CRESTOR) 10 MG tablet, Take 10 mg by mouth, Disp: , Rfl:     SUMAtriptan (IMITREX) 100 mg tablet, Take 100 mg by mouth once as needed for migraine, Disp: , Rfl:     traMADol (ULTRAM) 50 mg tablet, Take 1 tablet (50 mg total) by mouth every 6 (six) hours as needed for moderate pain, Disp: 8 tablet, Rfl: 0    venlafaxine (EFFEXOR-XR) 75 mg 24 hr capsule, Take 75 mg by mouth, Disp: , Rfl:     acetaminophen-codeine (TYLENOL with CODEINE #2) 300-15 MG, TAKE 1 TABLET BY MOUTH DAILY AS NEEDED FOR MODERATE PAIN (PAIN SCORE 4-6).  (Patient not taking: Reported on 11/20/2023), Disp: , Rfl:     buPROPion (WELLBUTRIN XL) 150 mg 24 hr tablet, Take 150 mg by mouth, Disp: , Rfl:     ergocalciferol (VITAMIN D2) 50,000 units, TAKE 1 CAPSULE BY MOUTH ONE TIME PER WEEK (Patient not taking: Reported on 10/12/2022), Disp: , Rfl:     ezetimibe (ZETIA) 10 mg tablet, Take 10 mg by mouth daily, Disp: , Rfl:     famotidine (PEPCID) 20 mg tablet, Take 1 tablet (20 mg total) by mouth daily at bedtime for 14 days, Disp: 14 tablet, Rfl: 0    Fesoterodine Fumarate ER 4 MG TB24, Take 1 tablet (4 mg total) by mouth daily at bedtime (Patient not taking: Reported on 7/3/2022), Disp: 30 tablet, Rfl: 1    fluticasone (FLONASE) 50 mcg/act nasal spray, 1 spray into each nostril daily for 14 days, Disp: 1 g, Rfl: 1    gabapentin (NEURONTIN) 600 MG tablet, TAKE 1 Tablet BY MOUTH TWICE DAILY FOR PAIN AND SLEEP, Disp: , Rfl: 0    hydroxychloroquine (PLAQUENIL) 200 mg tablet, Take 400 mg by mouth daily, Disp: , Rfl: 5    hydrOXYzine HCL (ATARAX) 25 mg tablet, Take 25 mg by mouth every 6 (six) hours as needed for itching (Patient not taking: Reported on 7/3/2022), Disp: , Rfl:     lovastatin (MEVACOR) 10 MG tablet, Take 10 mg by mouth, Disp: , Rfl:     meloxicam (MOBIC) 7.5 mg tablet, Take 7.5 mg by mouth, Disp: , Rfl:     metFORMIN (GLUCOPHAGE-XR) 500 mg 24 hr tablet, Take 1 tablet by mouth, Disp: , Rfl:     methylPREDNISolone 4 MG tablet therapy pack, , Disp: , Rfl:     montelukast (SINGULAIR) 10 mg tablet, Take 10 mg by mouth daily at bedtime, Disp: , Rfl:     omeprazole (PriLOSEC) 10 mg delayed release capsule, Take 20 mg by mouth, Disp: , Rfl:     pantoprazole (PROTONIX) 20 mg tablet, Take 20 mg by mouth daily (Patient not taking: Reported on 11/20/2023), Disp: , Rfl:     predniSONE 10 mg tablet, Take 6 pills day one, 5 pills day 2, 4 pills day 3, 3 pills day 4, 2 pills day 5, and 1 pill day 6.  (Patient not taking: Reported on 8/22/2020), Disp: 21 tablet, Rfl: 0    predniSONE 10 mg tablet, Take once daily all days pills on this schedule 6- 6- 5- 4- 3- 2- 1 (Patient not taking: Reported on 11/20/2023), Disp: 27 tablet, Rfl: 0    predniSONE 10 mg tablet, Take once daily all days pills on this schedule 6- 6- 5- 4- 3- 2- 1 (Patient not taking: Reported on 11/20/2023), Disp: 27 tablet, Rfl: 0    predniSONE 10 mg tablet, Take once daily all days pills on this schedule 6- 6- 5- 4- 3- 2- 1 (Patient not taking: Reported on 11/20/2023), Disp: 27 tablet, Rfl: 0    Current Allergies     Allergies as of 11/20/2023 - Reviewed 11/20/2023   Allergen Reaction Noted    Dog epithelium Swelling 11/14/2019    Ibuprofen Swelling 03/15/2018    Lisinopril Swelling 03/15/2018    Red dye - food allergy Swelling 03/15/2018    Latex Rash 03/15/2018            The following portions of the patient's history were reviewed and updated as appropriate: allergies, current medications, past family history, past medical history, past social history, past surgical history and problem list.     Past Medical History:   Diagnosis Date    Angioedema     Anxiety     Arthritis     Diverticulitis     Fibromyalgia     Hypertension     Migraine     Obesity     Overactive bladder     Restless leg        Past Surgical History:   Procedure Laterality Date    ADENOIDECTOMY      BARIATRIC SURGERY      CARPAL TUNNEL RELEASE  01/07/2021    BL    HYSTERECTOMY      TONSILLECTOMY         Family History   Problem Relation Age of Onset    Diabetes Mother     Hypertension Mother     Heart disease Father     Hypertension Father          Medications have been verified. Objective   BP (!) 192/100   Pulse 96   Temp (!) 97.4 °F (36.3 °C) (Tympanic)   Resp (!) 24   Ht 5' 4" (1.626 m)   SpO2 100%   BMI 31.41 kg/m²        Physical Exam     Physical Exam  Vitals reviewed. Constitutional:       General: She is in acute distress. HENT:      Head: Normocephalic. Mouth/Throat:      Mouth: Mucous membranes are moist.      Comments: Mild erythema of posterior oropharynx, visible swelling to oropharynx, no tonsillar swelling or exudate, airway is preserved however appears narrowed  Eyes:      Conjunctiva/sclera: Conjunctivae normal.   Cardiovascular:      Rate and Rhythm: Normal rate and regular rhythm. Pulses: Normal pulses. Heart sounds: Normal heart sounds. Pulmonary:      Effort: Pulmonary effort is normal.      Breath sounds: Normal breath sounds. Skin:     General: Skin is warm. Capillary Refill: Capillary refill takes less than 2 seconds. Neurological:      General: No focal deficit present. Mental Status: She is oriented to person, place, and time.

## 2023-11-20 NOTE — DISCHARGE INSTRUCTIONS
Take steroids as prescribed starting tomorrow. Take your blood pressure medications when you return home. If you have any concerns or worsening symptoms return to the emergency room for further evaluation. Thank you for choosing the emergency department at [facility]. We appreciated the opportunity and privilege to address your healthcare needs. We remain available to you should you require additional evaluation or assistance. We value your feedback and would appreciate the opportunity to address anything you identified as an opportunity to improve or where we excelled. If there are colleagues who deserve special recognition, please let us know! We hope you are feeling better soon! Please also note that sometimes there are subtle abnormalities in your lab values that you may observe when you access your record online. These are frequently not worrisome and if they are of concern we will have discussed them with you. However, we always encourage that you discuss any concerns you may have or observe on your record with your primary care provider. Please also be aware that voice transcription will occasionally recognize words or grammar differently than what was spoken.

## 2023-11-20 NOTE — ED PROVIDER NOTES
History  Chief Complaint   Patient presents with    Sore Throat     Seen at at urgent care today for sore throat and numbness in arm with intermittent head pressure upon awakening this am.   Ems reports urgent care endorses "swollen tonsils"  denies shortness of breath on arrival.  No acute distress noted,     Patient is a 57-year-old female who was sent to us by urgent care for sore throat and swollen glands. They also report the patient had numbness in her arms and headache. Patient has had chronic arthralgias. Patient reports that the primary reason she presented to the urgent care was because she was having sore throat and swollen glands and was previously seen for allergic reaction in the past.  Patient feels as if her voice is changed. She did not take her medications this morning because she could not swallow secondary to pain. History provided by:  Patient  Sore Throat  Location:  Generalized  Quality:  Aching and burning  Associated symptoms: trouble swallowing and voice change    Associated symptoms: no abdominal pain, no adenopathy, no drooling, no ear discharge, no shortness of breath and no stridor        Prior to Admission Medications   Prescriptions Last Dose Informant Patient Reported? Taking? EPINEPHrine (EPIPEN) 0.3 mg/0.3 mL SOAJ   Yes Yes   Fesoterodine Fumarate ER 4 MG TB24 Not Taking  No No   Sig: Take 1 tablet (4 mg total) by mouth daily at bedtime   Patient not taking: Reported on 7/3/2022   Naltrexone-buPROPion HCl ER (Contrave) 8-90 MG TB12 Not Taking  Yes No   Patient not taking: Reported on 11/20/2023   SUMAtriptan (IMITREX) 100 mg tablet  Self Yes Yes   Sig: Take 100 mg by mouth once as needed for migraine   acetaminophen-codeine (TYLENOL with CODEINE #2) 300-15 MG Not Taking  Yes No   Sig: TAKE 1 TABLET BY MOUTH DAILY AS NEEDED FOR MODERATE PAIN (PAIN SCORE 4-6).    Patient not taking: Reported on 11/20/2023   albuterol (ProAir HFA) 90 mcg/act inhaler   No Yes   Sig: Inhale 2 puffs every 6 (six) hours as needed for shortness of breath Dispense 1 spacer   amLODIPine (NORVASC) 10 mg tablet Not Taking Self Yes No   Sig: Take 10 mg by mouth daily   Patient not taking: Reported on 2023   amLODIPine-valsartan (Jose Daniel Chimes)  MG per tablet 2023  Yes Yes   atenolol (TENORMIN) 25 mg tablet 2023  Yes Yes   Sig: Take 50 mg by mouth daily   buPROPion (WELLBUTRIN XL) 150 mg 24 hr tablet Not Taking  Yes No   Sig: Take 150 mg by mouth   Patient not taking: Reported on 2023   cloNIDine (CATAPRES) 0.2 mg tablet Not Taking Self Yes No   Sig: Take 0.2 mg by mouth daily at bedtime   Patient not taking: Reported on 2023   clobetasol (TEMOVATE) 0.05 % ointment Not Taking  No No   Sig: Apply topically 2 (two) times a day Not on face or genitals   Patient not taking: Reported on 2023   clotrimazole (LOTRIMIN) 1 % cream Not Taking  No No   Sig: Apply topically 2 (two) times a day   Patient not taking: Reported on 2023   cyclobenzaprine (FLEXERIL) 10 mg tablet Not Taking  No No   Sig: Take 1 tablet (10 mg total) by mouth 3 (three) times a day as needed for muscle spasms   Patient not taking: Reported on 2023   diphenhydrAMINE (BENADRYL) 25 mg tablet Not Taking  No No   Sig: Take 1 tablet (25 mg total) by mouth every 6 (six) hours as needed for itching   Patient not taking: Reported on 2023   ergocalciferol (VITAMIN D2) 50,000 units Past Week  Yes Yes   Sig: Take 50,000 Units by mouth once a week   ezetimibe (ZETIA) 10 mg tablet 2023  Yes Yes   Sig: Take 10 mg by mouth daily   famotidine (PEPCID) 20 mg tablet Not Taking  No No   Sig: Take 1 tablet (20 mg total) by mouth daily at bedtime for 14 days   Patient not taking: Reported on 2023   fluticasone (FLONASE) 50 mcg/act nasal spray   No No   Si spray into each nostril daily for 14 days   gabapentin (NEURONTIN) 300 mg capsule Not Taking  Yes No   Sig: Take 300 mg by mouth daily at bedtime as needed   Patient not taking: Reported on 11/20/2023   gabapentin (NEURONTIN) 600 MG tablet Not Taking  Yes No   Sig: TAKE 1 Tablet BY MOUTH TWICE DAILY FOR PAIN AND SLEEP   Patient not taking: Reported on 11/20/2023   hydrOXYzine HCL (ATARAX) 25 mg tablet Not Taking  Yes No   Sig: Take 25 mg by mouth every 6 (six) hours as needed for itching   Patient not taking: Reported on 7/3/2022   hydroxychloroquine (PLAQUENIL) 200 mg tablet Not Taking  Yes No   Sig: Take 400 mg by mouth daily   Patient not taking: Reported on 11/20/2023   ivermectin (STROMECTOL) 3 MG TABS Not Taking  Yes No   Sig: TAKE 6 TABLETS (18 MG TOTAL) BY MOUTH EVERY 14 (FOURTEEN) DAYS. Patient not taking: Reported on 11/20/2023   ivermectin (STROMECTOL) 3 MG TABS Not Taking  Yes No   Sig: Take 18 mg by mouth   Patient not taking: Reported on 11/20/2023   ketotifen (ZADITOR) 0.025 % ophthalmic solution Not Taking  No No   Sig: Administer 1 drop into the left eye 2 (two) times a day   Patient not taking: Reported on 11/20/2023   lidocaine (LIDODERM) 5 % Not Taking  Yes No   Sig: PLACE 1 PATCH ONTO THE SKIN DAILY.  REMOVE & DISCARD PATCH WITHIN 12 HOURS OR AS DIRECTED BY MD   Patient not taking: Reported on 11/20/2023   lovastatin (MEVACOR) 10 MG tablet Not Taking  Yes No   Sig: Take 10 mg by mouth   Patient not taking: Reported on 11/20/2023   meloxicam (MOBIC) 7.5 mg tablet Not Taking  Yes No   Sig: Take 7.5 mg by mouth   Patient not taking: Reported on 11/20/2023   metFORMIN (GLUCOPHAGE-XR) 500 mg 24 hr tablet Not Taking  Yes No   Sig: Take 1 tablet by mouth   Patient not taking: Reported on 11/20/2023   methocarbamol (ROBAXIN) 500 mg tablet Not Taking  No No   Sig: Take 1 tablet (500 mg total) by mouth 3 (three) times a day   Patient not taking: Reported on 11/20/2023   methocarbamol (ROBAXIN) 500 mg tablet Not Taking  No No   Sig: Take 1 tablet (500 mg total) by mouth 4 (four) times a day   Patient not taking: Reported on 11/20/2023 methylPREDNISolone 4 MG tablet therapy pack Not Taking  Yes No   Patient not taking: Reported on 11/20/2023   montelukast (SINGULAIR) 10 mg tablet Not Taking Self Yes No   Sig: Take 10 mg by mouth daily at bedtime   Patient not taking: Reported on 11/20/2023   omeprazole (PriLOSEC) 10 mg delayed release capsule Not Taking  Yes No   Sig: Take 20 mg by mouth   Patient not taking: Reported on 11/20/2023   pantoprazole (PROTONIX) 20 mg tablet 11/19/2023  Yes Yes   Sig: Take 40 mg by mouth daily   phentermine (ADIPEX-P) 37.5 MG tablet Not Taking  Yes No   Sig: Take 37.5 mg by mouth   Patient not taking: Reported on 11/20/2023   pramipexole (MIRAPEX) 1 mg tablet Not Taking Self Yes No   Sig: Take 0.25 mg by mouth 3 (three) times a day   Patient not taking: Reported on 11/20/2023   pramipexole (MIRAPEX) 1 mg tablet 11/19/2023  Yes Yes   Sig: Take 1 mg by mouth 2 (two) times a day   predniSONE 10 mg tablet Not Taking  No No   Sig: Take 6 pills day one, 5 pills day 2, 4 pills day 3, 3 pills day 4, 2 pills day 5, and 1 pill day 6.    Patient not taking: Reported on 8/22/2020   predniSONE 10 mg tablet Not Taking  No No   Sig: Take once daily all days pills on this schedule 6- 6- 5- 4- 3- 2- 1   Patient not taking: Reported on 11/20/2023   predniSONE 10 mg tablet Not Taking  No No   Sig: Take once daily all days pills on this schedule 6- 6- 5- 4- 3- 2- 1   Patient not taking: Reported on 11/20/2023   predniSONE 10 mg tablet Not Taking  No No   Sig: Take once daily all days pills on this schedule 6- 6- 5- 4- 3- 2- 1   Patient not taking: Reported on 11/20/2023   rosuvastatin (CRESTOR) 10 MG tablet 11/19/2023  Yes Yes   Sig: Take 40 mg by mouth daily   traMADol (ULTRAM) 50 mg tablet Not Taking  No No   Sig: Take 1 tablet (50 mg total) by mouth every 6 (six) hours as needed for moderate pain   Patient not taking: Reported on 11/20/2023   venlafaxine (EFFEXOR-XR) 75 mg 24 hr capsule Not Taking  Yes No   Sig: Take 75 mg by mouth Patient not taking: Reported on 11/20/2023      Facility-Administered Medications: None       Past Medical History:   Diagnosis Date    Angioedema     Anxiety     Arthritis     Diverticulitis     Fibromyalgia     Hypercholesterolemia     Hypertension     Migraine     Obesity     Overactive bladder     Restless leg        Past Surgical History:   Procedure Laterality Date    ADENOIDECTOMY      BARIATRIC SURGERY      CARPAL TUNNEL RELEASE  01/07/2021    BL    HYSTERECTOMY      TONSILLECTOMY         Family History   Problem Relation Age of Onset    Diabetes Mother     Hypertension Mother     Heart disease Father     Hypertension Father      I have reviewed and agree with the history as documented. E-Cigarette/Vaping    E-Cigarette Use Never User      E-Cigarette/Vaping Substances     Social History     Tobacco Use    Smoking status: Never    Smokeless tobacco: Never   Vaping Use    Vaping Use: Never used   Substance Use Topics    Alcohol use: No    Drug use: No       Review of Systems   HENT:  Positive for sore throat, trouble swallowing and voice change. Negative for drooling and ear discharge. Respiratory:  Negative for choking, shortness of breath, wheezing and stridor. Gastrointestinal:  Negative for abdominal pain, diarrhea and nausea. Hematological:  Negative for adenopathy. All other systems reviewed and are negative. Physical Exam  Physical Exam  Vitals (Patient is physically able to lay backwards without any significant difficulty.) and nursing note reviewed. Constitutional:       General: She is not in acute distress. Appearance: She is obese. She is not ill-appearing or toxic-appearing. HENT:      Head: Normocephalic and atraumatic. Jaw: No trismus, tenderness, swelling, pain on movement or malocclusion. Right Ear: External ear normal.      Left Ear: External ear normal.      Nose: Nose normal. No congestion.       Mouth/Throat:      Mouth: Mucous membranes are moist. Pharynx: Uvula midline. Pharyngeal swelling, posterior oropharyngeal erythema and uvula swelling present. No oropharyngeal exudate. Tonsils: Tonsillar exudate present. No tonsillar abscesses. Cardiovascular:      Rate and Rhythm: Normal rate. Pulmonary:      Effort: Pulmonary effort is normal. No respiratory distress. Breath sounds: No stridor. No wheezing or rales. Abdominal:      General: Abdomen is flat. There is no distension. Tenderness: There is no abdominal tenderness. There is no rebound. Musculoskeletal:         General: No signs of injury. Skin:     Coloration: Skin is not pale. Neurological:      General: No focal deficit present. Mental Status: She is alert. Psychiatric:         Mood and Affect: Mood normal.         Thought Content:  Thought content normal.         Judgment: Judgment normal.         Vital Signs  ED Triage Vitals [11/20/23 0943]   Temperature Pulse Respirations Blood Pressure SpO2   (!) 97.2 °F (36.2 °C) 82 (!) 24 (!) 212/104 100 %      Temp Source Heart Rate Source Patient Position - Orthostatic VS BP Location FiO2 (%)   Temporal Monitor Sitting Left arm --      Pain Score       7           Vitals:    11/20/23 1024 11/20/23 1030 11/20/23 1045 11/20/23 1130   BP: (!) 185/104 (!) 185/104 (!) 194/107 (!) 191/91   Pulse:  67 72 73   Patient Position - Orthostatic VS:  Sitting Lying Lying         Visual Acuity      ED Medications  Medications   dexamethasone (PF) (DECADRON) injection 8 mg (8 mg Intravenous Given 11/20/23 1025)   cloNIDine (CATAPRES) tablet 0.2 mg (0.2 mg Oral Given 11/20/23 1024)       Diagnostic Studies  Results Reviewed       Procedure Component Value Units Date/Time    FLU/RSV/COVID - if FLU/RSV clinically relevant [[id number]]  (Normal) Collected: 11/20/23 1023    Lab Status: Final result Specimen: Nares from Nose Updated: 11/20/23 1122     SARS-CoV-2 Negative     INFLUENZA A PCR Negative     INFLUENZA B PCR Negative     RSV PCR Negative Narrative:      FOR PEDIATRIC PATIENTS - copy/paste COVID Guidelines URL to browser: [email or web address]    SARS-CoV-2 assay is a Nucleic Acid Amplification assay intended for the  qualitative detection of nucleic acid from SARS-CoV-2 in nasopharyngeal  swabs. Results are for the presumptive identification of SARS-CoV-2 RNA. Positive results are indicative of infection with SARS-CoV-2, the virus  causing COVID-19, but do not rule out bacterial infection or co-infection  with other viruses. Laboratories within the [facility] and its  territories are required to report all positive results to the appropriate  public health authorities. Negative results do not preclude SARS-CoV-2  infection and should not be used as the sole basis for treatment or other  patient management decisions. Negative results must be combined with  clinical observations, patient history, and epidemiological information. This test has not been FDA cleared or approved. This test has been authorized by FDA under an Emergency Use Authorization  (EUA). This test is only authorized for the duration of time the  declaration that circumstances exist justifying the authorization of the  emergency use of an in vitro diagnostic tests for detection of SARS-CoV-2  virus and/or diagnosis of COVID-19 infection under section 564(b)(1) of  the Act, 21 U. S.C. 595UIJ-3(S)(4), unless the authorization is terminated  or revoked sooner. The test has been validated but independent review by FDA  and CLIA is pending. Test performed using [employer] GeneXpert: This RT-PCR assay targets N2,  a region unique to SARS-CoV-2. A conserved region in the E-gene was chosen  for pan-Sarbecovirus detection which includes SARS-CoV-2. According to CMS-2020-01-R, this platform meets the definition of high-throughput technology.     Strep A PCR [[id number]]  (Normal) Collected: 11/20/23 1023    Lab Status: Final result Specimen: Throat Updated: 11/20/23 1107     STREP A PCR Not Detected                   No orders to display              Procedures  Procedures         ED Course  ED Course as of 11/20/23 1401   Mon Nov 20, 2023   1136 Patient doing better. Still able to swallow her own saliva. She will take her blood pressure medications when she returns home. Diagnosis is likely that of viral pharyngitis with reactionary angioedema of the uvula. Medical Decision Making  Patient presented to the emergency department and a MSE was performed. The patient was evaluated for complaint related to acute sore throat. Patient is potentially at risk for, but not limited to, allergic reaction, viral pharyngitis, bacterial pharyngitis, retropharyngeal abscess, foreign body, peritonsillar abscess. Several of these diagnoses have been evaluated and ruled out by history and physical.  As needed, patient will be further evaluated with laboratory and imaging studies. Higher level diagnostics, such as CT imaging or ultrasound, may also be required. Please see work-up portion of the note for further evaluation of patient's risk. Socioeconomic factors were also considered as part of the decision-making process. Unless otherwise stated in the chart or patient is admitted as elsewhere documented, any previously prescribed medications will be maintained. Was likely diagnosis is that of viral pharyngitis with reactionary mild angioedema of the uvula. Patient was treated with steroids. Patient improved. Patient had no airway difficulties. She had no trismus. He was able to manage her own secretions. She had no stridor. She was able to lie flat without any difficulties. Patient was stable for discharge and feeling much better at the time of disposition.     Problems Addressed:  Angioedema, initial encounter: undiagnosed new problem with uncertain prognosis  Viral pharyngitis: self-limited or minor problem    Amount and/or Complexity of Data Reviewed  Labs: ordered. Risk  Prescription drug management. Disposition  Final diagnoses:   Viral pharyngitis   Angioedema, initial encounter     Time reflects when diagnosis was documented in both MDM as applicable and the Disposition within this note       Time User Action Codes Description Comment    11/20/2023 11:39 AM Celso Vallejo Add [J02.9] Viral pharyngitis     11/20/2023 11:39 AM Ubaldo Webster Add [T78. 3XXA] Angioedema, initial encounter           ED Disposition       ED Disposition   Discharge    Condition   Stable    Date/Time   Mon Nov 20, 2023 11:39 AM    Comment   Camilla Encinas discharge to home/self care. Follow-up Information       Follow up With Specialties Details Why Contact Info    Lily Bledsoe MD Family Medicine In 1 day  [address]  [phone]              Discharge Medication List as of 11/20/2023 11:40 AM        START taking these medications    Details   ! ! predniSONE 20 mg tablet Take 2 tablets (40 mg total) by mouth daily for 5 days Do not start before November 21, 2023., Starting Tue 11/21/2023, Until Sun 11/26/2023, Normal       !! - Potential duplicate medications found. Please discuss with provider.         CONTINUE these medications which have NOT CHANGED    Details   albuterol (ProAir HFA) 90 mcg/act inhaler Inhale 2 puffs every 6 (six) hours as needed for shortness of breath Dispense 1 spacer, Starting Fri 2/19/2021, Normal      amLODIPine-valsartan (EXFORGE)  MG per tablet Starting Thu 10/6/2022, Historical Med      atenolol (TENORMIN) 25 mg tablet Take 50 mg by mouth daily, Starting Tue 5/7/2019, Historical Med      EPINEPHrine (EPIPEN) 0.3 mg/0.3 mL SOAJ Historical Med      ergocalciferol (VITAMIN D2) 50,000 units Take 50,000 Units by mouth once a week, Starting Mon 5/2/2022, Historical Med      ezetimibe (ZETIA) 10 mg tablet Take 10 mg by mouth daily, Starting u 10/6/2022, Until Mon 11/20/2023, Historical Med      pantoprazole (PROTONIX) 20 mg tablet Take 40 mg by mouth daily, Historical Med      !! pramipexole (MIRAPEX) 1 mg tablet Take 1 mg by mouth 2 (two) times a day, Starting Mon 8/12/2019, Historical Med      rosuvastatin (CRESTOR) 10 MG tablet Take 40 mg by mouth daily, Starting Mon 5/2/2022, Historical Med      SUMAtriptan (IMITREX) 100 mg tablet Take 100 mg by mouth once as needed for migraine, Historical Med      acetaminophen-codeine (TYLENOL with CODEINE #2) 300-15 MG TAKE 1 TABLET BY MOUTH DAILY AS NEEDED FOR MODERATE PAIN (PAIN SCORE 4-6). , Historical Med      amLODIPine (NORVASC) 10 mg tablet Take 10 mg by mouth daily, Historical Med      buPROPion (WELLBUTRIN XL) 150 mg 24 hr tablet Take 150 mg by mouth, Starting Fri 1/25/2019, Until Mon 11/20/2023 at 2359, Historical Med      clobetasol (TEMOVATE) 0.05 % ointment Apply topically 2 (two) times a day Not on face or genitals, Starting Thu 7/7/2022, Normal      cloNIDine (CATAPRES) 0.2 mg tablet Take 0.2 mg by mouth daily at bedtime, Historical Med      clotrimazole (LOTRIMIN) 1 % cream Apply topically 2 (two) times a day, Starting Sun 7/3/2022, Normal      cyclobenzaprine (FLEXERIL) 10 mg tablet Take 1 tablet (10 mg total) by mouth 3 (three) times a day as needed for muscle spasms, Starting Fri 6/11/2021, Normal      diphenhydrAMINE (BENADRYL) 25 mg tablet Take 1 tablet (25 mg total) by mouth every 6 (six) hours as needed for itching, Starting Thu 7/7/2022, Normal      famotidine (PEPCID) 20 mg tablet Take 1 tablet (20 mg total) by mouth daily at bedtime for 14 days, Starting Thu 7/7/2022, Until Wed 10/12/2022, Normal      Fesoterodine Fumarate ER 4 MG TB24 Take 1 tablet (4 mg total) by mouth daily at bedtime, Starting Thu 8/16/2018, Phone In      fluticasone (FLONASE) 50 mcg/act nasal spray 1 spray into each nostril daily for 14 days, Starting Sat 8/7/2021, Until Wed 10/12/2022, Normal      gabapentin (NEURONTIN) 300 mg capsule Take 300 mg by mouth daily at bedtime as needed, Starting Mon 6/20/2022, Historical Med      gabapentin (NEURONTIN) 600 MG tablet TAKE 1 Tablet BY MOUTH TWICE DAILY FOR PAIN AND SLEEP, Historical Med      hydroxychloroquine (PLAQUENIL) 200 mg tablet Take 400 mg by mouth daily, Starting Sat 10/12/2019, Historical Med      hydrOXYzine HCL (ATARAX) 25 mg tablet Take 25 mg by mouth every 6 (six) hours as needed for itching, Historical Med      !! ivermectin (STROMECTOL) 3 MG TABS TAKE 6 TABLETS (18 MG TOTAL) BY MOUTH EVERY 14 (FOURTEEN) DAYS., Historical Med      !! ivermectin (STROMECTOL) 3 MG TABS Take 18 mg by mouth, Starting Thu 6/30/2022, Historical Med      ketotifen (ZADITOR) 0.025 % ophthalmic solution Administer 1 drop into the left eye 2 (two) times a day, Starting Thu 11/14/2019, Normal      lidocaine (LIDODERM) 5 % PLACE 1 PATCH ONTO THE SKIN DAILY.  REMOVE & DISCARD PATCH WITHIN 12 HOURS OR AS DIRECTED BY MD, Historical Med      lovastatin (MEVACOR) 10 MG tablet Take 10 mg by mouth, Starting Mon 8/12/2019, Until Sat 8/7/2021 at 2359, Historical Med      meloxicam (MOBIC) 7.5 mg tablet Take 7.5 mg by mouth, Starting Wed 7/17/2019, Historical Med      metFORMIN (GLUCOPHAGE-XR) 500 mg 24 hr tablet Take 1 tablet by mouth, Starting Thu 5/9/2019, Historical Med      !! methocarbamol (ROBAXIN) 500 mg tablet Take 1 tablet (500 mg total) by mouth 3 (three) times a day, Starting Sun 9/20/2020, Normal      !! methocarbamol (ROBAXIN) 500 mg tablet Take 1 tablet (500 mg total) by mouth 4 (four) times a day, Starting Sat 8/7/2021, Normal      methylPREDNISolone 4 MG tablet therapy pack Historical Med      montelukast (SINGULAIR) 10 mg tablet Take 10 mg by mouth daily at bedtime, Historical Med      Naltrexone-buPROPion HCl ER (Contrave) 8-90 MG TB12 Historical Med      omeprazole (PriLOSEC) 10 mg delayed release capsule Take 20 mg by mouth, Historical Med      phentermine (ADIPEX-P) 37.5 MG tablet Take 37.5 mg by mouth, Starting Thu 10/6/2022, Until Mon 11/20/2023 at 2359, Historical Med      !! pramipexole (MIRAPEX) 1 mg tablet Take 0.25 mg by mouth 3 (three) times a day, Historical Med      !! predniSONE 10 mg tablet Take 6 pills day one, 5 pills day 2, 4 pills day 3, 3 pills day 4, 2 pills day 5, and 1 pill day 6., Normal      !! predniSONE 10 mg tablet Take once daily all days pills on this schedule 6- 6- 5- 4- 3- 2- 1, Normal      !! predniSONE 10 mg tablet Take once daily all days pills on this schedule 6- 6- 5- 4- 3- 2- 1, Normal      !! predniSONE 10 mg tablet Take once daily all days pills on this schedule 6- 6- 5- 4- 3- 2- 1, Normal      traMADol (ULTRAM) 50 mg tablet Take 1 tablet (50 mg total) by mouth every 6 (six) hours as needed for moderate pain, Starting Fri 6/11/2021, Normal      venlafaxine (EFFEXOR-XR) 75 mg 24 hr capsule Take 75 mg by mouth, Starting Thu 6/6/2019, Historical Med       !! - Potential duplicate medications found. Please discuss with provider. No discharge procedures on file.     PDMP Review         Value Time User    PDMP Reviewed  Yes 6/11/2021  2:30 PM Yudy Long MD            ED Provider  Electronically Signed by             Edwar Haney DO  11/20/23 7718

## 2023-11-20 NOTE — PATIENT INSTRUCTIONS
Recommend patient be seen and evaluated further at [facility] emergency department for sensation of throat swelling and previous anaphylactic episode. As well as for evaluation of head pressure and right shoulder pain. .  Patient is being transported via EMS.

## 2023-11-20 NOTE — Clinical Note
Edson Rivera was seen and treated in our emergency department on 11/20/2023. Diagnosis:     Hipolito Mesa  may return to work on return date. She may return on this date: 11/22/2023         If you have any questions or concerns, please don't hesitate to call.       Sophy Sánchez, DO    ______________________________           _______________          _______________  Hospital Representative                              Date                                Time

## 2024-07-30 ENCOUNTER — HOSPITAL ENCOUNTER (EMERGENCY)
Facility: HOSPITAL | Age: 54
Discharge: HOME | End: 2024-07-30
Attending: EMERGENCY MEDICINE | Admitting: EMERGENCY MEDICINE
Payer: COMMERCIAL

## 2024-07-30 ENCOUNTER — APPOINTMENT (EMERGENCY)
Dept: RADIOLOGY | Facility: HOSPITAL | Age: 54
End: 2024-07-30
Payer: COMMERCIAL

## 2024-07-30 VITALS
BODY MASS INDEX: 39.96 KG/M2 | WEIGHT: 217.15 LBS | HEIGHT: 62 IN | TEMPERATURE: 97.3 F | DIASTOLIC BLOOD PRESSURE: 71 MMHG | SYSTOLIC BLOOD PRESSURE: 153 MMHG | HEART RATE: 81 BPM | OXYGEN SATURATION: 98 % | RESPIRATION RATE: 20 BRPM

## 2024-07-30 DIAGNOSIS — M10.9 GOUT: Primary | ICD-10-CM

## 2024-07-30 PROCEDURE — 99284 EMERGENCY DEPT VISIT MOD MDM: CPT | Performed by: EMERGENCY MEDICINE

## 2024-07-30 PROCEDURE — 99283 EMERGENCY DEPT VISIT LOW MDM: CPT

## 2024-07-30 PROCEDURE — 73630 X-RAY EXAM OF FOOT: CPT

## 2024-07-30 RX ORDER — DOXYCYCLINE HYCLATE 100 MG/1
100 CAPSULE ORAL 2 TIMES DAILY
Qty: 20 CAPSULE | Refills: 0 | Status: SHIPPED | OUTPATIENT
Start: 2024-07-30 | End: 2024-08-09

## 2024-07-30 RX ORDER — PREDNISONE 20 MG/1
40 TABLET ORAL DAILY
Qty: 10 TABLET | Refills: 0 | Status: SHIPPED | OUTPATIENT
Start: 2024-07-30 | End: 2024-08-04

## 2024-07-30 RX ORDER — OXYCODONE HYDROCHLORIDE AND ACETAMINOPHEN 5; 325 MG/1; MG/1
1 TABLET ORAL EVERY 4 HOURS PRN
Qty: 20 TABLET | Refills: 0 | Status: SHIPPED | OUTPATIENT
Start: 2024-07-30

## 2024-07-30 RX ORDER — PREDNISONE 20 MG/1
40 TABLET ORAL ONCE
Status: COMPLETED | OUTPATIENT
Start: 2024-07-30 | End: 2024-07-30

## 2024-07-30 RX ADMIN — PREDNISONE 40 MG: 20 TABLET ORAL at 08:14

## 2024-07-30 NOTE — ED PROVIDER NOTES
History  Chief Complaint   Patient presents with    Toe Pain     Patient c/o right toe pain. Patient denies any known injury     Patient complains of atraumatic pain to the right great toe.  Started yesterday.  No fevers or chills.  No nausea vomiting or diarrhea.  No history of gout.      History provided by:  Patient   used: No    Toe Pain  Location:  Right great toe  Quality:  Achy  Severity:  Moderate  Onset quality:  Gradual  Duration:  1 day  Timing:  Constant  Progression:  Worsening  Chronicity:  New  Context:  Atraumatic right great toe pain  Relieved by:  Nothing  Worsened by:  Movement  Ineffective treatments:  None tried  Associated symptoms: no abdominal pain, no chest pain, no congestion, no cough, no diarrhea, no ear pain, no fatigue, no fever, no headaches, no loss of consciousness, no nausea, no rash, no rhinorrhea, no shortness of breath, no sore throat, no vomiting and no wheezing        Prior to Admission Medications   Prescriptions Last Dose Informant Patient Reported? Taking?   EPINEPHrine (EPIPEN) 0.3 mg/0.3 mL SOAJ   Yes No   Fesoterodine Fumarate ER 4 MG TB24   No No   Sig: Take 1 tablet (4 mg total) by mouth daily at bedtime   Patient not taking: Reported on 7/3/2022   Naltrexone-buPROPion HCl ER (Contrave) 8-90 MG TB12   Yes No   Patient not taking: Reported on 11/20/2023   SUMAtriptan (IMITREX) 100 mg tablet  Self Yes No   Sig: Take 100 mg by mouth once as needed for migraine   acetaminophen-codeine (TYLENOL with CODEINE #2) 300-15 MG   Yes No   Sig: TAKE 1 TABLET BY MOUTH DAILY AS NEEDED FOR MODERATE PAIN (PAIN SCORE 4-6).   Patient not taking: Reported on 11/20/2023   albuterol (ProAir HFA) 90 mcg/act inhaler   No No   Sig: Inhale 2 puffs every 6 (six) hours as needed for shortness of breath Dispense 1 spacer   amLODIPine (NORVASC) 10 mg tablet  Self Yes No   Sig: Take 10 mg by mouth daily   Patient not taking: Reported on 11/20/2023   amLODIPine-valsartan (EXFORGE)   MG per tablet   Yes No   atenolol (TENORMIN) 25 mg tablet   Yes No   Sig: Take 50 mg by mouth daily   buPROPion (WELLBUTRIN XL) 150 mg 24 hr tablet   Yes No   Sig: Take 150 mg by mouth   Patient not taking: Reported on 2023   cloNIDine (CATAPRES) 0.2 mg tablet  Self Yes No   Sig: Take 0.2 mg by mouth daily at bedtime   Patient not taking: Reported on 2023   clobetasol (TEMOVATE) 0.05 % ointment   No No   Sig: Apply topically 2 (two) times a day Not on face or genitals   Patient not taking: Reported on 2023   clotrimazole (LOTRIMIN) 1 % cream   No No   Sig: Apply topically 2 (two) times a day   Patient not taking: Reported on 2023   cyclobenzaprine (FLEXERIL) 10 mg tablet   No No   Sig: Take 1 tablet (10 mg total) by mouth 3 (three) times a day as needed for muscle spasms   Patient not taking: Reported on 2023   diphenhydrAMINE (BENADRYL) 25 mg tablet   No No   Sig: Take 1 tablet (25 mg total) by mouth every 6 (six) hours as needed for itching   Patient not taking: Reported on 2023   ergocalciferol (VITAMIN D2) 50,000 units   Yes No   Sig: Take 50,000 Units by mouth once a week   ezetimibe (ZETIA) 10 mg tablet   Yes No   Sig: Take 10 mg by mouth daily   famotidine (PEPCID) 20 mg tablet   No No   Sig: Take 1 tablet (20 mg total) by mouth daily at bedtime for 14 days   Patient not taking: Reported on 2023   fluticasone (FLONASE) 50 mcg/act nasal spray   No No   Si spray into each nostril daily for 14 days   gabapentin (NEURONTIN) 300 mg capsule   Yes No   Sig: Take 300 mg by mouth daily at bedtime as needed   Patient not taking: Reported on 2023   gabapentin (NEURONTIN) 600 MG tablet   Yes No   Sig: TAKE 1 Tablet BY MOUTH TWICE DAILY FOR PAIN AND SLEEP   Patient not taking: Reported on 2023   hydrOXYzine HCL (ATARAX) 25 mg tablet   Yes No   Sig: Take 25 mg by mouth every 6 (six) hours as needed for itching   Patient not taking: Reported on 7/3/2022    hydroxychloroquine (PLAQUENIL) 200 mg tablet   Yes No   Sig: Take 400 mg by mouth daily   Patient not taking: Reported on 11/20/2023   ivermectin (STROMECTOL) 3 MG TABS   Yes No   Sig: TAKE 6 TABLETS (18 MG TOTAL) BY MOUTH EVERY 14 (FOURTEEN) DAYS.   Patient not taking: Reported on 11/20/2023   ivermectin (STROMECTOL) 3 MG TABS   Yes No   Sig: Take 18 mg by mouth   Patient not taking: Reported on 11/20/2023   ketotifen (ZADITOR) 0.025 % ophthalmic solution   No No   Sig: Administer 1 drop into the left eye 2 (two) times a day   Patient not taking: Reported on 11/20/2023   lidocaine (LIDODERM) 5 %   Yes No   Sig: PLACE 1 PATCH ONTO THE SKIN DAILY. REMOVE & DISCARD PATCH WITHIN 12 HOURS OR AS DIRECTED BY MD   Patient not taking: Reported on 11/20/2023   lovastatin (MEVACOR) 10 MG tablet   Yes No   Sig: Take 10 mg by mouth   Patient not taking: Reported on 11/20/2023   meloxicam (MOBIC) 7.5 mg tablet   Yes No   Sig: Take 7.5 mg by mouth   Patient not taking: Reported on 11/20/2023   metFORMIN (GLUCOPHAGE-XR) 500 mg 24 hr tablet   Yes No   Sig: Take 1 tablet by mouth   Patient not taking: Reported on 11/20/2023   methocarbamol (ROBAXIN) 500 mg tablet   No No   Sig: Take 1 tablet (500 mg total) by mouth 3 (three) times a day   Patient not taking: Reported on 11/20/2023   methocarbamol (ROBAXIN) 500 mg tablet   No No   Sig: Take 1 tablet (500 mg total) by mouth 4 (four) times a day   Patient not taking: Reported on 11/20/2023   methylPREDNISolone 4 MG tablet therapy pack   Yes No   Patient not taking: Reported on 11/20/2023   montelukast (SINGULAIR) 10 mg tablet  Self Yes No   Sig: Take 10 mg by mouth daily at bedtime   Patient not taking: Reported on 11/20/2023   omeprazole (PriLOSEC) 10 mg delayed release capsule   Yes No   Sig: Take 20 mg by mouth   Patient not taking: Reported on 11/20/2023   pantoprazole (PROTONIX) 20 mg tablet   Yes No   Sig: Take 40 mg by mouth daily   phentermine (ADIPEX-P) 37.5 MG tablet   Yes  No   Sig: Take 37.5 mg by mouth   Patient not taking: Reported on 11/20/2023   pramipexole (MIRAPEX) 1 mg tablet  Self Yes No   Sig: Take 0.25 mg by mouth 3 (three) times a day   Patient not taking: Reported on 11/20/2023   pramipexole (MIRAPEX) 1 mg tablet   Yes No   Sig: Take 1 mg by mouth 2 (two) times a day   predniSONE 10 mg tablet   No No   Sig: Take 6 pills day one, 5 pills day 2, 4 pills day 3, 3 pills day 4, 2 pills day 5, and 1 pill day 6.   Patient not taking: Reported on 8/22/2020   predniSONE 10 mg tablet   No No   Sig: Take once daily all days pills on this schedule 6- 6- 5- 4- 3- 2- 1   Patient not taking: Reported on 11/20/2023   predniSONE 10 mg tablet   No No   Sig: Take once daily all days pills on this schedule 6- 6- 5- 4- 3- 2- 1   Patient not taking: Reported on 11/20/2023   predniSONE 10 mg tablet   No No   Sig: Take once daily all days pills on this schedule 6- 6- 5- 4- 3- 2- 1   Patient not taking: Reported on 11/20/2023   rosuvastatin (CRESTOR) 10 MG tablet   Yes No   Sig: Take 40 mg by mouth daily   traMADol (ULTRAM) 50 mg tablet   No No   Sig: Take 1 tablet (50 mg total) by mouth every 6 (six) hours as needed for moderate pain   Patient not taking: Reported on 11/20/2023   venlafaxine (EFFEXOR-XR) 75 mg 24 hr capsule   Yes No   Sig: Take 75 mg by mouth   Patient not taking: Reported on 11/20/2023      Facility-Administered Medications: None       Past Medical History:   Diagnosis Date    Angioedema     Anxiety     Arthritis     Diverticulitis     Fibromyalgia     Hypercholesterolemia     Hypertension     Migraine     Obesity     Overactive bladder     Restless leg        Past Surgical History:   Procedure Laterality Date    ADENOIDECTOMY      BARIATRIC SURGERY      CARPAL TUNNEL RELEASE  01/07/2021    BL    HYSTERECTOMY      TONSILLECTOMY         Family History   Problem Relation Age of Onset    Diabetes Mother     Hypertension Mother     Heart disease Father     Hypertension Father       I have reviewed and agree with the history as documented.    E-Cigarette/Vaping    E-Cigarette Use Never User      E-Cigarette/Vaping Substances     Social History     Tobacco Use    Smoking status: Never    Smokeless tobacco: Never   Vaping Use    Vaping status: Never Used   Substance Use Topics    Alcohol use: No    Drug use: No       Review of Systems   Constitutional:  Negative for chills, fatigue and fever.   HENT:  Negative for congestion, ear pain, hearing loss, rhinorrhea, sore throat, trouble swallowing and voice change.    Eyes:  Negative for pain and discharge.   Respiratory:  Negative for cough, shortness of breath and wheezing.    Cardiovascular:  Negative for chest pain and palpitations.   Gastrointestinal:  Negative for abdominal pain, blood in stool, constipation, diarrhea, nausea and vomiting.   Genitourinary:  Negative for dysuria, flank pain, frequency and hematuria.   Musculoskeletal:  Positive for arthralgias and joint swelling. Negative for neck pain and neck stiffness.   Skin:  Negative for rash and wound.   Neurological:  Negative for dizziness, seizures, loss of consciousness, syncope, facial asymmetry and headaches.   Psychiatric/Behavioral:  Negative for hallucinations, self-injury and suicidal ideas.    All other systems reviewed and are negative.      Physical Exam  Physical Exam  Constitutional:       General: She is not in acute distress.     Appearance: Normal appearance. She is not ill-appearing.   HENT:      Head: Normocephalic and atraumatic.      Right Ear: External ear normal.      Left Ear: External ear normal.      Nose: Nose normal.      Mouth/Throat:      Mouth: Mucous membranes are moist.   Eyes:      Extraocular Movements: Extraocular movements intact.      Pupils: Pupils are equal, round, and reactive to light.   Cardiovascular:      Rate and Rhythm: Normal rate and regular rhythm.   Pulmonary:      Effort: Pulmonary effort is normal. No respiratory distress.       Breath sounds: Normal breath sounds.   Abdominal:      General: Abdomen is flat. Bowel sounds are normal. There is no distension.      Palpations: Abdomen is soft.      Tenderness: There is no abdominal tenderness.   Musculoskeletal:         General: Swelling and tenderness present.      Cervical back: Normal range of motion and neck supple.      Comments: Tender and swelling at the first MTP.  Slight erythema and warmth.  No fluctuance or drainage.  No discharge.  No streaking up the foot.  Dorsalis pedis pulse 2+.   Skin:     General: Skin is warm and dry.      Capillary Refill: Capillary refill takes less than 2 seconds.   Neurological:      General: No focal deficit present.      Mental Status: She is alert and oriented to person, place, and time.   Psychiatric:         Mood and Affect: Mood normal.         Behavior: Behavior normal.         Vital Signs  ED Triage Vitals [07/30/24 0739]   Temperature Pulse Respirations Blood Pressure SpO2   (!) 97.3 °F (36.3 °C) 81 20 153/71 98 %      Temp Source Heart Rate Source Patient Position - Orthostatic VS BP Location FiO2 (%)   Temporal Monitor Lying Left arm --      Pain Score       --           Vitals:    07/30/24 0739 07/30/24 0745   BP: 153/71 153/71   Pulse: 81    Patient Position - Orthostatic VS: Lying Sitting         Visual Acuity      ED Medications  Medications   predniSONE tablet 40 mg (has no administration in time range)       Diagnostic Studies  Results Reviewed       None                   XR foot 3+ views RIGHT   ED Interpretation by Gage Grimaldo MD (07/30 0804)   No fracture or dislocation noted                 Procedures  Splint application    Date/Time: 7/30/2024 7:49 AM    Performed by: Gage Grimaldo MD  Authorized by: Gage Grimaldo MD  [facility] Protocol:  Consent: Verbal consent obtained.  Consent given by: patient  Patient identity confirmed: verbally with patient    Pre-procedure details:     Sensation:  Normal  Procedure details:      Laterality:  Right    Location:  Foot    Foot:  R footCast type:     Cast type: Surgical shoe.  Post-procedure details:     Pain:  Unchanged    Sensation:  Normal    Patient tolerance of procedure:  Tolerated well, no immediate complications           ED Course                                               Medical Decision Making  Amount and/or Complexity of Data Reviewed  Radiology: ordered.                 Disposition  Final diagnoses:   Gout     Time reflects when diagnosis was documented in both MDM as applicable and the Disposition within this note       Time User Action Codes Description Comment    7/30/2024  7:41 AM Gage Grimaldo Add [M10.9] Gout           ED Disposition       ED Disposition   Discharge    Condition   Stable    Date/Time   Tue Jul 30, 2024  7:41 AM    Comment   Ileana Uzair discharge to home/self care.                   Follow-up Information    None         Patient's Medications   Discharge Prescriptions    DOXYCYCLINE HYCLATE (VIBRAMYCIN) 100 MG CAPSULE    Take 1 capsule (100 mg total) by mouth 2 (two) times a day for 10 days       Start Date: 7/30/2024 End Date: 8/9/2024       Order Dose: 100 mg       Quantity: 20 capsule    Refills: 0    OXYCODONE-ACETAMINOPHEN (PERCOCET) 5-325 MG PER TABLET    Take 1 tablet by mouth every 4 (four) hours as needed for moderate pain for up to 20 doses Max Daily Amount: 6 tablets       Start Date: 7/30/2024 End Date: --       Order Dose: 1 tablet       Quantity: 20 tablet    Refills: 0    PREDNISONE 20 MG TABLET    Take 2 tablets (40 mg total) by mouth daily for 5 days       Start Date: 7/30/2024 End Date: 8/4/2024       Order Dose: 40 mg       Quantity: 10 tablet    Refills: 0       No discharge procedures on file.    PDMP Review         Value Time User    PDMP Reviewed  Yes 6/11/2021  2:30 PM Carmelo Berry MD            ED Provider  Electronically Signed by             Gage Grimaldo MD  07/30/24 0804

## 2024-07-30 NOTE — DISCHARGE INSTRUCTIONS
The steroid you have been prescribed, prednisone, may increase your blood sugars over the next few days.    If you take Percocet please do not take Tylenol/acetaminophen in addition.  The Percocet already has Tylenol/acetaminophen in it.

## 2024-07-30 NOTE — Clinical Note
Stuart Dunne accompanied Ileana Dunne to the emergency department on 7/30/2024.    Return date if applicable: 07/31/2024        If you have any questions or concerns, please don't hesitate to call.      Gage Grimaldo MD